# Patient Record
Sex: FEMALE | HISPANIC OR LATINO | Employment: FULL TIME | URBAN - METROPOLITAN AREA
[De-identification: names, ages, dates, MRNs, and addresses within clinical notes are randomized per-mention and may not be internally consistent; named-entity substitution may affect disease eponyms.]

---

## 2017-01-08 ENCOUNTER — ANESTHESIA (OUTPATIENT)
Dept: SURGERY | Facility: CLINIC | Age: 57
End: 2017-01-08
Payer: COMMERCIAL

## 2017-01-08 ENCOUNTER — APPOINTMENT (OUTPATIENT)
Dept: GENERAL RADIOLOGY | Facility: CLINIC | Age: 57
End: 2017-01-08
Attending: EMERGENCY MEDICINE
Payer: COMMERCIAL

## 2017-01-08 ENCOUNTER — HOSPITAL ENCOUNTER (OUTPATIENT)
Facility: CLINIC | Age: 57
Setting detail: OBSERVATION
Discharge: HOME | End: 2017-01-09
Attending: EMERGENCY MEDICINE | Admitting: SURGERY
Payer: COMMERCIAL

## 2017-01-08 ENCOUNTER — APPOINTMENT (OUTPATIENT)
Dept: GENERAL RADIOLOGY | Facility: CLINIC | Age: 57
End: 2017-01-08
Attending: SURGERY
Payer: COMMERCIAL

## 2017-01-08 ENCOUNTER — APPOINTMENT (OUTPATIENT)
Dept: ULTRASOUND IMAGING | Facility: CLINIC | Age: 57
End: 2017-01-08
Attending: EMERGENCY MEDICINE
Payer: COMMERCIAL

## 2017-01-08 ENCOUNTER — ANESTHESIA EVENT (OUTPATIENT)
Dept: SURGERY | Facility: CLINIC | Age: 57
End: 2017-01-08
Payer: COMMERCIAL

## 2017-01-08 DIAGNOSIS — K80.20: Primary | ICD-10-CM

## 2017-01-08 DIAGNOSIS — G89.18 ACUTE POST-OPERATIVE PAIN: ICD-10-CM

## 2017-01-08 DIAGNOSIS — K83.09: Primary | ICD-10-CM

## 2017-01-08 DIAGNOSIS — K81.0 ACUTE CHOLECYSTITIS: ICD-10-CM

## 2017-01-08 PROBLEM — K80.00 CHOLELITHIASIS AND ACUTE CHOLECYSTITIS WITHOUT OBSTRUCTION: Status: ACTIVE | Noted: 2017-01-08

## 2017-01-08 LAB
ALBUMIN SERPL-MCNC: 4.2 G/DL (ref 3.4–5)
ALP SERPL-CCNC: 84 U/L (ref 40–150)
ALT SERPL W P-5'-P-CCNC: 40 U/L (ref 0–50)
ANION GAP SERPL CALCULATED.3IONS-SCNC: 8 MMOL/L (ref 3–14)
AST SERPL W P-5'-P-CCNC: 19 U/L (ref 0–45)
BASOPHILS # BLD AUTO: 0.1 10E9/L (ref 0–0.2)
BASOPHILS NFR BLD AUTO: 0.5 %
BILIRUB SERPL-MCNC: 0.2 MG/DL (ref 0.2–1.3)
BUN SERPL-MCNC: 14 MG/DL (ref 7–30)
CALCIUM SERPL-MCNC: 8.5 MG/DL (ref 8.5–10.1)
CHLORIDE SERPL-SCNC: 105 MMOL/L (ref 94–109)
CO2 SERPL-SCNC: 25 MMOL/L (ref 20–32)
CREAT SERPL-MCNC: 0.59 MG/DL (ref 0.52–1.04)
D DIMER PPP FEU-MCNC: NORMAL UG/ML FEU (ref 0–0.5)
DIFFERENTIAL METHOD BLD: NORMAL
EOSINOPHIL # BLD AUTO: 0.3 10E9/L (ref 0–0.7)
EOSINOPHIL NFR BLD AUTO: 3.5 %
ERYTHROCYTE [DISTWIDTH] IN BLOOD BY AUTOMATED COUNT: 12.4 % (ref 10–15)
GFR SERPL CREATININE-BSD FRML MDRD: ABNORMAL ML/MIN/1.7M2
GLUCOSE SERPL-MCNC: 117 MG/DL (ref 70–99)
HCT VFR BLD AUTO: 41 % (ref 35–47)
HGB BLD-MCNC: 14.2 G/DL (ref 11.7–15.7)
IMM GRANULOCYTES # BLD: 0 10E9/L (ref 0–0.4)
IMM GRANULOCYTES NFR BLD: 0.3 %
LIPASE SERPL-CCNC: 179 U/L (ref 73–393)
LYMPHOCYTES # BLD AUTO: 3.8 10E9/L (ref 0.8–5.3)
LYMPHOCYTES NFR BLD AUTO: 40.2 %
MCH RBC QN AUTO: 30.7 PG (ref 26.5–33)
MCHC RBC AUTO-ENTMCNC: 34.6 G/DL (ref 31.5–36.5)
MCV RBC AUTO: 89 FL (ref 78–100)
MONOCYTES # BLD AUTO: 0.9 10E9/L (ref 0–1.3)
MONOCYTES NFR BLD AUTO: 9 %
NEUTROPHILS # BLD AUTO: 4.4 10E9/L (ref 1.6–8.3)
NEUTROPHILS NFR BLD AUTO: 46.5 %
NRBC # BLD AUTO: 0 10*3/UL
NRBC BLD AUTO-RTO: 0 /100
PLATELET # BLD AUTO: 300 10E9/L (ref 150–450)
POTASSIUM SERPL-SCNC: 3.8 MMOL/L (ref 3.4–5.3)
PROT SERPL-MCNC: 7.7 G/DL (ref 6.8–8.8)
RBC # BLD AUTO: 4.63 10E12/L (ref 3.8–5.2)
SODIUM SERPL-SCNC: 138 MMOL/L (ref 133–144)
TROPONIN I SERPL-MCNC: NORMAL UG/L (ref 0–0.04)
WBC # BLD AUTO: 9.5 10E9/L (ref 4–11)

## 2017-01-08 PROCEDURE — 40000169 ZZH STATISTIC PRE-PROCEDURE ASSESSMENT I: Performed by: SURGERY

## 2017-01-08 PROCEDURE — 71000012 ZZH RECOVERY PHASE 1 LEVEL 1 FIRST HR: Performed by: SURGERY

## 2017-01-08 PROCEDURE — 25800025 ZZH RX 258: Performed by: NURSE ANESTHETIST, CERTIFIED REGISTERED

## 2017-01-08 PROCEDURE — 25000128 H RX IP 250 OP 636: Performed by: NURSE ANESTHETIST, CERTIFIED REGISTERED

## 2017-01-08 PROCEDURE — 93005 ELECTROCARDIOGRAM TRACING: CPT

## 2017-01-08 PROCEDURE — 25000132 ZZH RX MED GY IP 250 OP 250 PS 637: Performed by: PHYSICIAN ASSISTANT

## 2017-01-08 PROCEDURE — 96365 THER/PROPH/DIAG IV INF INIT: CPT

## 2017-01-08 PROCEDURE — 25000125 ZZHC RX 250: Performed by: PHYSICIAN ASSISTANT

## 2017-01-08 PROCEDURE — 84484 ASSAY OF TROPONIN QUANT: CPT | Performed by: EMERGENCY MEDICINE

## 2017-01-08 PROCEDURE — 40000278 XR SURGERY CARM FLUORO LESS THAN 5 MIN: Mod: TC

## 2017-01-08 PROCEDURE — 36000063 ZZH SURGERY LEVEL 4 EA 15 ADDTL MIN: Performed by: SURGERY

## 2017-01-08 PROCEDURE — 25000566 ZZH SEVOFLURANE, EA 15 MIN: Performed by: SURGERY

## 2017-01-08 PROCEDURE — 37000008 ZZH ANESTHESIA TECHNICAL FEE, 1ST 30 MIN: Performed by: SURGERY

## 2017-01-08 PROCEDURE — 40000934 ZZH STATISTIC OUTPATIENT (NON-OBS) DAY

## 2017-01-08 PROCEDURE — 96366 THER/PROPH/DIAG IV INF ADDON: CPT

## 2017-01-08 PROCEDURE — 25800025 ZZH RX 258: Performed by: PHYSICIAN ASSISTANT

## 2017-01-08 PROCEDURE — 25000125 ZZHC RX 250: Performed by: NURSE ANESTHETIST, CERTIFIED REGISTERED

## 2017-01-08 PROCEDURE — 80053 COMPREHEN METABOLIC PANEL: CPT | Performed by: EMERGENCY MEDICINE

## 2017-01-08 PROCEDURE — G0378 HOSPITAL OBSERVATION PER HR: HCPCS

## 2017-01-08 PROCEDURE — 85379 FIBRIN DEGRADATION QUANT: CPT | Performed by: EMERGENCY MEDICINE

## 2017-01-08 PROCEDURE — 25500064 ZZH RX 255 OP 636: Performed by: SURGERY

## 2017-01-08 PROCEDURE — 96374 THER/PROPH/DIAG INJ IV PUSH: CPT

## 2017-01-08 PROCEDURE — 83690 ASSAY OF LIPASE: CPT | Performed by: EMERGENCY MEDICINE

## 2017-01-08 PROCEDURE — 88304 TISSUE EXAM BY PATHOLOGIST: CPT | Performed by: SURGERY

## 2017-01-08 PROCEDURE — 99285 EMERGENCY DEPT VISIT HI MDM: CPT | Mod: 25

## 2017-01-08 PROCEDURE — 25000128 H RX IP 250 OP 636: Performed by: EMERGENCY MEDICINE

## 2017-01-08 PROCEDURE — 25800025 ZZH RX 258: Performed by: SURGERY

## 2017-01-08 PROCEDURE — 85025 COMPLETE CBC W/AUTO DIFF WBC: CPT | Performed by: EMERGENCY MEDICINE

## 2017-01-08 PROCEDURE — 37000009 ZZH ANESTHESIA TECHNICAL FEE, EACH ADDTL 15 MIN: Performed by: SURGERY

## 2017-01-08 PROCEDURE — 88304 TISSUE EXAM BY PATHOLOGIST: CPT | Mod: 26 | Performed by: SURGERY

## 2017-01-08 PROCEDURE — 96375 TX/PRO/DX INJ NEW DRUG ADDON: CPT

## 2017-01-08 PROCEDURE — 76705 ECHO EXAM OF ABDOMEN: CPT

## 2017-01-08 PROCEDURE — 96361 HYDRATE IV INFUSION ADD-ON: CPT

## 2017-01-08 PROCEDURE — 71020 XR CHEST 2 VW: CPT

## 2017-01-08 PROCEDURE — 27210794 ZZH OR GENERAL SUPPLY STERILE: Performed by: SURGERY

## 2017-01-08 PROCEDURE — 47563 LAPARO CHOLECYSTECTOMY/GRAPH: CPT | Performed by: SURGERY

## 2017-01-08 PROCEDURE — 40000935 ZZH STATISTIC OUTPATIENT (NON-OBS) EVE

## 2017-01-08 PROCEDURE — 99244 OFF/OP CNSLTJ NEW/EST MOD 40: CPT | Mod: 57 | Performed by: SURGERY

## 2017-01-08 PROCEDURE — 36000093 ZZH SURGERY LEVEL 4 1ST 30 MIN: Performed by: SURGERY

## 2017-01-08 PROCEDURE — 25800025 ZZH RX 258: Performed by: ANESTHESIOLOGY

## 2017-01-08 PROCEDURE — 25000125 ZZHC RX 250: Performed by: EMERGENCY MEDICINE

## 2017-01-08 PROCEDURE — 25000125 ZZHC RX 250: Performed by: SURGERY

## 2017-01-08 PROCEDURE — 27210995 ZZH RX 272: Performed by: SURGERY

## 2017-01-08 RX ORDER — ONDANSETRON 2 MG/ML
4 INJECTION INTRAMUSCULAR; INTRAVENOUS EVERY 30 MIN PRN
Status: DISCONTINUED | OUTPATIENT
Start: 2017-01-08 | End: 2017-01-08 | Stop reason: HOSPADM

## 2017-01-08 RX ORDER — DEXAMETHASONE SODIUM PHOSPHATE 4 MG/ML
INJECTION, SOLUTION INTRA-ARTICULAR; INTRALESIONAL; INTRAMUSCULAR; INTRAVENOUS; SOFT TISSUE PRN
Status: DISCONTINUED | OUTPATIENT
Start: 2017-01-08 | End: 2017-01-08

## 2017-01-08 RX ORDER — SODIUM CHLORIDE, SODIUM LACTATE, POTASSIUM CHLORIDE, CALCIUM CHLORIDE 600; 310; 30; 20 MG/100ML; MG/100ML; MG/100ML; MG/100ML
INJECTION, SOLUTION INTRAVENOUS CONTINUOUS
Status: DISCONTINUED | OUTPATIENT
Start: 2017-01-08 | End: 2017-01-08 | Stop reason: HOSPADM

## 2017-01-08 RX ORDER — LIDOCAINE HYDROCHLORIDE 20 MG/ML
INJECTION, SOLUTION INFILTRATION; PERINEURAL PRN
Status: DISCONTINUED | OUTPATIENT
Start: 2017-01-08 | End: 2017-01-08

## 2017-01-08 RX ORDER — NALOXONE HYDROCHLORIDE 0.4 MG/ML
.1-.4 INJECTION, SOLUTION INTRAMUSCULAR; INTRAVENOUS; SUBCUTANEOUS
Status: DISCONTINUED | OUTPATIENT
Start: 2017-01-08 | End: 2017-01-09 | Stop reason: HOSPADM

## 2017-01-08 RX ORDER — HYDROCODONE BITARTRATE AND ACETAMINOPHEN 5; 325 MG/1; MG/1
1-2 TABLET ORAL EVERY 4 HOURS PRN
Qty: 30 TABLET | Refills: 0 | Status: SHIPPED | OUTPATIENT
Start: 2017-01-08 | End: 2017-01-19

## 2017-01-08 RX ORDER — HYDROCODONE BITARTRATE AND ACETAMINOPHEN 5; 325 MG/1; MG/1
1-2 TABLET ORAL EVERY 4 HOURS PRN
Status: DISCONTINUED | OUTPATIENT
Start: 2017-01-08 | End: 2017-01-09 | Stop reason: HOSPADM

## 2017-01-08 RX ORDER — GLYCOPYRROLATE 0.2 MG/ML
INJECTION, SOLUTION INTRAMUSCULAR; INTRAVENOUS PRN
Status: DISCONTINUED | OUTPATIENT
Start: 2017-01-08 | End: 2017-01-08

## 2017-01-08 RX ORDER — SODIUM CHLORIDE, SODIUM LACTATE, POTASSIUM CHLORIDE, CALCIUM CHLORIDE 600; 310; 30; 20 MG/100ML; MG/100ML; MG/100ML; MG/100ML
INJECTION, SOLUTION INTRAVENOUS CONTINUOUS
Status: DISCONTINUED | OUTPATIENT
Start: 2017-01-08 | End: 2017-01-09 | Stop reason: HOSPADM

## 2017-01-08 RX ORDER — FENTANYL CITRATE 0.05 MG/ML
25-50 INJECTION, SOLUTION INTRAMUSCULAR; INTRAVENOUS
Status: DISCONTINUED | OUTPATIENT
Start: 2017-01-08 | End: 2017-01-08 | Stop reason: HOSPADM

## 2017-01-08 RX ORDER — ONDANSETRON 2 MG/ML
4 INJECTION INTRAMUSCULAR; INTRAVENOUS EVERY 6 HOURS PRN
Status: DISCONTINUED | OUTPATIENT
Start: 2017-01-08 | End: 2017-01-09 | Stop reason: HOSPADM

## 2017-01-08 RX ORDER — MAGNESIUM HYDROXIDE 1200 MG/15ML
LIQUID ORAL PRN
Status: DISCONTINUED | OUTPATIENT
Start: 2017-01-08 | End: 2017-01-08 | Stop reason: HOSPADM

## 2017-01-08 RX ORDER — ONDANSETRON 4 MG/1
4 TABLET, ORALLY DISINTEGRATING ORAL EVERY 30 MIN PRN
Status: DISCONTINUED | OUTPATIENT
Start: 2017-01-08 | End: 2017-01-08 | Stop reason: HOSPADM

## 2017-01-08 RX ORDER — ESMOLOL HYDROCHLORIDE 10 MG/ML
INJECTION INTRAVENOUS PRN
Status: DISCONTINUED | OUTPATIENT
Start: 2017-01-08 | End: 2017-01-08

## 2017-01-08 RX ORDER — FENTANYL CITRATE 50 UG/ML
INJECTION, SOLUTION INTRAMUSCULAR; INTRAVENOUS PRN
Status: DISCONTINUED | OUTPATIENT
Start: 2017-01-08 | End: 2017-01-08

## 2017-01-08 RX ORDER — MORPHINE SULFATE 4 MG/ML
4 INJECTION, SOLUTION INTRAMUSCULAR; INTRAVENOUS ONCE
Status: COMPLETED | OUTPATIENT
Start: 2017-01-08 | End: 2017-01-08

## 2017-01-08 RX ORDER — ERTAPENEM 1 G/1
1 INJECTION, POWDER, LYOPHILIZED, FOR SOLUTION INTRAMUSCULAR; INTRAVENOUS ONCE
Status: COMPLETED | OUTPATIENT
Start: 2017-01-08 | End: 2017-01-09

## 2017-01-08 RX ORDER — PROPOFOL 10 MG/ML
INJECTION, EMULSION INTRAVENOUS CONTINUOUS PRN
Status: DISCONTINUED | OUTPATIENT
Start: 2017-01-08 | End: 2017-01-08

## 2017-01-08 RX ORDER — IOPAMIDOL 612 MG/ML
INJECTION, SOLUTION INTRAVASCULAR PRN
Status: DISCONTINUED | OUTPATIENT
Start: 2017-01-08 | End: 2017-01-08 | Stop reason: HOSPADM

## 2017-01-08 RX ORDER — NEOSTIGMINE METHYLSULFATE 1 MG/ML
VIAL (ML) INJECTION PRN
Status: DISCONTINUED | OUTPATIENT
Start: 2017-01-08 | End: 2017-01-08

## 2017-01-08 RX ORDER — ONDANSETRON 4 MG/1
4 TABLET, ORALLY DISINTEGRATING ORAL EVERY 6 HOURS PRN
Status: DISCONTINUED | OUTPATIENT
Start: 2017-01-08 | End: 2017-01-09 | Stop reason: HOSPADM

## 2017-01-08 RX ORDER — LABETALOL HYDROCHLORIDE 5 MG/ML
10 INJECTION, SOLUTION INTRAVENOUS
Status: DISCONTINUED | OUTPATIENT
Start: 2017-01-08 | End: 2017-01-08 | Stop reason: HOSPADM

## 2017-01-08 RX ORDER — PROCHLORPERAZINE MALEATE 5 MG
5-10 TABLET ORAL EVERY 6 HOURS PRN
Status: DISCONTINUED | OUTPATIENT
Start: 2017-01-08 | End: 2017-01-09 | Stop reason: HOSPADM

## 2017-01-08 RX ORDER — SODIUM CHLORIDE 9 MG/ML
1000 INJECTION, SOLUTION INTRAVENOUS CONTINUOUS
Status: DISCONTINUED | OUTPATIENT
Start: 2017-01-08 | End: 2017-01-08

## 2017-01-08 RX ORDER — HYDROMORPHONE HYDROCHLORIDE 1 MG/ML
.3-.5 INJECTION, SOLUTION INTRAMUSCULAR; INTRAVENOUS; SUBCUTANEOUS
Status: DISCONTINUED | OUTPATIENT
Start: 2017-01-08 | End: 2017-01-09 | Stop reason: HOSPADM

## 2017-01-08 RX ORDER — SODIUM CHLORIDE, SODIUM LACTATE, POTASSIUM CHLORIDE, CALCIUM CHLORIDE 600; 310; 30; 20 MG/100ML; MG/100ML; MG/100ML; MG/100ML
INJECTION, SOLUTION INTRAVENOUS CONTINUOUS PRN
Status: DISCONTINUED | OUTPATIENT
Start: 2017-01-08 | End: 2017-01-08

## 2017-01-08 RX ORDER — KETOROLAC TROMETHAMINE 30 MG/ML
INJECTION, SOLUTION INTRAMUSCULAR; INTRAVENOUS PRN
Status: DISCONTINUED | OUTPATIENT
Start: 2017-01-08 | End: 2017-01-08

## 2017-01-08 RX ORDER — PROPOFOL 10 MG/ML
INJECTION, EMULSION INTRAVENOUS PRN
Status: DISCONTINUED | OUTPATIENT
Start: 2017-01-08 | End: 2017-01-08

## 2017-01-08 RX ORDER — ONDANSETRON 2 MG/ML
INJECTION INTRAMUSCULAR; INTRAVENOUS PRN
Status: DISCONTINUED | OUTPATIENT
Start: 2017-01-08 | End: 2017-01-08

## 2017-01-08 RX ADMIN — SODIUM CHLORIDE 1000 ML: 9 INJECTION, SOLUTION INTRAVENOUS at 07:27

## 2017-01-08 RX ADMIN — SODIUM CHLORIDE, POTASSIUM CHLORIDE, SODIUM LACTATE AND CALCIUM CHLORIDE: 600; 310; 30; 20 INJECTION, SOLUTION INTRAVENOUS at 09:42

## 2017-01-08 RX ADMIN — PHENYLEPHRINE HYDROCHLORIDE 50 MCG: 10 INJECTION, SOLUTION INTRAMUSCULAR; INTRAVENOUS; SUBCUTANEOUS at 10:46

## 2017-01-08 RX ADMIN — FENTANYL CITRATE 100 MCG: 50 INJECTION, SOLUTION INTRAMUSCULAR; INTRAVENOUS at 10:03

## 2017-01-08 RX ADMIN — GLYCOPYRROLATE 0.7 MG: 0.2 INJECTION, SOLUTION INTRAMUSCULAR; INTRAVENOUS at 11:35

## 2017-01-08 RX ADMIN — SODIUM CHLORIDE, POTASSIUM CHLORIDE, SODIUM LACTATE AND CALCIUM CHLORIDE: 600; 310; 30; 20 INJECTION, SOLUTION INTRAVENOUS at 09:33

## 2017-01-08 RX ADMIN — PHENYLEPHRINE HYDROCHLORIDE 50 MCG: 10 INJECTION, SOLUTION INTRAMUSCULAR; INTRAVENOUS; SUBCUTANEOUS at 11:01

## 2017-01-08 RX ADMIN — PHENYLEPHRINE HYDROCHLORIDE 100 MCG: 10 INJECTION, SOLUTION INTRAMUSCULAR; INTRAVENOUS; SUBCUTANEOUS at 11:13

## 2017-01-08 RX ADMIN — ONDANSETRON 4 MG: 2 INJECTION INTRAMUSCULAR; INTRAVENOUS at 11:30

## 2017-01-08 RX ADMIN — MORPHINE SULFATE 4 MG: 4 INJECTION, SOLUTION INTRAMUSCULAR; INTRAVENOUS at 07:26

## 2017-01-08 RX ADMIN — FENTANYL CITRATE 25 MCG: 50 INJECTION, SOLUTION INTRAMUSCULAR; INTRAVENOUS at 11:39

## 2017-01-08 RX ADMIN — PHENYLEPHRINE HYDROCHLORIDE 100 MCG: 10 INJECTION, SOLUTION INTRAMUSCULAR; INTRAVENOUS; SUBCUTANEOUS at 11:19

## 2017-01-08 RX ADMIN — PHENYLEPHRINE HYDROCHLORIDE 50 MCG: 10 INJECTION, SOLUTION INTRAMUSCULAR; INTRAVENOUS; SUBCUTANEOUS at 10:27

## 2017-01-08 RX ADMIN — PHENYLEPHRINE HYDROCHLORIDE 50 MCG: 10 INJECTION, SOLUTION INTRAMUSCULAR; INTRAVENOUS; SUBCUTANEOUS at 10:22

## 2017-01-08 RX ADMIN — SODIUM CHLORIDE, POTASSIUM CHLORIDE, SODIUM LACTATE AND CALCIUM CHLORIDE: 600; 310; 30; 20 INJECTION, SOLUTION INTRAVENOUS at 13:18

## 2017-01-08 RX ADMIN — PHENYLEPHRINE HYDROCHLORIDE 100 MCG: 10 INJECTION, SOLUTION INTRAMUSCULAR; INTRAVENOUS; SUBCUTANEOUS at 10:53

## 2017-01-08 RX ADMIN — PHENYLEPHRINE HYDROCHLORIDE 50 MCG: 10 INJECTION, SOLUTION INTRAMUSCULAR; INTRAVENOUS; SUBCUTANEOUS at 10:24

## 2017-01-08 RX ADMIN — NEOSTIGMINE METHYLSULFATE 4 MG: 1 INJECTION INTRAMUSCULAR; INTRAVENOUS; SUBCUTANEOUS at 11:35

## 2017-01-08 RX ADMIN — PHENYLEPHRINE HYDROCHLORIDE 100 MCG: 10 INJECTION, SOLUTION INTRAMUSCULAR; INTRAVENOUS; SUBCUTANEOUS at 10:55

## 2017-01-08 RX ADMIN — PHENYLEPHRINE HYDROCHLORIDE 100 MCG: 10 INJECTION, SOLUTION INTRAMUSCULAR; INTRAVENOUS; SUBCUTANEOUS at 11:10

## 2017-01-08 RX ADMIN — PHENYLEPHRINE HYDROCHLORIDE 100 MCG: 10 INJECTION, SOLUTION INTRAMUSCULAR; INTRAVENOUS; SUBCUTANEOUS at 10:57

## 2017-01-08 RX ADMIN — PROPOFOL 50 MCG/KG/MIN: 10 INJECTION, EMULSION INTRAVENOUS at 10:07

## 2017-01-08 RX ADMIN — ROCURONIUM BROMIDE 20 MG: 10 INJECTION INTRAVENOUS at 10:15

## 2017-01-08 RX ADMIN — PROPOFOL 150 MG: 10 INJECTION, EMULSION INTRAVENOUS at 10:03

## 2017-01-08 RX ADMIN — ESMOLOL HYDROCHLORIDE 10 MG: 10 INJECTION, SOLUTION INTRAVENOUS at 10:44

## 2017-01-08 RX ADMIN — SODIUM CHLORIDE, POTASSIUM CHLORIDE, SODIUM LACTATE AND CALCIUM CHLORIDE: 600; 310; 30; 20 INJECTION, SOLUTION INTRAVENOUS at 10:52

## 2017-01-08 RX ADMIN — PHENYLEPHRINE HYDROCHLORIDE 100 MCG: 10 INJECTION, SOLUTION INTRAMUSCULAR; INTRAVENOUS; SUBCUTANEOUS at 11:24

## 2017-01-08 RX ADMIN — DEXAMETHASONE SODIUM PHOSPHATE 4 MG: 4 INJECTION, SOLUTION INTRAMUSCULAR; INTRAVENOUS at 10:22

## 2017-01-08 RX ADMIN — ESMOLOL HYDROCHLORIDE 10 MG: 10 INJECTION, SOLUTION INTRAVENOUS at 10:46

## 2017-01-08 RX ADMIN — PHENYLEPHRINE HYDROCHLORIDE 50 MCG: 10 INJECTION, SOLUTION INTRAMUSCULAR; INTRAVENOUS; SUBCUTANEOUS at 11:30

## 2017-01-08 RX ADMIN — PHENYLEPHRINE HYDROCHLORIDE 100 MCG: 10 INJECTION, SOLUTION INTRAMUSCULAR; INTRAVENOUS; SUBCUTANEOUS at 11:05

## 2017-01-08 RX ADMIN — KETOROLAC TROMETHAMINE 15 MG: 30 INJECTION, SOLUTION INTRAMUSCULAR at 11:39

## 2017-01-08 RX ADMIN — PROPOFOL 20 MG: 10 INJECTION, EMULSION INTRAVENOUS at 10:06

## 2017-01-08 RX ADMIN — SODIUM CHLORIDE 1000 ML: 9 INJECTION, SOLUTION INTRAVENOUS at 08:58

## 2017-01-08 RX ADMIN — FENTANYL CITRATE 25 MCG: 50 INJECTION, SOLUTION INTRAMUSCULAR; INTRAVENOUS at 10:20

## 2017-01-08 RX ADMIN — DEXMEDETOMIDINE 8 MCG: 100 INJECTION, SOLUTION, CONCENTRATE INTRAVENOUS at 10:27

## 2017-01-08 RX ADMIN — HYDROCODONE BITARTRATE AND ACETAMINOPHEN 1 TABLET: 5; 325 TABLET ORAL at 18:00

## 2017-01-08 RX ADMIN — LIDOCAINE HYDROCHLORIDE 40 MG: 20 INJECTION, SOLUTION INFILTRATION; PERINEURAL at 10:03

## 2017-01-08 RX ADMIN — PHENYLEPHRINE HYDROCHLORIDE 50 MCG: 10 INJECTION, SOLUTION INTRAMUSCULAR; INTRAVENOUS; SUBCUTANEOUS at 10:14

## 2017-01-08 RX ADMIN — MIDAZOLAM HYDROCHLORIDE 2 MG: 1 INJECTION, SOLUTION INTRAMUSCULAR; INTRAVENOUS at 09:54

## 2017-01-08 RX ADMIN — SUCCINYLCHOLINE CHLORIDE 70 MG: 20 INJECTION, SOLUTION INTRAMUSCULAR; INTRAVENOUS at 10:03

## 2017-01-08 RX ADMIN — ROCURONIUM BROMIDE 10 MG: 10 INJECTION INTRAVENOUS at 10:50

## 2017-01-08 RX ADMIN — FENTANYL CITRATE 25 MCG: 50 INJECTION, SOLUTION INTRAMUSCULAR; INTRAVENOUS at 11:38

## 2017-01-08 RX ADMIN — SODIUM CHLORIDE, POTASSIUM CHLORIDE, SODIUM LACTATE AND CALCIUM CHLORIDE: 600; 310; 30; 20 INJECTION, SOLUTION INTRAVENOUS at 12:49

## 2017-01-08 RX ADMIN — ERTAPENEM SODIUM 1 G: 1 INJECTION, POWDER, LYOPHILIZED, FOR SOLUTION INTRAMUSCULAR; INTRAVENOUS at 08:58

## 2017-01-08 RX ADMIN — HYDROMORPHONE HYDROCHLORIDE 0.5 MG: 1 INJECTION, SOLUTION INTRAMUSCULAR; INTRAVENOUS; SUBCUTANEOUS at 13:22

## 2017-01-08 RX ADMIN — ROCURONIUM BROMIDE 10 MG: 10 INJECTION INTRAVENOUS at 11:12

## 2017-01-08 RX ADMIN — PHENYLEPHRINE HYDROCHLORIDE 100 MCG: 10 INJECTION, SOLUTION INTRAMUSCULAR; INTRAVENOUS; SUBCUTANEOUS at 11:08

## 2017-01-08 RX ADMIN — DEXMEDETOMIDINE 4 MCG: 100 INJECTION, SOLUTION, CONCENTRATE INTRAVENOUS at 10:30

## 2017-01-08 ASSESSMENT — ENCOUNTER SYMPTOMS
DYSURIA: 0
COUGH: 0
ABDOMINAL PAIN: 1
BLOOD IN STOOL: 0
SEIZURES: 0
DIARRHEA: 0
VOMITING: 0

## 2017-01-08 ASSESSMENT — LIFESTYLE VARIABLES: TOBACCO_USE: 0

## 2017-01-08 NOTE — LETTER
[facility]  [facility]  [address] [address]  [phone]          January 9, 2017    RE:  Mariaelena Euceda                                                                                                                                                                To whom it may concern:    Mariaelena Euceda is under my professional care for her recent surgery and hospitalization. Please excuse her from all work-related activities until Monday, January 23rd. At that time she should be able to return to work without restriction. She may return to work sooner if she is able with the following restrictions in place:  - no heavy lifting >20 pounds until 1/23/17  - no strenuous physical activity until 1/23/17        Sincerely,              Lea Allen PA-C  [facility]  [phone]

## 2017-01-08 NOTE — ED AVS SNAPSHOT
MRN:[id number]                      After Visit Summary   1/8/2017    Mariaelena Euceda    MRN: [id number]           Thank you!     Thank you for choosing [facility] for your care. Our goal is always to provide you with excellent care. Hearing back from our patients is one way we can continue to improve our services. Please take a few minutes to complete the written survey that you may receive in the mail after you visit with us. Thank you!        Patient Information     Date Of Birth          1960        About your hospital stay     You were admitted on:  January 8, 2017 You last received care in the:  [facility]    You were discharged on:  January 9, 2017        Reason for your hospital stay       Laparoscopic cholecystectomy by Dr. Umnaa                  Who to Call     For medical emergencies, please call 911.  For non-urgent questions about your medical care, please call your primary care provider or clinic, [phone]  For questions related to your surgery, please call your surgery clinic        Attending Provider     Provider    Benedicto Webster, James Coyne MD       Primary Care Provider Office Phone # Fax #    Janell KIRAN Zaidi [phone]       [facility]    [address]  [place] [address]        After Care Instructions     Activity       Avoid strenuous activity or heavy lifting >20 pounds for 2-3 weeks.            Diet       Gradually resume your regular diet as tolerated.            Discharge Instructions       CONSTIPATION PREVENTION  If needed, go to a pharmacy and purchase ONE of the following stool softeners/laxatives and start taking today to prevent constipation. You can stop this bowel regimen once you are no longer taking your narcotic pain medication and are having regular bowel movements:    - Senokot oral once daily  - Milk of magnesium once daily  - Docusate Sodium 1 pill twice  daily (stool softener)  - Miralax 1 scoop/packet 1-2 times daily (laxative)    In addition to the above options, if constipation continues, you can add:   - 4 oz Prune juice or Plum juice daily for constipation            Wound care and dressings       Keep dressings clean and dry. Remove the bandaids on post-op day #2 (Tuesday). Do not remove the small white steri strips over your incision. These will typically fall off on their own in 7-10 days. Do not attempt to replace these if they should fall off sooner. On post-op day #2, after you've removed the bandaids, you can shower normally. You can allow warm water and soap to run over the incision. Do not scrub the incision. After showering pat your skin and steri strips dry. Do not submerge or soak your incisions under water for 2 weeks.                  Follow-up Appointments     Follow-up and recommended labs and tests        Please follow-up in 2 weeks in Dr. Umana's office for your first postoperative appointment. Call [phone] to schedule. Our [facility] name [facility]. The address is [address] [place] [address]  [place] [address], [address]                  Your next 10 appointments already scheduled     Jan 09, 2017 10:00 AM   [facility] Inpatient with MULTILINGUAL WORD    Services Department ([facility])    [address] [place] [address]                 Pending Results     Date and Time Order Name Status Description    1/8/2017 1133 Surgical pathology exam In process             Statement of Approval     Ordered          01/09/17 0841  I have reviewed and agree with all the recommendations and orders detailed in this document.   EFFECTIVE NOW     Approved and electronically signed by:  Lea Allen PA-C             Admission Information        Provider Department Dept Phone    1/8/2017 James Umana MD  Observation [phone]      Your Vitals  "Were     Blood Pressure Pulse Temperature Respirations Height Pulse Oximetry    118/71 mmHg 78 98.1  F (36.7  C) (Oral) 18 1.575 m (5' 2\") 100%    Last Period                   2010           [facility] Information     [facility] lets you send messages to your doctor, view your test results, renew your prescriptions, schedule appointments and more. To sign up, go to [email or web address] . Click on \"Log in\" on the left side of the screen, which will take you to the Welcome page. Then click on \"Sign up Now\" on the right side of the page.     You will be asked to enter the access code listed below, as well as some personal information. Please follow the directions to create your username and password.     Your access code is: [email or web address]  Expires: 2017  2:07 AM     Your access code will  in 90 days. If you need help or a new code, please call your [facility] clinic or [phone].        Care EveryWhere ID     This is your Care EveryWhere ID. This could be used by other organizations to access your [facility] medical records  [id number]           Review of your medicines      START taking        Dose / Directions    HYDROcodone-acetaminophen 5-325 MG per tablet   Commonly known as:  NORCO   Used for:  Cholelithiasis with acute cholangitis without biliary obstruction, Acute post-operative pain        Dose:  1-2 tablet   Take 1-2 tablets by mouth every 4 hours as needed for other (Moderate to Severe Pain)   Quantity:  30 tablet   Refills:  0         CONTINUE these medicines which have NOT CHANGED        Dose / Directions    IBUPROFEN PO        Dose:  400 mg   Take 400 mg by mouth every 6 hours as needed   Refills:  0            Where to get your medicines      Some of these will need a paper prescription and others can be bought over the counter. Ask your nurse if you have questions.     Bring a paper prescription for each of these medications    - HYDROcodone-acetaminophen 5-325 MG per tablet             " Protect others around you: Learn how to safely use, store and throw away your medicines at www.disposemymeds.org.             Medication List: This is a list of all your medications and when to take them. Check marks below indicate your daily home schedule. Keep this list as a reference.      Medications           Morning Afternoon Evening Bedtime As Needed    HYDROcodone-acetaminophen 5-325 MG per tablet   Commonly known as:  NORCO   Take 1-2 tablets by mouth every 4 hours as needed for other (Moderate to Severe Pain)   Last time this was given:  1 tablet on 1/9/2017  9:02 AM                                IBUPROFEN PO   Take 400 mg by mouth every 6 hours as needed

## 2017-01-08 NOTE — ANESTHESIA POSTPROCEDURE EVALUATION
Patient: Mariaelena Euceda    LAPAROSCOPIC CHOLECYSTECTOMY WITH CHOLANGIOGRAMS (N/A Abdomen)  Additional InformationProcedure(s):  LAPAROSCOPIC CHOLECYSTECTOMY, INTRAOPERATIVE CHOLANGIOGRAMS - Wound Class: II-Clean Contaminated    Diagnosis:unknown   Diagnosis Additional Information: No value filed.    Anesthesia Type:  General, RSI, ETT    Note:  Anesthesia Post Evaluation    Patient location during evaluation: PACU  Patient participation: Able to fully participate in evaluation  Level of consciousness: awake and alert  Pain management: satisfactory to patient  Airway patency: patent  Cardiovascular status: hemodynamically stable  Respiratory status: acceptable and unassisted  Hydration status: balanced  PONV: none     Anesthetic complications: None          Last vitals:  Filed Vitals:    01/08/17 1220 01/08/17 1230 01/08/17 1240   BP: 93/63 94/62    Pulse:      Temp:      Resp: 16 15 14   SpO2: 99% 98% 98%       Electronically Signed By: Serge Tse MD  January 8, 2017  1:00 PM

## 2017-01-08 NOTE — BRIEF OP NOTE
[facility] Brief Operative Note    Pre-operative diagnosis: Cholecystitis   Post-operative diagnosis Acute Cholecystitis with Cholelithiasis     Procedure: Procedure(s):  LAPAROSCOPIC CHOLECYSTECTOMY, INTRAOPERATIVE CHOLANGIOGRAMS - Wound Class: II-Clean Contaminated   Surgeon(s): Surgeon(s) and Role:     * James Umana MD - Primary     * Severino Garza PA-C - Assisting   Estimated blood loss: 100 mL    Specimens:   ID Type Source Tests Collected by Time Destination   A : Gallbladder and contents Organ Gallbladder and Contents SURGICAL PATHOLOGY EXAM James Umana MD 1/8/2017 11:31 AM       Findings: Vein in gallbladder fossa that bleed.  Controlled with clips.  SNoW used as well.

## 2017-01-08 NOTE — ED AVS SNAPSHOT
[facility]    [address] [address]    Phone:  [phone]                                       Mariaelena Euceda   MRN: [id number]    Department:  [facility]   Date of Visit:  1/8/2017           After Visit Summary Signature Page     I have received my discharge instructions, and my questions have been answered. I have discussed any challenges I see with this plan with the nurse or doctor.    ..........................................................................................................................................  Patient/Patient Representative Signature      ..........................................................................................................................................  Patient Representative Print Name and Relationship to Patient    ..................................................               ................................................  Date                                            Time    ..........................................................................................................................................  Reviewed by Signature/Title    ...................................................              ..............................................  Date                                                            Time

## 2017-01-08 NOTE — OP NOTE
General Surgery Operative Note    PREOPERATIVE DIAGNOSIS:  Cholecystitis.    POSTOPERATIVE DIAGNOSIS:  Cholecystitis.    PROCEDURE:  Laparoscopic Cholecystectomy and intraoperative cholangiogram    SURGEON:  James Umana M.D.    ASSISTANT:  Severino Garza PA-C    ANESTHESIA:  General.    BLOOD LOSS: 100 cc    FINDINGS: 1-Acute cholecystitis. 2-Superficial hepatic vein connected to the undersurface of the gallbladder. 3-Normal cholangiogram with prompt filling in the duodenum    INDICATIONS:   Mrs. Euceda presented with cholecystitis and is now presenting for laparoscopic cholecystectomy. I explained the risks, benefits, complications including but not limited to bleeding, infection, possible need to open, possible postop hematoma, seroma, bowel, bladder or bile duct injury, MI, PE, and if any of these occurred the patient would require additional procedures. The patient agreed and did sign consent.    DETAILS OF PROCEDURE: The patient was brought to the operating room per Anesthesia, placed in supine position, and intubated without difficulty. A Surgical timeout was then performed, verifying the correct surgeon, site, procedure, and patient and all in the room were in agreement. 1% lidocaine and 0.25% were injected into infraumbilical area. A skin incision was made and was carried down to the anterior fascia. The fascia was grasped with 2 Kocher's and sharply incised. An open Lonnie technique was used to get intra-abdominal cavity. The camera was inserted and revealed no trocar injuries. Local was injected to the upper abdomen and   5's were placed in the subxiphoid and right upper quadrant under direct visualization. The gallbladder was retracted superiorly and laterally. The gallbladder was moderately inflamed. I aspirated 100 cc of dark bile to deep compress the gallbladder for retraction. Cautery was used to take down the peritoneal attachments. I was able to delineate the artery and duct and got under the  undersurface of the gallbladder to help declinate the duct and artery further. This was taken up high to confirm the critical view on multiple views. Once I was comfortable that there was 1 duct and 1 artery going straight into the gallbladder, I clipped the duct proximal on the gallbladder. A small nick was made and a cholangiocatheter was inserted without difficulty. A cholangiogram was then performed which showed prompt filling in the hepatic ducts and duodenum. There were no filling defects. Once this was performed,the catheter was removed and 2 clips were placed proximally on the cystic duct. This was cut with scissors.The artery was done in a similar manner. The gallbladder was taken off the liver bed with cautery. The superior portion of the gallbladder was extremely intrahepatic. During dissection and cauterization a vein was injured and a significant amount of blood immediately filled the operative field. I was able to control the proximal portion to control the bleeding initially. Further dissection revealed a very superficial vein that was laying on the complete undersurface of the gallbladder. I was able to delineate the distal part of the vein and placed a clip without issues. I then with moderate difficulty was able to delineate the proximal area where the vein was injured and place additional clip. There approximate 3 other clips that were fired that did not adequately controlled the bleeding in this area. During this entire time there is approximately 100 cc of blood loss. Once I had adequate control of the vein I explored the area further and realize that this likely was a superficial aberrant branch and not a main hepatic vein. I continued to take the rest of the gallbladder off the liver bed with no other significant bleeding or bile spillage. I then placed a Surgicel pack into the hepatic fossa for further hemostasis. This was removed and inspected the area multiple times revealing that the clips  had adequately controlled the bleeding. There was no oozing from the liver. The Surgicel was then left in place.. The gallbladder was then placed in an Endo catch bag and removed through the umbilical trocar site. The camera was reinserted which showed no bleeding or bile spillage. Copious irrigation were used until clear. The liver appeared completely well perfused and not congested.The wound bed was inspected multiple times showing that it was completely dry and that the clips were in place. The omentum was packed into the perihepatic fossa. All gas was expelled and the trocars were taken out under direct visualization. The fascia closed with 0 Vicryl in figure-of-eight fashion. Marcaine 0.25% were injected to all the wounds. The skin was closed with a 4-0 Monocryl in a subcuticular fashion. Steri-strips and sterile dressings were applied. The patient tolerated the procedure well. There were no complications. They were awoken from anesthesia and transferred to PACU in stable condition. All sponge, instrument and needle counts were correct. PA was necessary for patient positioning, exposure/retraction, and assisting in closure.    LILLIAN CARMICHAEL MD     Please route or send letter to:  Primary Care Provider (PCP) and Referring Provider

## 2017-01-08 NOTE — PHARMACY-ADMISSION MEDICATION HISTORY
Admission medication history interview status for the 1/8/2017  admission is complete. See EPIC admission navigator for prior to admission medications     Medication history source reliability:Good    Actions taken by pharmacist (provider contacted, etc):Discussed PTA med list with patient.      Additional medication history information not noted on PTA med list : Patient took aspirin 81 mg this morning (1/8/17) but states she does not take regularly. She reports not taking any prescription or other OTC medications.     Medication reconciliation/reorder completed by provider prior to medication history? No    Time spent in this activity: 10 minutes     Prior to Admission medications    Medication Sig Last Dose Taking? Auth Provider   IBUPROFEN PO Take 400 mg by mouth every 6 hours as needed  1/7/2017 at  Yes Reported, Patient

## 2017-01-08 NOTE — ANESTHESIA CARE TRANSFER NOTE
Patient: Mariaelena Euceda    LAPAROSCOPIC CHOLECYSTECTOMY WITH CHOLANGIOGRAMS (N/A Abdomen)  Additional InformationProcedure(s):  LAPAROSCOPIC CHOLECYSTECTOMY, INTRAOPERATIVE CHOLANGIOGRAMS - Wound Class: II-Clean Contaminated    Diagnosis: unknown   Diagnosis Additional Information: No value filed.    Anesthesia Type:   General, RSI, ETT     Note:  Airway :Face Mask  Patient transferred to:PACU  Comments: VSS, patent airway, report to RN. Bp remains soft, patient roused. MDA present at extubation.       Vitals: (Last set prior to Anesthesia Care Transfer)              Electronically Signed By: IVANIA Galvan CRNA  January 8, 2017  12:04 PM

## 2017-01-08 NOTE — H&P
[facility]  General Surgery Consultation         James Umana    Mariaelena Euceda MRN# [id number]   YOB: 1960 Age: 56 year old      Date of Admission:  1/8/2017  Date of Consult: 1/8/2017         Assessment and Plan:   Patient is a 56 year old female with acute cholecystitis    PLAN:  I also discussed the pathophysiology of gallbladder disease and complications of cholecystitis and choledocholithiasis with the patient. After personally reviewing her imaging studies she appears to have acute cholecystitis. I would recommend laparoscopic cholecystectomy today. Her bile duct is slightly dilated at 7 mm but her labs are within normal limits. I will also plan to add a cholangiogram if necessary. I also discussed the risks associated with the procedure including, but not limited to infection, bleeding, conversion to open, bile leak, bile duct injury, retained gallstones, pneumonia, MI, and anesthesia complications with the patient.  I also discussed if a complication did occur it may require further surgical intervention during or after the procedure. The patient indicated understanding of the discussion, asked appropriate questions, and provided consent       Requesting Physician:      Dr. Webster        Chief Complaint:     Chief Complaint   Patient presents with     Chest Pain     chest pain since 2300 worse with deep breathing          History of Present Illness:   Patient is a 56 year old female who present for evaluation of cholecystitis.The patient describes having symptoms for the last 1 days. The pain is mainly located in the epigastric area and RUQ area. The patient rates the pain a 4 out of 10. The pain is exacerbated by oral intake. The pain is relieved by fasting. He also had some radiation up into her chest so there was a thought of a cardiac etiology. She had a thorough workup and all was negative. Her ultrasound showed signs consistent with acute cholecystitis.   "Patient denies fevers, chills, nausea, vomiting, jaundice, changes in stool or urine, headache, SOB, chest pain.          Physical Exam:   Blood pressure 107/64, pulse 78, temperature 98.5  F (36.9  C), temperature source Oral, resp. rate 16, height 5' 2\" (1.575 m), last menstrual period 12/02/2010, SpO2 93 %, not currently breastfeeding.  0 lbs 0 oz  General: Generally appears well.  Psych: Alert and Oriented.  Normal affect  Neurological: grossly intact  Eyes: Sclera clear  Respiratory:  Lungs clear to ausculation bilaterally with good air excursion  Cardiovascular:  Regular Rate and Rhythm with no murmurs gallops or rubs, normal peripheral pulses  GI: Abdomen Soft Moderate tenderness to palpation RUQ No hernias palpated..  Lymphatic/Hematologic/Immune:  No femoral or cervical lymphadenopathy.  Integumentary:  No rashes       Past Medical History:     Past Medical History   Diagnosis Date     Urinary tract infection, site not specified           Past Surgical History:     Past Surgical History   Procedure Laterality Date     Tubal ligation  1998     Salpingo oophorectomy,r/l/monse       Hysterectomy, pap no longer indicated       Biopsy of breast, incisional  2003     left breast, benign          Current Medications:           sodium chloride (PF)  3 mL Intravenous Q8H     sodium chloride (PF), sodium chloride (PF), naloxone, HYDROmorphone, HYDROcodone-acetaminophen, prochlorperazine **OR** prochlorperazine, ondansetron **OR** ondansetron       Home Medications:     Prior to Admission medications    Medication Sig Last Dose Taking? Auth Provider   IBUPROFEN PO Take 400 mg by mouth every 6 hours as needed  1/7/2017 at hs Yes Reported, Patient   HYDROcodone-acetaminophen (NORCO) 5-325 MG per tablet Take 1-2 tablets by mouth every 4 hours as needed for other (Moderate to Severe Pain)  Yes Severino Garza PA-C          Allergies:     Allergies   Allergen Reactions     No Known Allergies           Family History: "     Family History   Problem Relation Age of Onset     DIABETES Mother      Prostate Cancer Father      DIABETES Father      DIABETES Brother      DIABETES Sister      Breast Cancer No family hx of      Cancer - colorectal No family hx of          Social History:   Mariaelena Euceda  reports that she has never smoked. She has never used smokeless tobacco. She reports that she does not drink alcohol or use illicit drugs.        Review of Systems:   The 10 point Review of Systems is negative other than noted in the HPI.       Labs/Imaging   All new lab and imaging data was reviewed.    James Umana M.D.  [facility]

## 2017-01-08 NOTE — PROGRESS NOTES
Pt AOX4, awake, denies pain, follows commands, VSS, CMS intact. 4 port sites WDL, no drainage.   MARTHA Tse approves discharge to Obs care room 18 on room air into care of [facility].   Patient's  escorted to obs unit with patient and RN --  has patient's belongings.

## 2017-01-08 NOTE — ED PROVIDER NOTES
History     Chief Complaint:  Chest Pain and Epigastric Pain    HPI   Mariaelena Euceda is a 56 year old female, with a history of DM, who presents with chest and epigastric pain. The patient reports the onset of pain in her midline chest and epigastrium last night around 2300 that is exacerbated by deep breathing. She reports intermittent radiation to her right shoulder, but denies any other radiation such as to her jaw, back, or arms. She reports that she has had abdominal bloating after she eats for the past week. She feels very bloated now which is unusual for her. She denies a cough. She also denies vomiting, diarrhea, or blood in her stool. The patient denies urinary symptoms such as dysuria. The patient still has her gallbladder.    Cardiac/PE/DVT Risk Factors:  The patient has a history of DM. She denies a history of HTN, HLD, or smoking. The patient reports no family history of CAD or MI. The patient denies any personal or familial history of PE, DVT, or clotting disorder. The patient reports no recent travel, surgery, or other immobilizations. She denies hormone use.    Allergies:  NKDA     Medications:    Zithromax  Zantac    Past Medical History:    UTI  Diabetes   Lumbar pain    Past Surgical History:    Tubal ligation  Salpingo oophorectomy  Hysterectomy  Biopsy of left breast, benign    Family History:    Mother: Diabetes  Father: Prostate cancer, Diabetes  Brother: Diabetes  Sister: Diabetes    Social History:  Negative for alcohol use.  Negative for tobacco use.  Marital Status:   [2]     Review of Systems   Respiratory: Negative for cough.    Cardiovascular: Positive for chest pain.   Gastrointestinal: Positive for abdominal pain (epigastric). Negative for vomiting, diarrhea and blood in stool.   Genitourinary: Negative for dysuria.   All other systems reviewed and are negative.    Physical Exam   First Vitals:  BP: 158/82 mmHg  Heart Rate: 79  Temp: 98.1  F (36.7  C)  Resp: 18  Height:  "157.5 cm (5' 2\")  SpO2: 99 %      Physical Exam  Physical Exam   General:  Sitting on bed with  at bedside.   HENT:  No obvious trauma to head  Right Ear:  External ear normal.   Left Ear:  External ear normal.   Nose:  Nose normal.   Eyes:  Conjunctivae and EOM are normal. Pupils are equal, round, and reactive.   Neck: Normal range of motion. Neck supple. No tracheal deviation present.   CV:  Normal heart sounds. No murmur heard.  Pulm/Chest: Effort normal and breath sounds normal.   Abd: Right upper quadrant tenderness. Positive Bowie's. Soft. No distension. There is no rigidity, no rebound and no guarding. Neg McBurney's.  M/S: Normal range of motion.   Neuro: Alert. GCS 15.  Skin: Skin is warm and dry. No rash noted. Not diaphoretic.   Psych: Normal mood and affect. Behavior is normal.     Emergency Department Course     ECG @ 0656  Indication: CP  Rate 81 bpm.   KS interval 136 ms.   QRS duration 82 ms.   QT/QTc 384/446 ms.   P-R-T axes 54.  Notes: Normal sinus rhythm. No significant change compared to EKG dated 9/2/08.   Time read 0658     Imaging:  Radiographic findings were communicated with the patient who voiced understanding of the findings.    Chest XR per radiology:   Normal.    Limited Abdomen US per radiology:   Cholelithiasis with mild gallbladder wall thickening, prominent common bile duct, and sonographic Bowie's sign. In the right clinical setting, the findings could be consistent with acute cholecystitis.     Laboratory:  CBC: WBC 9.5 (WNL) HGB 14.2 (WNL)  (WNL)  CMP: Creatinine 0.59 (WNL) Glucose 117 (H) Rest WNL    Lipase: 179 (WNL)   D Dimer: <0.3 (WNL)  0715: Troponin I: <0.015 (WNL)       Interventions:  0727: Normal Saline, 1000 mL, IV injection   0727: Morphine, 4 mg, IV injection    0858: Invanz, 1 g, IV injection  0858: Normal Saline, 1000 mL, IV injection     ED Course:  Nursing notes and vitals reviewed.  I performed an exam of the patient as documented above.     0759: " I updated the patient on the results thus far and we discussed plan for imaging.  0840: I checked in with and updated the patient.  0844: I spoke to Dr. Umana of general surgery and he accepts the patient for admission.  0847: I updated the patient on the plan using an  phone.    Findings and plan explained to the Patient who consents to admission for surgery. Discussed the patient with Dr. Umana, who will admit the patient to a surgical bed for further monitoring, evaluation, and treatment.      Impression & Plan      Medical Decision Making:  Mariaelena Euceda is a very pleasant 56 year old female who presents with chief complaint of chest pain but it is more right upper quadrant pain. Patient has a positive Bowie's on exam. She is exquisitely tender in the right upper quadrant. Given the initial complaint of pain with deep breath, D dimer was obtained. This was negative, therefore I doubt pulmonary embolism. Screening EKG and troponin are both unremarkable. Liver enzymes, lipase, and white count are all normal, but given the degree of pain that the patient had, she underwent ultrasound which showed cholelithiasis and a thickened gallbladder wall consistent with acute cholecystitis. The patient's symptoms of pain began approximately 9 hours ago, but she has had abdominal bloating for a week after eating. I believe this is an early presentation of cholecystitis. I spoke to a general surgeon, Dr. Umana, who has agreed to admit the patient for continued evaluation and treatment and likely surgical intervention. I reviewed this with the patient and she is in agreement.    Diagnosis:    ICD-10-CM    1. Acute cholecystitis K81.0      Disposition:   Admission for further evaluation, monitoring, and treatment with likely surgical intervention.     Shoshana MCGILL, am serving as a scribe on 1/8/2017 at 7:09 AM to personally document services performed by Benedicto Webster DO, based on my  observations and the provider's statements to me.              Benedicto Webster, DO  01/08/17 0923

## 2017-01-08 NOTE — ANESTHESIA PREPROCEDURE EVALUATION
Procedure: Procedure(s):  LAPAROSCOPIC CHOLECYSTECTOMY WITH CHOLANGIOGRAMS  Preop diagnosis: unknown     Allergies   Allergen Reactions     No Known Allergies      Past Medical History   Diagnosis Date     Urinary tract infection, site not specified      Past Surgical History   Procedure Laterality Date     Tubal ligation  1998     Salpingo oophorectomy,r/l/monse       Hysterectomy, pap no longer indicated       Biopsy of breast, incisional  2003     left breast, benign     Prior to Admission medications    Medication Sig Start Date End Date Taking? Authorizing Provider   IBUPROFEN PO Take 400 mg by mouth every 6 hours as needed    Yes Reported, Patient     Current Facility-Administered Medications Ordered in Epic   Medication Dose Route Frequency Last Rate Last Dose     0.9% sodium chloride infusion  1,000 mL Intravenous Continuous 125 mL/hr at 01/08/17 0858 1,000 mL at 01/08/17 0858     ertapenem (INVanz) 1 g vial to attach to  mL bag  1 g Intravenous Once   1 g at 01/08/17 0858     Current Outpatient Prescriptions Ordered in Epic   Medication     IBUPROFEN PO     Wt Readings from Last 1 Encounters:   06/02/16 66.271 kg (146 lb 1.6 oz)     Temp Readings from Last 1 Encounters:   01/08/17 36.7  C (98.1  F) Temporal     BP Readings from Last 6 Encounters:   01/08/17 143/83   06/02/16 126/77   05/13/16 114/70   01/18/16 122/76   12/04/15 108/69   06/26/15 114/76     Pulse Readings from Last 4 Encounters:   01/08/17 78   06/02/16 68   05/13/16 88   12/04/15 71     Resp Readings from Last 1 Encounters:   01/08/17 20     SpO2 Readings from Last 1 Encounters:   01/08/17 98%     Recent Labs   Lab Test  01/08/17   0715  06/02/16   1106   NA  138  139   POTASSIUM  3.8  3.9   CHLORIDE  105  106   CO2  25  26   ANIONGAP  8  7   GLC  117*  96   BUN  14  14   CR  0.59  0.54   BETTY  8.5  9.2     Recent Labs   Lab Test  01/08/17   0715  06/02/16   1106   WBC  9.5  7.6   HGB  14.2  13.7   PLT  300  355     No results for  input(s): INR in the last 66523 hours.    Invalid input(s): APTT   RECENT LABS:   ECG:   ECHO:   CXR:        Anesthesia Evaluation     . Pt has had prior anesthetic.     No history of anesthetic complications     ROS/MED HX    ENT/Pulmonary:      (-) tobacco use and sleep apnea   Neurologic:      (-) seizures, CVA and migraines   Cardiovascular:  - neg cardiovascular ROS       METS/Exercise Tolerance:     Hematologic:         Musculoskeletal:         GI/Hepatic:     (+) GERD Symptomatic, cholecystitis/cholelithiasis,       Renal/Genitourinary:      (-) renal disease   Endo:      (-) Type II DM, thyroid disease and chronic steroid usage   Psychiatric:         Infectious Disease:        (-) Recent Fever   Malignancy:         Other:               Physical Exam  Normal systems: cardiovascular, pulmonary and dental    Airway   Mallampati: II  TM distance: >3 FB  Neck ROM: full    Dental     Cardiovascular       Pulmonary                     Anesthesia Plan      History & Physical Review  History and physical reviewed and following examination; no interval change.    ASA Status:  2 .    NPO Status:  > 8 hours    Plan for General, RSI and ETT with Propofol induction. Maintenance will be Balanced.    PONV prophylaxis:  Ondansetron (or other 5HT-3) and Dexamethasone or Solumedrol  Additional equipment: Videolaryngoscope      Postoperative Care  Postoperative pain management:  IV analgesics.      Consents  Anesthetic plan, risks, benefits and alternatives discussed with:  Patient..                          .

## 2017-01-08 NOTE — ED NOTES
"[facility]  ED Nurse Handoff Report    ED Chief complaint: Chest Pain      ED Diagnosis:   Final diagnoses:   None       Code Status: Full Code    Allergies:   Allergies   Allergen Reactions     No Known Allergies        Activity level:  Independent     Needed?: Understands English fairly well. Did use the  phone for final diagnosis information    Isolation: No  Infection: Not Applicable    Bariatric?: No      Vital Signs:   Filed Vitals:    01/08/17 0654 01/08/17 0730 01/08/17 0745   BP: 158/82 132/79 136/94   Temp: 98.1  F (36.7  C)     TempSrc: Temporal     Resp: 18     Height: 1.575 m (5' 2\")     SpO2: 99% 97% 97%       Cardiac Rhythm: ,   Cardiac  Cardiac Rhythm: Normal sinus rhythm    Pain level: 0-10 Pain Scale: 5 (After Morphine 4 mg was given)    Is this patient confused?: No    Patient Report: Initial Complaint: Patient presented with chest pain and abdominal bloating  Focused Assessment: Lungs clear/pain is constant/upper abd. And chest  Tests Performed: labs/US  Abnormal Results: cholecystitis  Treatments provided: morphine IV fluids    Family Comments:  present    OBS brochure/video discussed/provided to patient: No    ED Medications:   Medications   0.9% sodium chloride BOLUS (1,000 mLs Intravenous New Bag 1/8/17 0727)     Followed by   0.9% sodium chloride infusion (not administered)   morphine (PF) injection 4 mg (4 mg Intravenous Given 1/8/17 0726)       Drips infusing?:  No      ED NURSE PHONE NUMBER: [phone]           "

## 2017-01-08 NOTE — ED NOTES
Bed: OU18-01  Expected date: 1/8/17  Expected time:   Means of arrival:   Comments:  Surgical Admit

## 2017-01-08 NOTE — PLAN OF CARE
Problem: Goal Outcome Summary  Goal: Goal Outcome Summary  Outcome: Improving  Patient A&O x4, VSS on RA, BP a bit soft. IV infusing, PCD's on, ADAT clear liquids. Patient denies N/V, c/o abdominal pain 7/10, prn Dilaudid given with relief, patient currently sleeping. 4 abdominal lap sites CDI.  present at bedside and supportive. Will continue to monitor.

## 2017-01-09 VITALS
HEART RATE: 78 BPM | HEIGHT: 62 IN | TEMPERATURE: 98.1 F | DIASTOLIC BLOOD PRESSURE: 71 MMHG | RESPIRATION RATE: 18 BRPM | SYSTOLIC BLOOD PRESSURE: 118 MMHG | OXYGEN SATURATION: 100 %

## 2017-01-09 LAB
GLUCOSE BLDC GLUCOMTR-MCNC: 103 MG/DL (ref 70–99)
INTERPRETATION ECG - MUSE: NORMAL

## 2017-01-09 PROCEDURE — 25000132 ZZH RX MED GY IP 250 OP 250 PS 637: Performed by: PHYSICIAN ASSISTANT

## 2017-01-09 PROCEDURE — 00000146 ZZHCL STATISTIC GLUCOSE BY METER IP

## 2017-01-09 PROCEDURE — G0378 HOSPITAL OBSERVATION PER HR: HCPCS

## 2017-01-09 PROCEDURE — 96366 THER/PROPH/DIAG IV INF ADDON: CPT

## 2017-01-09 RX ADMIN — HYDROCODONE BITARTRATE AND ACETAMINOPHEN 1 TABLET: 5; 325 TABLET ORAL at 09:02

## 2017-01-09 NOTE — PROGRESS NOTES
Pt and  requested to d/c tonight. Pt has met d/c goals. BS soft but has been passing gas. Tolerating muffin and apple sauce w/o nausea.Paged Dr. Umana about d/c order. Would like pt to stay over night to observe d/t complexity of surgery. Pt and  in agreement with plan. Will continue to monitor.

## 2017-01-09 NOTE — PROGRESS NOTES
"General Surgery Progress Note    Admission Date: 1/8/2017  Today's Date: 1/9/2017         Assessment:      Mariaelena Euceda is a 56 year old female   S/P laparoscopic cholecystectomy with IOC  POD #1     Diagnosis: cholecystitis, normal cholangiogram         Plan:   Discharge to home this morning. Follow-up with Dr. Umana or PA in 2 weeks. Discharge instructions discussed, questions answered. Work requires some heavy lifting so will provide note for work for 2 weeks.    Pain: norco  Bowel: discussed home bowel regimen as needed  Antibiotics: none  Diet: regular  IVFs: saline locked  DVT prophylaxis: PCDs, ambulation          Interval History:   Afebrile overnight, VSS on room air. Occasionally heart rate charted up to 100, but typically 70s-80s. Tolerating regular diet without nausea or vomiting. Reports passing flatus. Pain controlled with Norco. Ambulating in hallway, urinating adequately.          Physical Exam:   /71 mmHg  Pulse 78  Temp(Src) 98.1  F (36.7  C) (Oral)  Resp 18  Ht 1.575 m (5' 2\")  SpO2 100%  LMP 12/02/2010  I/O last 3 completed shifts:  In: 2000 [I.V.:2000]  Out: 1050 [Urine:750; Other:200; Blood:100]  General: NAD, pleasant, alert and oriented  Cardiovascular: regular rate and rhythm; S1 and S2 distinct without murmur   Respiratory: lungs clear to anterior auscultation bilaterally without wheezes, rales or rhonchi   Abdomen: soft, appropriately tender, non distended, + bowel sounds  Incisions: clean, dry, and intact; no erythema or drainage, no ecchyomsis  Extremities: no edema, no calf tenderness    LABS:  Glucose: 103                Lea Allen PA-C  [facility]: [phone]  Pager: [phone]@[facility] (7am-5pm)        "

## 2017-01-09 NOTE — DISCHARGE SUMMARY
Patient has been discharged to home by wheelchair at January 9, 2017 11:00 AM.   present 10:00-10:20 am to review discharge instructions. New medications discussed with patient and patient verbalizes understanding. Discharge medications were filled and sent with pt. Pt educated on medications. Diet and activity and wound care discussed with patient and patient verbalizes understanding.  Upcoming appointments also discussed. Patient had no questions and was sent with work note.

## 2017-01-09 NOTE — PLAN OF CARE
A&O. VSS. Pain managed with norco. Tolerating regular diet. Bowel sounds present. Passing  Ambulating independently. 3 lap sites CDI. Plan to discharge later this morning.

## 2017-01-11 LAB — COPATH REPORT: NORMAL

## 2017-01-17 NOTE — DISCHARGE SUMMARY
[facility] Discharge Summary    Mariaelena Euceda MRN# [id number]   Age: 56 year old YOB: 1960     Date of Admission:  1/8/2017  Date of Discharge:  1/9/2017 11:05 AM  Admitting Provider:  James Umana MD  Discharge Provider:  Severino Garza PA-C with Dr. Umana  Discharging Service: General Surgery     Primary Provider: Janell Zaidi  Primary Care Physician Phone Number: [phone]          Admission Diagnoses:   Principle Diagnosis: Acute cholecystitis [K81.0]  Cholelithiasis and acute cholecystitis without obstruction [K80.00]  Secondary Diagnoses:          Discharge Diagnosis:   Cholelithiasis and acute cholecystitis without obstruction [K80.00]          Procedures:   Procedure(s): Laparoscopic Cholecystectomy and intraoperative cholangiogram            Discharge Medications:     Discharge Medication List as of 1/9/2017  9:36 AM      START taking these medications    Details   HYDROcodone-acetaminophen (NORCO) 5-325 MG per tablet Take 1-2 tablets by mouth every 4 hours as needed for other (Moderate to Severe Pain), Disp-30 tablet, R-0, Local Print         CONTINUE these medications which have NOT CHANGED    Details   IBUPROFEN PO Take 400 mg by mouth every 6 hours as needed , Historical                 Allergies:         Allergies   Allergen Reactions     No Known Allergies              Brief History of Illness:    Reason for your hospital stay       Laparoscopic cholecystectomy by Dr. Umana                 Mrs. Euceda presented with cholecystitis and is now presenting for laparoscopic cholecystectomy. I explained the risks, benefits, complications including but not limited to bleeding, infection, possible need to open, possible postop hematoma, seroma, bowel, bladder or bile duct injury, MI, PE, and if any of these occurred the patient would require additional procedures. The patient agreed and did sign consent.    After discussing the risks, benefits, and possible  "complications, informed consent was obtained and the patient underwent the above procedure.  There were no complications.  Please see the Operative Report for full details.           Hospital Course:   Mariaelena Euceda's hospital course was unremarkable.  She recovered as anticipated and experienced no post-operative complications.     On the date of discharge, the patient was discharged to home in stable condition and afebrile.  She verbalized understanding of all discharge instructions and felt comfortable with the discharge plan.  She was asked to call with any further questions or concerns.         Condition on Discharge:      Discharge condition: Stable   Discharge vitals: Blood pressure 118/71, pulse 78, temperature 98.1  F (36.7  C), temperature source Oral, resp. rate 18, height 1.575 m (5' 2\"), last menstrual period 12/02/2010, SpO2 100 %, not currently breastfeeding.           Discharge Disposition:   Discharged to home          Discharge Instructions and Follow-Up:      Mariaelena Euceda was asked to follow up with surgical team in 1-2 weeks.      Severino Garza PA-C  dictating on behalf of Dr. Umana  [phone]       "

## 2017-01-19 ENCOUNTER — OFFICE VISIT (OUTPATIENT)
Dept: SURGERY | Facility: CLINIC | Age: 57
End: 2017-01-19
Payer: COMMERCIAL

## 2017-01-19 DIAGNOSIS — Z09 SURGERY FOLLOW-UP EXAMINATION: Primary | ICD-10-CM

## 2017-01-19 PROCEDURE — 99024 POSTOP FOLLOW-UP VISIT: CPT | Performed by: SURGERY

## 2017-01-19 NOTE — LETTER
2017      RE: Mairaelena Euceda-:  64    Doing well. Tolerating diet. Only complains of mild reflux and bloating on occasion. Having regular Bowel movements. Pain controlled without narcotics.     Abdomen soft non-tender entire abdomen No hernias palpated. Incision-Healing well No erythema      A/P  Mariaelena Euceda is recovering well from a laparoscopic cholecystectomy. I advised her to slowly return to regular activity. I expect she will make a complete recovery without issues.     Thank you for the opportunity to help in her care.     James Umana M.D.  [facility]  [phone]

## 2017-01-19 NOTE — PROGRESS NOTES
Surgery Postoperative Note    Doing well. Tolerating diet. Only complains of mild reflux and bloating on occasion. Having regular Bowel movements. Pain controlled without narcotics.    Abdomen Soft Non-Tender entire abdomen No hernias palpated. Incision-Healing well No erythema     A/P  Mariaelena Euceda is recovering well from a Laparoscopic Cholecystectomy. I advised her to slowly return to regular activity. I expect she will make a complete recovery without issues.    Thank you for the opportunity to help in her care.    aJmes Umana M.D.  [facility]  [phone]    Please route or send letter to:  Primary Care Provider (PCP) and Referring Provider

## 2017-01-19 NOTE — MR AVS SNAPSHOT
"              After Visit Summary   2017    Mariaelena Euceda    MRN: [id number]           Patient Information     Date Of Birth          1960        Visit Information        Provider Department      2017 9:00 AM James Umana MD; [employer] [facility] General Surgery      Today's Diagnoses     Surgery follow-up examination    -  1        Follow-ups after your visit        Who to contact     If you have questions or need follow up information about today's clinic visit or your schedule please contact [facility] directly at [phone].  Normal or non-critical lab and imaging results will be communicated to you by [employer], letter or phone within 4 business days after the clinic has received the results. If you do not hear from us within 7 days, please contact the clinic through [employer] or phone. If you have a critical or abnormal lab result, we will notify you by phone as soon as possible.  Submit refill requests through [employer] or call your pharmacy and they will forward the refill request to us. Please allow 3 business days for your refill to be completed.          Additional Information About Your Visit        MyChart Information     [employer] lets you send messages to your doctor, view your test results, renew your prescriptions, schedule appointments and more. To sign up, go to [email or web address] . Click on \"Log in\" on the left side of the screen, which will take you to the Welcome page. Then click on \"Sign up Now\" on the right side of the page.     You will be asked to enter the access code listed below, as well as some personal information. Please follow the directions to create your username and password.     Your access code is: [email or web address]  Expires: 2017  2:07 AM     Your access code will  in 90 days. If you need help or a new code, please call your [place] clinic or [phone].      "   Care EveryWhere ID     This is your Care EveryWhere ID. This could be used by other organizations to access your [facility] medical records  [id number]        Your Vitals Were     Last Period                   12/02/2010            Blood Pressure from Last 3 Encounters:   01/09/17 118/71   06/02/16 126/77   05/13/16 114/70    Weight from Last 3 Encounters:   06/02/16 146 lb 1.6 oz (66.271 kg)   05/13/16 146 lb (66.225 kg)   01/18/16 142 lb (64.411 kg)              Today, you had the following     No orders found for display       Primary Care Provider Office Phone # Fax #    Janell Zaidi [phone]       [facility]    [address] [address]        Thank you!     Thank you for choosing [facility]  for your care. Our goal is always to provide you with excellent care. Hearing back from our patients is one way we can continue to improve our services. Please take a few minutes to complete the written survey that you may receive in the mail after your visit with us. Thank you!             Your Updated Medication List - Protect others around you: Learn how to safely use, store and throw away your medicines at www.disposemymeds.org.          This list is accurate as of: 1/19/17  9:33 AM.  Always use your most recent med list.                   Brand Name Dispense Instructions for use    IBUPROFEN PO      Take 400 mg by mouth every 6 hours as needed

## 2017-04-03 ENCOUNTER — HOSPITAL ENCOUNTER (OUTPATIENT)
Dept: MAMMOGRAPHY | Facility: CLINIC | Age: 57
Discharge: HOME | End: 2017-04-03
Attending: INTERNAL MEDICINE | Admitting: INTERNAL MEDICINE
Payer: COMMERCIAL

## 2017-04-03 DIAGNOSIS — Z12.31 ENCOUNTER FOR SCREENING MAMMOGRAM FOR HIGH-RISK PATIENT: ICD-10-CM

## 2017-04-03 PROCEDURE — G0202 SCR MAMMO BI INCL CAD: HCPCS

## 2017-06-12 ENCOUNTER — OFFICE VISIT (OUTPATIENT)
Dept: FAMILY MEDICINE | Facility: CLINIC | Age: 57
End: 2017-06-12
Payer: COMMERCIAL

## 2017-06-12 VITALS
OXYGEN SATURATION: 99 % | BODY MASS INDEX: 26.61 KG/M2 | HEIGHT: 62 IN | SYSTOLIC BLOOD PRESSURE: 95 MMHG | TEMPERATURE: 97.2 F | HEART RATE: 72 BPM | WEIGHT: 144.6 LBS | DIASTOLIC BLOOD PRESSURE: 75 MMHG

## 2017-06-12 DIAGNOSIS — K31.9 DYSPEPSIA AND DISORDER OF FUNCTION OF STOMACH: Primary | ICD-10-CM

## 2017-06-12 DIAGNOSIS — Z90.49 S/P LAPAROSCOPIC CHOLECYSTECTOMY: ICD-10-CM

## 2017-06-12 DIAGNOSIS — R10.13 DYSPEPSIA AND DISORDER OF FUNCTION OF STOMACH: Primary | ICD-10-CM

## 2017-06-12 DIAGNOSIS — R73.09 ELEVATED GLUCOSE: ICD-10-CM

## 2017-06-12 PROBLEM — K80.00 CHOLELITHIASIS AND ACUTE CHOLECYSTITIS WITHOUT OBSTRUCTION: Status: RESOLVED | Noted: 2017-01-08 | Resolved: 2017-06-12

## 2017-06-12 LAB
ALBUMIN UR-MCNC: NEGATIVE MG/DL
APPEARANCE UR: CLEAR
BACTERIA #/AREA URNS HPF: ABNORMAL /HPF
BILIRUB UR QL STRIP: NEGATIVE
COLOR UR AUTO: YELLOW
GLUCOSE UR STRIP-MCNC: NEGATIVE MG/DL
HBA1C MFR BLD: 5.4 % (ref 4.3–6)
HGB UR QL STRIP: ABNORMAL
KETONES UR STRIP-MCNC: NEGATIVE MG/DL
LEUKOCYTE ESTERASE UR QL STRIP: ABNORMAL
NITRATE UR QL: NEGATIVE
NON-SQ EPI CELLS #/AREA URNS LPF: ABNORMAL /LPF
PH UR STRIP: 5.5 PH (ref 5–7)
RBC #/AREA URNS AUTO: ABNORMAL /HPF (ref 0–2)
SP GR UR STRIP: 1.01 (ref 1–1.03)
URN SPEC COLLECT METH UR: ABNORMAL
UROBILINOGEN UR STRIP-ACNC: 0.2 EU/DL (ref 0.2–1)
WBC #/AREA URNS AUTO: ABNORMAL /HPF (ref 0–2)

## 2017-06-12 PROCEDURE — 81001 URINALYSIS AUTO W/SCOPE: CPT | Performed by: INTERNAL MEDICINE

## 2017-06-12 PROCEDURE — 99214 OFFICE O/P EST MOD 30 MIN: CPT | Performed by: INTERNAL MEDICINE

## 2017-06-12 PROCEDURE — 80053 COMPREHEN METABOLIC PANEL: CPT | Performed by: INTERNAL MEDICINE

## 2017-06-12 PROCEDURE — 36415 COLL VENOUS BLD VENIPUNCTURE: CPT | Performed by: INTERNAL MEDICINE

## 2017-06-12 PROCEDURE — 87086 URINE CULTURE/COLONY COUNT: CPT | Performed by: INTERNAL MEDICINE

## 2017-06-12 PROCEDURE — 83036 HEMOGLOBIN GLYCOSYLATED A1C: CPT | Performed by: INTERNAL MEDICINE

## 2017-06-12 RX ORDER — SIMETHICONE 80 MG
80 TABLET,CHEWABLE ORAL EVERY 6 HOURS PRN
Qty: 30 TABLET | Refills: 1 | Status: SHIPPED | OUTPATIENT
Start: 2017-06-12 | End: 2017-07-03

## 2017-06-12 ASSESSMENT — PAIN SCALES - GENERAL: PAINLEVEL: NO PAIN (0)

## 2017-06-12 NOTE — MR AVS SNAPSHOT
"              After Visit Summary   6/12/2017    Mariaelena Euceda    MRN: [id number]           Patient Information     Date Of Birth          1960        Visit Information        Provider Department      6/12/2017 9:30 AM Janell Zaidi MD; [place] [employer] [facility]        Today's Diagnoses     Dyspepsia and disorder of function of stomach    -  1    S/P laparoscopic cholecystectomy        Elevated glucose        Gas          Care Instructions    Labs today  Take zantac twice a day for stomach  Take simethicone for gas and bloating as needed,  Follow up as needed  Seek sooner medical attention if there is any worsening of symptoms or problems.            Follow-ups after your visit        Who to contact     If you have questions or need follow up information about today's clinic visit or your schedule please contact [facility] directly at [phone].  Normal or non-critical lab and imaging results will be communicated to you by [employer], letter or phone within 4 business days after the clinic has received the results. If you do not hear from us within 7 days, please contact the clinic through MyChart or phone. If you have a critical or abnormal lab result, we will notify you by phone as soon as possible.  Submit refill requests through [employer] or call your pharmacy and they will forward the refill request to us. Please allow 3 business days for your refill to be completed.          Additional Information About Your Visit        MyChart Information     [employer] lets you send messages to your doctor, view your test results, renew your prescriptions, schedule appointments and more. To sign up, go to [email or web address] . Click on \"Log in\" on the left side of the screen, which will take you to the Welcome page. Then click on \"Sign up Now\" on the right side of the page.     You will be asked to enter the access code listed below, as well as some personal information. " "Please follow the directions to create your username and password.     Your access code is: [email or web address]  Expires: 9/10/2017  9:50 AM     Your access code will  in 90 days. If you need help or a new code, please call your [facility] clinic or [phone].        Care EveryWhere ID     This is your Care EveryWhere ID. This could be used by other organizations to access your [facility] medical records  [id number]        Your Vitals Were     Pulse Temperature Height Last Period Pulse Oximetry Breastfeeding?    72 97.2  F (36.2  C) (Oral) 5' 2\" (1.575 m) 2010 99% No    BMI (Body Mass Index)                   26.45 kg/m2            Blood Pressure from Last 3 Encounters:   17 95/75   17 118/71   16 126/77    Weight from Last 3 Encounters:   17 144 lb 9.6 oz (65.6 kg)   16 146 lb 1.6 oz (66.3 kg)   16 146 lb (66.2 kg)              We Performed the Following     Comprehensive metabolic panel (BMP + Alb, Alk Phos, ALT, AST, Total. Bili, TP)     Hemoglobin A1c     UA with Microscopic reflex to Culture          Today's Medication Changes          These changes are accurate as of: 17  9:50 AM.  If you have any questions, ask your nurse or doctor.               Start taking these medicines.        Dose/Directions    ranitidine 150 MG tablet   Commonly known as:  ZANTAC   Used for:  Dyspepsia and disorder of function of stomach   Started by:  Janell Zaidi MD        Dose:  150 mg   Take 1 tablet (150 mg) by mouth 2 times daily   Quantity:  60 tablet   Refills:  1       simethicone 80 MG chewable tablet   Commonly known as:  MYLICON   Used for:  Gas   Started by:  Janell Zaidi MD        Dose:  80 mg   Take 1 tablet (80 mg) by mouth every 6 hours as needed for flatulence or cramping   Quantity:  30 tablet   Refills:  1            Where to get your medicines      These medications were sent to [facility], [place] - [address], [address]  " [address], [address], [facility] [address]     Phone:  [phone]     ranitidine 150 MG tablet    simethicone 80 MG chewable tablet                Primary Care Provider Office Phone # Fax #    Janell Zaidi [phone]       [facility]    [address]  [facility] [address]        Thank you!     Thank you for choosing [facility]  for your care. Our goal is always to provide you with excellent care. Hearing back from our patients is one way we can continue to improve our services. Please take a few minutes to complete the written survey that you may receive in the mail after your visit with us. Thank you!             Your Updated Medication List - Protect others around you: Learn how to safely use, store and throw away your medicines at www.disposemymeds.org.          This list is accurate as of: 6/12/17  9:50 AM.  Always use your most recent med list.                   Brand Name Dispense Instructions for use    ranitidine 150 MG tablet    ZANTAC    60 tablet    Take 1 tablet (150 mg) by mouth 2 times daily       simethicone 80 MG chewable tablet    MYLICON    30 tablet    Take 1 tablet (80 mg) by mouth every 6 hours as needed for flatulence or cramping

## 2017-06-12 NOTE — PATIENT INSTRUCTIONS
Labs today  Take zantac twice a day for stomach  Take simethicone for gas and bloating as needed,  Follow up as needed  Seek sooner medical attention if there is any worsening of symptoms or problems.

## 2017-06-12 NOTE — LETTER
[facility]  [address] [place] [address] [facility]  [address]  [place]  [address]  Tel: [phone]    June 14, 2017    Mariaelena Ok  [address] [facility] [address]        Dear MsYuliya Euceda,    The testing of your blood sugar, kidney function, liver function and electrolytes was normal.     If you have any further questions or problems, please contact our office.      Sincerely,    Janell Zaidi MD/lilly          Results for orders placed or performed in visit on 06/12/17   Comprehensive metabolic panel (BMP + Alb, Alk Phos, ALT, AST, Total. Bili, TP)   Result Value Ref Range    Sodium 141 133 - 144 mmol/L    Potassium 3.9 3.4 - 5.3 mmol/L    Chloride 108 94 - 109 mmol/L    Carbon Dioxide 23 20 - 32 mmol/L    Anion Gap 10 3 - 14 mmol/L    Glucose 97 70 - 99 mg/dL    Urea Nitrogen 11 7 - 30 mg/dL    Creatinine 0.58 0.52 - 1.04 mg/dL    GFR Estimate >90  Non  GFR Calc   >60 mL/min/1.7m2    GFR Estimate If Black >90   GFR Calc   >60 mL/min/1.7m2    Calcium 8.6 8.5 - 10.1 mg/dL    Bilirubin Total 0.3 0.2 - 1.3 mg/dL    Albumin 4.1 3.4 - 5.0 g/dL    Protein Total 7.1 6.8 - 8.8 g/dL    Alkaline Phosphatase 82 40 - 150 U/L    ALT 25 0 - 50 U/L    AST 14 0 - 45 U/L   Hemoglobin A1c   Result Value Ref Range    Hemoglobin A1C 5.4 4.3 - 6.0 %   UA with Microscopic reflex to Culture   Result Value Ref Range    Color Urine Yellow     Appearance Urine Clear     Glucose Urine Negative NEG mg/dL    Bilirubin Urine Negative NEG    Ketones Urine Negative NEG mg/dL    Specific Gravity Urine 1.015 1.003 - 1.035    pH Urine 5.5 5.0 - 7.0 pH    Protein Albumin Urine Negative NEG mg/dL    Urobilinogen Urine 0.2 0.2 - 1.0 EU/dL    Nitrite Urine Negative NEG    Blood Urine Small (A) NEG    Leukocyte Esterase Urine Large (A) NEG    Source Midstream Urine     WBC Urine 10-25 (A) 0 - 2 /HPF    RBC Urine 2-5 (A) 0 - 2 /HPF    Squamous Epithelial /LPF Urine Few FEW /LPF    Bacteria Urine Few (A) NEG  /HPF   Urine Culture Aerobic Bacterial   Result Value Ref Range    Specimen Description Midstream Urine     Culture Micro       <10,000 colonies/mL mixed urogenital nick  Susceptibility testing not routinely done      Micro Report Status FINAL 06/13/2017

## 2017-06-12 NOTE — PROGRESS NOTES
SUBJECTIVE:                                                    Mariaelena Euceda is a 56 year old female who presents to clinic today for the following health issues:  History obtained with help of Serbian intrpretor    Concern - Stomach problem      Onset: Jan 2017    Description: Pt states she had abdominal surgery in Jan -     - Currently, still feel bloated and constantly, burping     Intensity: 0/10    Progression of Symptoms:  improving    Accompanying Signs & Symptoms:  Noticed when after eating that stomach gets bigger and feel discomfort.        Previous history of similar problem:   A little bit before the surgery     Precipitating factors:   Worsened by: Mild discomfort after eating     Alleviating factors:  Improved by: None        Therapies Tried and outcome: Nothing   She had a laparoscopic cholecystectomy with cholangiograms on 1/8/17 and reports that it went well  Her symptoms related to her gallbladder have improved since the procedure   However, since surgery she has been experiencing new symptoms including abdominal bloating after eating accompanied with frequent burping  Symptoms occur frequently, typically develop in the afternoon and continue to worsen through the evening  Has not noticed any specific foods that she has identified causing these symptoms  Denies any pain associated with these symptoms  Her appetite and bowel movements have been normal, has no additional complaints      Problem list and histories reviewed & adjusted, as indicated.  Additional history: as documented    Patient Active Problem List   Diagnosis     Lumbar pain     CARDIOVASCULAR SCREENING; LDL GOAL LESS THAN 160     Family history of diabetes mellitus     Cholelithiasis and acute cholecystitis without obstruction     Past Surgical History:   Procedure Laterality Date     BIOPSY OF BREAST, INCISIONAL  2003    left breast, benign     HYSTERECTOMY, PAP NO LONGER INDICATED       LAPAROSCOPIC CHOLECYSTECTOMY WITH  "CHOLANGIOGRAMS N/A 1/8/2017    Procedure: LAPAROSCOPIC CHOLECYSTECTOMY WITH CHOLANGIOGRAMS;  Surgeon: James Umana MD;  Location: [facility]     SALPINGO OOPHORECTOMY,R/L/RANDELL       TUBAL LIGATION  1998       Social History   Substance Use Topics     Smoking status: Never Smoker     Smokeless tobacco: Never Used     Alcohol use No     Family History   Problem Relation Age of Onset     DIABETES Mother      Prostate Cancer Father      DIABETES Father      DIABETES Brother      DIABETES Sister      Breast Cancer No family hx of      Cancer - colorectal No family hx of          Current Outpatient Prescriptions   Medication Sig Dispense Refill     IBUPROFEN PO Take 400 mg by mouth every 6 hours as needed        Allergies   Allergen Reactions     No Known Allergies        Reviewed and updated as needed this visit by clinical staff  Tobacco  Allergies  Meds  Med Hx  Surg Hx  Fam Hx  Soc Hx      Reviewed and updated as needed this visit by Provider         ROS:  Constitutional, HEENT, cardiovascular, pulmonary, gi and gu systems are negative, except as otherwise noted.    POS for abdominal bloating, belching      This document serves as a record of the services and decisions personally performed and made by Janell Zaidi MD. It was created on her behalf by George Jimenez, a trained medical scribe. The creation of this document is based on the provider's statements to the medical scribe.    Scribkumar Jimenez 9:47 AM, June 12, 2017    OBJECTIVE:                                                    BP 95/75 (BP Location: Left arm, Patient Position: Chair, Cuff Size: Adult Large)  Pulse 72  Temp 97.2  F (36.2  C) (Oral)  Ht 1.575 m (5' 2\")  Wt 65.6 kg (144 lb 9.6 oz)  LMP 12/02/2010  SpO2 99%  Breastfeeding? No  BMI 26.45 kg/m2  Body mass index is 26.45 kg/(m^2).  GENERAL APPEARANCE: healthy, alert and no distress  ABDOMEN: soft, nontender, no guarding or rigidity, without hepatosplenomegaly or masses and " bowel sounds normal  PSYCH: mentation appears normal and affect normal/bright       ASSESSMENT/PLAN:                                                      Mariaelena was seen today for gi problem.    Diagnoses and all orders for this visit:    Dyspepsia and disorder of function of stomach  Patient reports symptoms developing post laparoscopic cholecystectomy on 1/8/17  Advised her that these can be normal symptoms after this operation  Will treat with zantac to be taken twice a day as prescribed   Will check labs to ensure there is not a concerning cause of these symptoms  -     Comprehensive metabolic panel (BMP + Alb, Alk Phos, ALT, AST, Total. Bili, TP)  -     UA with Microscopic reflex to Culture  -     ranitidine (ZANTAC) 150 MG tablet; Take 1 tablet (150 mg) by mouth 2 times daily    S/P laparoscopic cholecystectomy  Patient reports that this procedure from 1/8/17 went well  Symptoms related to gallbladder have improved    Elevated glucose  Checking glucose today to ensure patient's blood sugars are under diabetic levels  Lab Results   Component Value Date    A1C 5.8 12/17/2014    A1C 5.4 03/01/2013   -     Hemoglobin A1c    Gas  See the note above  She will take simethicone for her bloating and gas as needed  -     simethicone (MYLICON) 80 MG chewable tablet; Take 1 tablet (80 mg) by mouth every 6 hours as needed for flatulence or cramping      Follow up as needed   Patient was advised to seek sooner medical attention if there is any new or worsening symptoms    The information in this document, created by the medical scribe for me, accurately reflects the services I personally performed and the decisions made by me. I have reviewed and approved this document for accuracy prior to leaving the patient care area.  Janell Zaidi MD  9:48 AM, 06/12/17    Janell Zaidi MD  [facility]

## 2017-06-12 NOTE — NURSING NOTE
"Chief Complaint   Patient presents with     GI Problem       Initial BP 95/75 (BP Location: Left arm, Patient Position: Chair, Cuff Size: Adult Large)  Pulse 72  Temp 97.2  F (36.2  C) (Oral)  Ht 5' 2\" (1.575 m)  Wt 144 lb 9.6 oz (65.6 kg)  LMP 12/02/2010  SpO2 99%  Breastfeeding? No  BMI 26.45 kg/m2 Estimated body mass index is 26.45 kg/(m^2) as calculated from the following:    Height as of this encounter: 5' 2\" (1.575 m).    Weight as of this encounter: 144 lb 9.6 oz (65.6 kg).  Medication Reconciliation: complete   Chinyere Cardenas MA     "

## 2017-06-13 DIAGNOSIS — R31.9 HEMATURIA: ICD-10-CM

## 2017-06-13 DIAGNOSIS — N39.0 UTI (URINARY TRACT INFECTION), UNCOMPLICATED: Primary | ICD-10-CM

## 2017-06-13 LAB
ALBUMIN SERPL-MCNC: 4.1 G/DL (ref 3.4–5)
ALP SERPL-CCNC: 82 U/L (ref 40–150)
ALT SERPL W P-5'-P-CCNC: 25 U/L (ref 0–50)
ANION GAP SERPL CALCULATED.3IONS-SCNC: 10 MMOL/L (ref 3–14)
AST SERPL W P-5'-P-CCNC: 14 U/L (ref 0–45)
BACTERIA SPEC CULT: NORMAL
BILIRUB SERPL-MCNC: 0.3 MG/DL (ref 0.2–1.3)
BUN SERPL-MCNC: 11 MG/DL (ref 7–30)
CALCIUM SERPL-MCNC: 8.6 MG/DL (ref 8.5–10.1)
CHLORIDE SERPL-SCNC: 108 MMOL/L (ref 94–109)
CO2 SERPL-SCNC: 23 MMOL/L (ref 20–32)
CREAT SERPL-MCNC: 0.58 MG/DL (ref 0.52–1.04)
GFR SERPL CREATININE-BSD FRML MDRD: NORMAL ML/MIN/1.7M2
GLUCOSE SERPL-MCNC: 97 MG/DL (ref 70–99)
MICRO REPORT STATUS: NORMAL
POTASSIUM SERPL-SCNC: 3.9 MMOL/L (ref 3.4–5.3)
PROT SERPL-MCNC: 7.1 G/DL (ref 6.8–8.8)
SODIUM SERPL-SCNC: 141 MMOL/L (ref 133–144)
SPECIMEN SOURCE: NORMAL

## 2017-06-13 RX ORDER — CIPROFLOXACIN 250 MG/1
250 TABLET, FILM COATED ORAL 2 TIMES DAILY
Qty: 6 TABLET | Refills: 0 | Status: SHIPPED | OUTPATIENT
Start: 2017-06-13 | End: 2017-07-03

## 2017-06-13 NOTE — PROGRESS NOTES
Please Notify the patient( patient speaks Greek)  Urine test is positive for UTI and blood  Take Cipro for 3 days  Script is sent to pharmacy( [facility])  She will need repeat UA in one week as it has red blood cells  Follow up in 1-2 weeks after finishing antibiotics

## 2017-06-14 NOTE — PROGRESS NOTES
Please Notify the patient  The testing of your blood sugar, kidney function, liver function and electrolytes was normal.  Please send the copy of lab results also to this patient.    .

## 2017-06-23 ENCOUNTER — OFFICE VISIT (OUTPATIENT)
Dept: PODIATRY | Facility: CLINIC | Age: 57
End: 2017-06-23
Payer: COMMERCIAL

## 2017-06-23 VITALS
WEIGHT: 144 LBS | BODY MASS INDEX: 26.5 KG/M2 | SYSTOLIC BLOOD PRESSURE: 108 MMHG | DIASTOLIC BLOOD PRESSURE: 68 MMHG | HEART RATE: 78 BPM | HEIGHT: 62 IN

## 2017-06-23 DIAGNOSIS — M77.8 CAPSULITIS OF FOOT, RIGHT: ICD-10-CM

## 2017-06-23 DIAGNOSIS — M20.41 HAMMER TOE OF RIGHT FOOT: Primary | ICD-10-CM

## 2017-06-23 PROCEDURE — 99213 OFFICE O/P EST LOW 20 MIN: CPT | Performed by: PODIATRIST

## 2017-06-23 NOTE — LETTER
2017       RE: Mariaelena Euceda  [address]  [facility] [address]           Dear Colleague,    Thank you for referring your patient, Mariaelena Euceda, to the [facility]. Please see a copy of my visit note below.    ASSESSMENT/PLAN:    Encounter Diagnoses   Name Primary?     Hammer toe of right foot Yes     Capsulitis, metatarsalgia, of foot, right      I discussed the nature of both the hammer toe and the pain related to a longer 2nd metatarsal.  The length is contributing to the capsular pain.     Stiffer soled shoes were recommended to offload the forefoot during the propulsive phase of gait.  Current shoes are very flexible.    Metatarsal pads were discussed and provided.    She is to return if pain persists or worsens.     Body mass index is 26.34 kg/(m^2).    Weight management plan: Patient was referred to their PCP to discuss a diet and exercise plan.      Adeel Cueto DPM, FACFAS, MS    [place] [facility] of Podiatry/Foot & Ankle Surgery      ____________________________________________________________________    HPI:         Chief Complaint: right foot pain, bottom; she specifies the right 2nd toe and region aroudn the 2nd metatarsophalangeal joint    Onset of problem: 2 weeks  Pain/ discomfort is described as:  burning  Ratin/10   Frequency:  Daily, constant    The pain is made worse with prolonged walking, weight bearing  Previous treatment: ibuprofen    History of bilateral 4th and 5th toe hammer toe surgery  *  Past Medical History:   Diagnosis Date     Urinary tract infection, site not specified    *  *  Past Surgical History:   Procedure Laterality Date     BIOPSY OF BREAST, INCISIONAL  2003    left breast, benign     HYSTERECTOMY, PAP NO LONGER INDICATED       LAPAROSCOPIC CHOLECYSTECTOMY WITH CHOLANGIOGRAMS N/A 2017    Procedure: LAPAROSCOPIC CHOLECYSTECTOMY WITH CHOLANGIOGRAMS;  Surgeon: James Umana MD;  [facility]  "OOPHORECTOMY,R/L/RANDELL       TUBAL LIGATION  1998   *  *  Current Outpatient Prescriptions   Medication Sig Dispense Refill     ciprofloxacin (CIPRO) 250 MG tablet Take 1 tablet (250 mg) by mouth 2 times daily 6 tablet 0     ranitidine (ZANTAC) 150 MG tablet Take 1 tablet (150 mg) by mouth 2 times daily 60 tablet 1     simethicone (MYLICON) 80 MG chewable tablet Take 1 tablet (80 mg) by mouth every 6 hours as needed for flatulence or cramping 30 tablet 1       ROS:    A 10-point review of systems was performed.  It is positive for that noted in the HPI and as seen below.  All other systems found to be negative.     Numbness in feet?  no   Calf pain with walking? no  Recent foot/ankle injury? no  Weight change  over past 12 months? no  Self perception as overweight? no  Recent flu-like symptoms? no  Joint pain other than feet ? no    EXAM:    Vitals: /68  Pulse 78  Ht 1.575 m (5' 2\")  Wt 65.3 kg (144 lb)  LMP 12/02/2010  BMI 26.34 kg/m2  BMI: Body mass index is 26.34 kg/(m^2).  Height: 5' 2\"    Constitutional/ general:  Pt is in no apparent distress, appears well-nourished.  Cooperative with history and physical exam.     Vascular:  Pedal pulses are palpable bilaterally for both the DP and PT arteries.  CFT < 3 sec.  No edema.  Pedal hair growth noted.     Neuro:  Alert and oriented x 3. Coordinated gait.  Light touch sensation is intact to the L4, L5, S1 distributions. No obvious deficits.  No evidence of neurological-based weakness, spasticity, or contracture in the lower extremities.     Derm: Normal texture and turgor.  No erythema, ecchymosis, or cyanosis.  No open lesions.     Musculoskeletal:    Lower extremity muscle strength is normal.  Patient is ambulatory without an assistive device or brace .  No gross deformities.  There is a contracture of the right 2nd toe proximal interphalangeal joint.  Pain on palpation: sub right 2nd metatarsophalangeal joint.  On palpation, the 2nd metatarsal is noted to " be significantly longer than the first.    Adeel Cueto DPM, FACFAS, MS    [facility] of Podiatry/Foot & Ankle Surgery              Again, thank you for allowing me to participate in the care of your patient.        Sincerely,              Adeel Cueto DPM

## 2017-06-23 NOTE — MR AVS SNAPSHOT
After Visit Summary   6/23/2017    Mariaelena Euceda    MRN: [id number]           Patient Information     Date Of Birth          1960        Visit Information        Provider Department      6/23/2017 9:15 AM Adeel Cueto DPM; [place] [employer] [facility]        Care Instructions    DR. CUETO'S CLINIC LOCATIONS     MONDAY / FRIDAY - [facility] WEDNESDAY - [place]   [address] [place] [address] [place] [address]   [phone] / [phone] / [phone]       THURSDAY - [place] SCHEDULE SURGERY: [phone]   [address] APPOINTMENTS: [phone]   [place] [address] AFTER HOURS: [phone] / [phone] BILLING QUESTIONS: [phone]      Follow as needed    Body Mass Index (BMI)  Many things can cause foot and ankle problems. Foot structure, activity level, foot mechanics and injuries are common causes of pain.  One very important issue that often goes unmentioned, is body weight.  Extra weight can cause increased stress on muscles, ligaments, bones and tendons.  Sometimes just a few extra pounds is all it takes to put one over her/his threshold. Without reducing that stress, it can be difficult to alleviate pain. Some people are uncomfortable addressing this issue, but we feel it is important for you to think about it. As Foot &  Ankle specialists, our job is addressing the lower extremity problem and possible causes. Regarding extra body weight, we encourage patients to discuss diet and weight management plans with their primary care doctors. It is this team approach that gives you the best opportunity for pain relief and getting you back on your feet.      CAPSULITIS / METATARSALGIA  All joints in the body are surrounded by a capsule, or a covering of soft tissue and ligaments. The capsule holds bones together and secretes joint fluid to help lubricate the  joint.  If a joint capsule is exposed to excessive force, it can develop microscopic tears and become inflamed. This commonly occurs in the foot due to mild variation in anatomy. Hammertoes, bunions, irregular bone length, joint immobility, etc. can all lead to excessive force on the joint. Capsule injury can also occur due to repetitive stress from exercise, insufficient support from shoes, excessive bare foot walking and excessive weight.      Conservative treatments include ice, rest from the aggravating activity, weight loss, orthotic inserts, improving shoes and shoe modifications. Appropriate shoes will protect the inflamed tissue improving the chances of healing. Avoidance of standing or walking barefoot, including around the house, is necessary to allow healing. Casts are sometimes used for more aggressive protection.  NSAIDs such as Advil are also used to help with pain and decreasing inflammation. If pain continues over a period of weeks with continuous rest and icing, Corticosteroid injections can be a treatment option to try and help decrease inflammation.    Surgery is often necessary to correct the underlying structural problem. Surgery might include shortening an excessively long bone, repairing bunion or hammertoe, lengthening a tight Achilles  tendon, etc. These are same day surgeries that might be pursued if more conservative measures fail to provide relief.      The inflamed joint capsule has the potential to completely tear. This will allow the toe to drift off the ground, curving toward the other toes. The involved toe may under or overlap the adjacent toes as drift continues. The pain may improve after the joint tears or this new position will be permanent. Surgery can address the toe alignment. Your goal of treating capsulitis is to avoid this scenario.                      Follow-ups after your visit        Your next 10 appointments already scheduled     Jul 03, 2017 10:30 AM CDT   Office  "Visit with Janell Zaidi MD   [facility] ([facility])    [address] [facility] [address] [phone]           Bring a current list of meds and any records pertaining to this visit.  For Physicals, please bring immunization records and any forms needing to be filled out.  Please arrive 10 minutes early to complete paperwork.              Who to contact     If you have questions or need follow up information about today's clinic visit or your schedule please contact [facility] directly at [phone].  Normal or non-critical lab and imaging results will be communicated to you by [employer], letter or phone within 4 business days after the clinic has received the results. If you do not hear from us within 7 days, please contact the clinic through [employer] or phone. If you have a critical or abnormal lab result, we will notify you by phone as soon as possible.  Submit refill requests through [employer] or call your pharmacy and they will forward the refill request to us. Please allow 3 business days for your refill to be completed.          Additional Information About Your Visit        MyChart Information     openPeople lets you send messages to your doctor, view your test results, renew your prescriptions, schedule appointments and more. To sign up, go to [email or web address] . Click on \"Log in\" on the left side of the screen, which will take you to the Welcome page. Then click on \"Sign up Now\" on the right side of the page.     You will be asked to enter the access code listed below, as well as some personal information. Please follow the directions to create your username and password.     Your access code is: [email or web address]  Expires: 9/10/2017  9:50 AM     Your access code will  in 90 days. If you need help or a new code, please call your [facility] or [phone].        Care EveryWhere ID     This is your Care EveryWhere ID. This could be used " "by other organizations to access your [place] medical records  [id number]        Your Vitals Were     Pulse Height Last Period BMI (Body Mass Index)          78 5' 2\" (1.575 m) 12/02/2010 26.34 kg/m2         Blood Pressure from Last 3 Encounters:   06/23/17 108/68   06/12/17 95/75   01/09/17 118/71    Weight from Last 3 Encounters:   06/23/17 144 lb (65.3 kg)   06/12/17 144 lb 9.6 oz (65.6 kg)   06/02/16 146 lb 1.6 oz (66.3 kg)              Today, you had the following     No orders found for display       Primary Care Provider Office Phone # Fax #    Janell Zaidi [phone]       [facility]    [address]  [place]                [place] [address]        Equal Access to Services     [facility]: Hadii aad ku hadasho Evangelist, waaxda luqadaha, qaybta kaalmada adereillyyada, waxay deborahin hayaan chanel perera . So [facility] [phone].    ATENCIÓN: Si habla español, tiene a jean disposición servicios gratuitos de asistencia lingüística. Llame al [phone].    We comply with applicable federal civil rights laws and [place] laws. We do not discriminate on the basis of race, color, national origin, age, disability sex, sexual orientation or gender identity.            Thank you!     Thank you for choosing [facility]  for your care. Our goal is always to provide you with excellent care. Hearing back from our patients is one way we can continue to improve our services. Please take a few minutes to complete the written survey that you may receive in the mail after your visit with us. Thank you!             Your Updated Medication List - Protect others around you: Learn how to safely use, store and throw away your medicines at www.disposemymeds.org.          This list is accurate as of: 6/23/17  9:39 AM.  Always use your most recent med list.                   Brand Name Dispense Instructions for use Diagnosis    ciprofloxacin 250 MG tablet    CIPRO    6 " tablet    Take 1 tablet (250 mg) by mouth 2 times daily    UTI (urinary tract infection), uncomplicated       ranitidine 150 MG tablet    ZANTAC    60 tablet    Take 1 tablet (150 mg) by mouth 2 times daily    Dyspepsia and disorder of function of stomach       simethicone 80 MG chewable tablet    MYLICON    30 tablet    Take 1 tablet (80 mg) by mouth every 6 hours as needed for flatulence or cramping    Gas

## 2017-06-23 NOTE — PROGRESS NOTES
ASSESSMENT/PLAN:    Encounter Diagnoses   Name Primary?     Hammer toe of right foot Yes     Capsulitis, metatarsalgia, of foot, right      I discussed the nature of both the hammer toe and the pain related to a longer 2nd metatarsal.  The length is contributing to the capsular pain.     Stiffer soled shoes were recommended to offload the forefoot during the propulsive phase of gait.  Current shoes are very flexible.    Metatarsal pads were discussed and provided.    She is to return if pain persists or worsens.     Body mass index is 26.34 kg/(m^2).    Weight management plan: Patient was referred to their PCP to discuss a diet and exercise plan.      Adeel Cueto DPM, FACFAS, MS    [place] Department of Podiatry/Foot & Ankle Surgery      ____________________________________________________________________    HPI:         Chief Complaint: right foot pain, bottom; she specifies the right 2nd toe and region aroudn the 2nd metatarsophalangeal joint    Onset of problem: 2 weeks  Pain/ discomfort is described as:  burning  Ratin/10   Frequency:  Daily, constant    The pain is made worse with prolonged walking, weight bearing  Previous treatment: ibuprofen    History of bilateral 4th and 5th toe hammer toe surgery  *  Past Medical History:   Diagnosis Date     Urinary tract infection, site not specified    *  *  Past Surgical History:   Procedure Laterality Date     BIOPSY OF BREAST, INCISIONAL  2003    left breast, benign     HYSTERECTOMY, PAP NO LONGER INDICATED       LAPAROSCOPIC CHOLECYSTECTOMY WITH CHOLANGIOGRAMS N/A 2017    Procedure: LAPAROSCOPIC CHOLECYSTECTOMY WITH CHOLANGIOGRAMS;  Surgeon: James Umana MD;  Location:  OR     SALPINGO OOPHORECTOMY,R/L/RANDELL       TUBAL LIGATION     *  *  Current Outpatient Prescriptions   Medication Sig Dispense Refill     ciprofloxacin (CIPRO) 250 MG tablet Take 1 tablet (250 mg) by mouth 2 times daily 6 tablet 0     ranitidine (ZANTAC) 150 MG tablet Take  "1 tablet (150 mg) by mouth 2 times daily 60 tablet 1     simethicone (MYLICON) 80 MG chewable tablet Take 1 tablet (80 mg) by mouth every 6 hours as needed for flatulence or cramping 30 tablet 1       ROS:    A 10-point review of systems was performed.  It is positive for that noted in the HPI and as seen below.  All other systems found to be negative.     Numbness in feet?  no   Calf pain with walking? no  Recent foot/ankle injury? no  Weight change  over past 12 months? no  Self perception as overweight? no  Recent flu-like symptoms? no  Joint pain other than feet ? no    EXAM:    Vitals: /68  Pulse 78  Ht 1.575 m (5' 2\")  Wt 65.3 kg (144 lb)  LMP 12/02/2010  BMI 26.34 kg/m2  BMI: Body mass index is 26.34 kg/(m^2).  Height: 5' 2\"    Constitutional/ general:  Pt is in no apparent distress, appears well-nourished.  Cooperative with history and physical exam.     Vascular:  Pedal pulses are palpable bilaterally for both the DP and PT arteries.  CFT < 3 sec.  No edema.  Pedal hair growth noted.     Neuro:  Alert and oriented x 3. Coordinated gait.  Light touch sensation is intact to the L4, L5, S1 distributions. No obvious deficits.  No evidence of neurological-based weakness, spasticity, or contracture in the lower extremities.     Derm: Normal texture and turgor.  No erythema, ecchymosis, or cyanosis.  No open lesions.     Musculoskeletal:    Lower extremity muscle strength is normal.  Patient is ambulatory without an assistive device or brace .  No gross deformities.  There is a contracture of the right 2nd toe proximal interphalangeal joint.  Pain on palpation: sub right 2nd metatarsophalangeal joint.  On palpation, the 2nd metatarsal is noted to be significantly longer than the first.    Adeel Cueto DPM, FACMEGHAN, MS [place] Department of Podiatry/Foot & Ankle Surgery            "

## 2017-06-30 NOTE — PROGRESS NOTES
SUBJECTIVE:                                                    Maraielena Euceda is a 56 year old female who presents to clinic today for the following health issues:    History obtained with help of interpretor.      Follow up for Abdominal Pain      Duration: almost a month    Description (location/character/radiation): stomach inflamation       Associated flank pain: None    Intensity:  0/10    Accompanying signs and symptoms:        Fever/Chills: no        Gas/Bloating: no - has improved       Nausea/vomitting: no        Diarrhea: no        Dysuria or Hematuria: no     Patient reports her abdominal pain has improved since starting antibiotic on 6/13/17 for UTI  Gas and bloating has improved with mylicon and zantac.   BMs regular     Right ear pain  Patient reports right ear pain.   She believes something flew into her ear  She does clean her ear with Q-Tips  She denies any swimming     Problem list and histories reviewed & adjusted, as indicated.  Additional history: as documented    Patient Active Problem List   Diagnosis     Lumbar pain     CARDIOVASCULAR SCREENING; LDL GOAL LESS THAN 160     Family history of diabetes mellitus     S/P laparoscopic cholecystectomy     Hematuria     Past Surgical History:   Procedure Laterality Date     BIOPSY OF BREAST, INCISIONAL  2003    left breast, benign     HYSTERECTOMY, PAP NO LONGER INDICATED       LAPAROSCOPIC CHOLECYSTECTOMY WITH CHOLANGIOGRAMS N/A 1/8/2017    Procedure: LAPAROSCOPIC CHOLECYSTECTOMY WITH CHOLANGIOGRAMS;  Surgeon: James Umana MD;  Location: [facility] OR     SALPINGO OOPHORECTOMY,R/L/RANDELL       TUBAL LIGATION  1998       Social History   Substance Use Topics     Smoking status: Never Smoker     Smokeless tobacco: Never Used     Alcohol use No     Family History   Problem Relation Age of Onset     DIABETES Mother      Prostate Cancer Father      DIABETES Father      DIABETES Brother      DIABETES Sister      Breast Cancer No family hx of      Cancer -  "colorectal No family hx of          Current Outpatient Prescriptions   Medication Sig Dispense Refill     ranitidine (ZANTAC) 150 MG tablet Take 1 tablet (150 mg) by mouth 2 times daily 60 tablet 1     simethicone (MYLICON) 80 MG chewable tablet Take 1 tablet (80 mg) by mouth every 6 hours as needed for flatulence or cramping 30 tablet 1     Allergies   Allergen Reactions     No Known Allergies        Reviewed and updated as needed this visit by clinical staff       Reviewed and updated as needed this visit by Provider         ROS:  Constitutional, neuro, ENT, endocrine, pulmonary, cardiac, gastrointestinal, genitourinary, musculoskeletal, integument and psychiatric systems are negative, except as otherwise noted.    This document serves as a record of the services and decisions personally performed and made by Janell Zaidi MD. It was created on her behalf by Marlen Weller, a trained medical scribe. The creation of this document is based on the provider's statements to the medical scribe.  Marlen Weller 10:44 AM July 3, 2017    OBJECTIVE:                                                    /72 (BP Location: Left arm, Patient Position: Chair, Cuff Size: Adult Regular)  Pulse 68  Temp 97.6  F (36.4  C) (Tympanic)  Ht 1.575 m (5' 2\")  Wt 65.8 kg (145 lb)  LMP 12/02/2010  SpO2 95%  BMI 26.52 kg/m2  Body mass index is 26.52 kg/(m^2).  GENERAL APPEARANCE: healthy, alert and no distress  HENT: ear canals and TM's normal, old clotted blood in right ear, and nose and mouth without ulcers or lesions  NECK: no adenopathy, no asymmetry, masses, or scars and thyroid normal to palpation  RESP: lungs clear to auscultation - no rales, rhonchi or wheezes  CV: regular rates and rhythm, normal S1 S2, no S3 or S4 and no murmur, click or rub    Diagnostic Test Results:  No results found for this or any previous visit (from the past 24 hour(s)).     ASSESSMENT/PLAN:                                                  "   Mariaelena was seen today for follow up for.    Diagnoses and all orders for this visit:    UTI (urinary tract infection), uncomplicated  She was treated with antibiotic with UTI. Symptoms have improved.  -     UA reflex to Microscopic as other UA had RBC which could be due to infection but will make sure that it has resolved.    Hematuria  Repeating UA today as there were red blood cells. If RBC are present again, will do work up.   -     UA reflex to Microscopic    S/P laparoscopic cholecystectomy;  Some of the GI symptoms due to that but has improved.    Ear pain, right  Injured ear with cleaning with Q-Tips, advised patient to discontinue cleaning ear canal with Q Tip.   Never use Q-tip for this purpose  Will refer to ENT if no improvement with course of antibiotics.   -     azithromycin (ZITHROMAX) 250 MG tablet; Two tablets first day, then one tablet daily for four days.    Dyspepsia and disorder of function of stomach  Patient notes improvement since starting ranitidine and mylicon.   Refills provided.  -     ranitidine (ZANTAC) 150 MG tablet; Take 1 tablet (150 mg) by mouth 2 times daily  -     simethicone (MYLICON) 80 MG chewable tablet; Take 1 tablet (80 mg) by mouth every 6 hours as needed for flatulence or cramping    Follow up with Provider - as needed    The information in this document, created by the medical scribe for me, accurately reflects the services I personally performed and the decisions made by me. I have reviewed and approved this document for accuracy prior to leaving the patient care area.  July 3, 2017 10:51 AM    Janell Zaidi MD  [facility]

## 2017-07-03 ENCOUNTER — OFFICE VISIT (OUTPATIENT)
Dept: FAMILY MEDICINE | Facility: CLINIC | Age: 57
End: 2017-07-03
Payer: COMMERCIAL

## 2017-07-03 VITALS
TEMPERATURE: 97.6 F | BODY MASS INDEX: 26.68 KG/M2 | HEIGHT: 62 IN | DIASTOLIC BLOOD PRESSURE: 72 MMHG | WEIGHT: 145 LBS | OXYGEN SATURATION: 95 % | HEART RATE: 68 BPM | SYSTOLIC BLOOD PRESSURE: 112 MMHG

## 2017-07-03 DIAGNOSIS — R10.13 DYSPEPSIA AND DISORDER OF FUNCTION OF STOMACH: ICD-10-CM

## 2017-07-03 DIAGNOSIS — R31.9 HEMATURIA: ICD-10-CM

## 2017-07-03 DIAGNOSIS — H92.01 EAR PAIN, RIGHT: ICD-10-CM

## 2017-07-03 DIAGNOSIS — N39.0 UTI (URINARY TRACT INFECTION), UNCOMPLICATED: Primary | ICD-10-CM

## 2017-07-03 DIAGNOSIS — K31.9 DYSPEPSIA AND DISORDER OF FUNCTION OF STOMACH: ICD-10-CM

## 2017-07-03 DIAGNOSIS — Z90.49 S/P LAPAROSCOPIC CHOLECYSTECTOMY: ICD-10-CM

## 2017-07-03 LAB
ALBUMIN UR-MCNC: NEGATIVE MG/DL
APPEARANCE UR: CLEAR
BACTERIA #/AREA URNS HPF: ABNORMAL /HPF
BILIRUB UR QL STRIP: NEGATIVE
COLOR UR AUTO: YELLOW
GLUCOSE UR STRIP-MCNC: NEGATIVE MG/DL
HGB UR QL STRIP: ABNORMAL
KETONES UR STRIP-MCNC: NEGATIVE MG/DL
LEUKOCYTE ESTERASE UR QL STRIP: ABNORMAL
MUCOUS THREADS #/AREA URNS LPF: PRESENT /LPF
NITRATE UR QL: NEGATIVE
NON-SQ EPI CELLS #/AREA URNS LPF: ABNORMAL /LPF
PH UR STRIP: 5.5 PH (ref 5–7)
RBC #/AREA URNS AUTO: ABNORMAL /HPF (ref 0–2)
SP GR UR STRIP: 1.02 (ref 1–1.03)
URN SPEC COLLECT METH UR: ABNORMAL
UROBILINOGEN UR STRIP-ACNC: 0.2 EU/DL (ref 0.2–1)
WBC #/AREA URNS AUTO: ABNORMAL /HPF (ref 0–2)

## 2017-07-03 PROCEDURE — 81001 URINALYSIS AUTO W/SCOPE: CPT | Performed by: INTERNAL MEDICINE

## 2017-07-03 PROCEDURE — 99214 OFFICE O/P EST MOD 30 MIN: CPT | Performed by: INTERNAL MEDICINE

## 2017-07-03 PROCEDURE — 87086 URINE CULTURE/COLONY COUNT: CPT | Performed by: INTERNAL MEDICINE

## 2017-07-03 RX ORDER — SIMETHICONE 80 MG
80 TABLET,CHEWABLE ORAL EVERY 6 HOURS PRN
Qty: 30 TABLET | Refills: 1 | Status: SHIPPED | OUTPATIENT
Start: 2017-07-03 | End: 2018-02-16

## 2017-07-03 RX ORDER — AZITHROMYCIN 250 MG/1
TABLET, FILM COATED ORAL
Qty: 6 TABLET | Refills: 0 | Status: SHIPPED | OUTPATIENT
Start: 2017-07-03 | End: 2018-02-16

## 2017-07-03 NOTE — PATIENT INSTRUCTIONS
Take antibiotics as prescribed  Follow up in 6 months  Seek sooner medical attention if there is any worsening of symptoms or problems.

## 2017-07-03 NOTE — NURSING NOTE
"Chief Complaint   Patient presents with     Follow Up For     2 weeks on stomach        Initial /72 (BP Location: Left arm, Patient Position: Chair, Cuff Size: Adult Regular)  Pulse 68  Temp 97.6  F (36.4  C) (Tympanic)  Ht 5' 2\" (1.575 m)  Wt 145 lb (65.8 kg)  LMP 12/02/2010  SpO2 95%  BMI 26.52 kg/m2 Estimated body mass index is 26.52 kg/(m^2) as calculated from the following:    Height as of this encounter: 5' 2\" (1.575 m).    Weight as of this encounter: 145 lb (65.8 kg)..    BP completed using cuff size: regular  MEDICATIONS REVIEWED  SOCIAL AND FAMILY HX REVIEWED  Beverly Farias CMA  "

## 2017-07-03 NOTE — MR AVS SNAPSHOT
"              After Visit Summary   7/3/2017    Mariaelena Euceda    MRN: [id number]           Patient Information     Date Of Birth          1960        Visit Information        Provider Department      7/3/2017 10:30 AM Janell Zaidi MD; [facility]        Today's Diagnoses     UTI (urinary tract infection), uncomplicated    -  1    Hematuria        S/P laparoscopic cholecystectomy        Ear pain, right        Dyspepsia and disorder of function of stomach          Care Instructions    Take antibiotics as prescribed  Follow up in 6 months  Seek sooner medical attention if there is any worsening of symptoms or problems.            Follow-ups after your visit        Who to contact     If you have questions or need follow up information about today's clinic visit or your schedule please contact [facility] directly at [phone].  Normal or non-critical lab and imaging results will be communicated to you by MyChart, letter or phone within 4 business days after the clinic has received the results. If you do not hear from us within 7 days, please contact the clinic through MyChart or phone. If you have a critical or abnormal lab result, we will notify you by phone as soon as possible.  Submit refill requests through [employer] or call your pharmacy and they will forward the refill request to us. Please allow 3 business days for your refill to be completed.          Additional Information About Your Visit        MyChart Information     [employer] lets you send messages to your doctor, view your test results, renew your prescriptions, schedule appointments and more. To sign up, go to [email or web address] . Click on \"Log in\" on the left side of the screen, which will take you to the Welcome page. Then click on \"Sign up Now\" on the right side of the page.     You will be asked to enter the access code listed below, as well as some personal information. Please " "follow the directions to create your username and password.     Your access code is: [email or web address]  Expires: 9/10/2017  9:50 AM     Your access code will  in 90 days. If you need help or a new code, please call your [facility] clinic or [phone].        Care EveryWhere ID     This is your Care EveryWhere ID. This could be used by other organizations to access your [facility] medical records  [id number]        Your Vitals Were     Pulse Temperature Height Last Period Pulse Oximetry BMI (Body Mass Index)    68 97.6  F (36.4  C) (Tympanic) 5' 2\" (1.575 m) 2010 95% 26.52 kg/m2       Blood Pressure from Last 3 Encounters:   17 112/72   17 108/68   17 95/75    Weight from Last 3 Encounters:   17 145 lb (65.8 kg)   17 144 lb (65.3 kg)   17 144 lb 9.6 oz (65.6 kg)              We Performed the Following     UA reflex to Microscopic          Today's Medication Changes          These changes are accurate as of: 7/3/17 10:46 AM.  If you have any questions, ask your nurse or doctor.               Start taking these medicines.        Dose/Directions    azithromycin 250 MG tablet   Commonly known as:  ZITHROMAX   Used for:  Ear pain, right   Started by:  Janell Zaidi MD        Two tablets first day, then one tablet daily for four days.   Quantity:  6 tablet   Refills:  0            Where to get your medicines      These medications were sent to [facility], [place] - [address]  [address], [address], [facility] [address]     Phone:  [phone]     azithromycin 250 MG tablet    ranitidine 150 MG tablet    simethicone 80 MG chewable tablet                Primary Care Provider Office Phone # Fax #    Janell Zaidi [phone]       [facility]    [address]  [facility] [address]        Equal Access to Services     ANDREI BARRAGAN AH: sai Rain, " jamaal wandacarlos albertobrie trippmigdalia forbes deborahin hayaan adeeg kharash la'aan ah. So [facility] [phone].    ATENCIÓN: Si ramya gonzalez, tiene a jean disposición servicios gratuitos de asistencia lingüística. Chang al [phone].    We comply with applicable federal civil rights laws and [place] laws. We do not discriminate on the basis of race, color, national origin, age, disability sex, sexual orientation or gender identity.            Thank you!     Thank you for choosing [facility]  for your care. Our goal is always to provide you with excellent care. Hearing back from our patients is one way we can continue to improve our services. Please take a few minutes to complete the written survey that you may receive in the mail after your visit with us. Thank you!             Your Updated Medication List - Protect others around you: Learn how to safely use, store and throw away your medicines at www.disposemymeds.org.          This list is accurate as of: 7/3/17 10:46 AM.  Always use your most recent med list.                   Brand Name Dispense Instructions for use Diagnosis    azithromycin 250 MG tablet    ZITHROMAX    6 tablet    Two tablets first day, then one tablet daily for four days.    Ear pain, right       ranitidine 150 MG tablet    ZANTAC    60 tablet    Take 1 tablet (150 mg) by mouth 2 times daily    Dyspepsia and disorder of function of stomach       simethicone 80 MG chewable tablet    MYLICON    30 tablet    Take 1 tablet (80 mg) by mouth every 6 hours as needed for flatulence or cramping    Dyspepsia and disorder of function of stomach

## 2017-07-04 ENCOUNTER — TELEPHONE (OUTPATIENT)
Dept: FAMILY MEDICINE | Facility: CLINIC | Age: 57
End: 2017-07-04

## 2017-07-04 DIAGNOSIS — R31.29 HEMATURIA, MICROSCOPIC: Primary | ICD-10-CM

## 2017-07-04 LAB
BACTERIA SPEC CULT: NORMAL
MICRO REPORT STATUS: NORMAL
SPECIMEN SOURCE: NORMAL

## 2017-07-05 ENCOUNTER — TELEPHONE (OUTPATIENT)
Dept: FAMILY MEDICINE | Facility: CLINIC | Age: 57
End: 2017-07-05

## 2017-07-05 NOTE — TELEPHONE ENCOUNTER
Notes Recorded by Janell Zaidi MD on 7/4/2017 at 11:32 PM  Please Notify the patient with help of Yakut interpretor  Urine culture did not grow any significant organism.  Urine is still positive for red blood cells.  You will need CT scan using hematuria protocol.  If CT is negative then she will be referred to urology for further work up  Order is placed   Please call [facility] to schedule your test  [phone]

## 2017-07-05 NOTE — TELEPHONE ENCOUNTER
Called pt with use of  number [phone]  Advised per below, pt repeated back and will call to schedule test.  Tonie Mcmillan RN

## 2017-07-05 NOTE — PROGRESS NOTES
Please Notify the patient with help of Setswana interpretor  Urine culture did not grow any significant organism.  Urine is still positive for red blood cells.  You will need CT scan using hematuria protocol.  If CT is negative then she will be referred to urology for further work up  Order is placed   Please call [facility] to schedule your test  [phone]

## 2017-07-07 ENCOUNTER — HOSPITAL ENCOUNTER (OUTPATIENT)
Dept: CT IMAGING | Facility: CLINIC | Age: 57
Discharge: HOME | End: 2017-07-07
Attending: INTERNAL MEDICINE | Admitting: INTERNAL MEDICINE
Payer: COMMERCIAL

## 2017-07-07 DIAGNOSIS — R31.29 HEMATURIA, MICROSCOPIC: ICD-10-CM

## 2017-07-07 PROCEDURE — 74176 CT ABD & PELVIS W/O CONTRAST: CPT

## 2018-02-15 ENCOUNTER — TELEPHONE (OUTPATIENT)
Dept: FAMILY MEDICINE | Facility: CLINIC | Age: 58
End: 2018-02-15

## 2018-02-15 NOTE — TELEPHONE ENCOUNTER
Reason for call:  Patient reporting a symptom    Symptom or request: Cough, back pain, sore throat,     Duration (how long have symptoms been present): 4 days     Have you been treated for this before? No    Additional comments:     Phone Number patient can be reached at:  Pt's daughter ph Spoke with Daughter Teresa Smith. [phone]    Best Time:  any    Can we leave a detailed message on this number:  YES    Call taken on 2/15/2018 at 1:05 PM by Malu Lazo

## 2018-02-15 NOTE — TELEPHONE ENCOUNTER
"I called and spoke with daughter of patient.    Patient is Vietnamese speaking so daughter was asking mother triage questions and responding back.   Daughter states that patient \"feels like she has bronchitis\"    Productive cough with phlegm and sore throat  Onset: 3 days ago  Does report of getting out of breath easier when walking stairs  No fever  No body aches/chills  No headache    Advised same-day appointment today but patient declined due to working today and would like to come in tomorrow.   Scheduled patient with Nicolasa Dunne tomorrow 8:30am.    Eryn Medellin RN    "

## 2018-02-16 ENCOUNTER — OFFICE VISIT (OUTPATIENT)
Dept: FAMILY MEDICINE | Facility: CLINIC | Age: 58
End: 2018-02-16
Payer: COMMERCIAL

## 2018-02-16 VITALS
WEIGHT: 144 LBS | BODY MASS INDEX: 26.5 KG/M2 | HEART RATE: 74 BPM | RESPIRATION RATE: 16 BRPM | SYSTOLIC BLOOD PRESSURE: 115 MMHG | OXYGEN SATURATION: 98 % | TEMPERATURE: 97.5 F | HEIGHT: 62 IN | DIASTOLIC BLOOD PRESSURE: 74 MMHG

## 2018-02-16 DIAGNOSIS — R05.9 COUGH: Primary | ICD-10-CM

## 2018-02-16 DIAGNOSIS — J98.01 ACUTE BRONCHOSPASM: ICD-10-CM

## 2018-02-16 PROCEDURE — 99213 OFFICE O/P EST LOW 20 MIN: CPT | Performed by: NURSE PRACTITIONER

## 2018-02-16 RX ORDER — ALBUTEROL SULFATE 90 UG/1
2 AEROSOL, METERED RESPIRATORY (INHALATION) EVERY 6 HOURS PRN
Qty: 1 INHALER | Refills: 0 | Status: SHIPPED | OUTPATIENT
Start: 2018-02-16 | End: 2020-02-27

## 2018-02-16 NOTE — PATIENT INSTRUCTIONS
"Drink a lot of fluids  Use the inhaler 2 puffs every 6 hours until the cough is better  Using an Inhaler  Your healthcare provider may prescribe medicine that you breathe in using a metered-dose inhaler (MDI). An inhaler sends a measured amount of medicine in a fine mist.  Step 1:    Shake the inhaler and remove the cap.    Take a deep breath and let it out.  Step 2:    Close your lips around the end of the inhaler mouthpiece. Or if you were told to use the \"open-mouth\" method, hold the inhaler 1 to 2 inches from your mouth.  Step 3:    Breathe in slowly and deeply as you press down on the inhaler to release the medicine.    Inhale fully.  Step 4:    Hold your breath for a count of 10, or as long as you can comfortably.    Then breathe out slowly through your mouth.    Repeat these steps for each puff of medicine prescribed.             Important    If the inhaler is being used for the first time or has not been used for a while, prime it as directed by the product maker. You can find important information about the medicine in the package insert. This is the paper that comes with the medicine.    If you use more than one inhaler, make sure you know which one to use first.    Your healthcare provider or pharmacist can show you how to use your inhaler the right way. Even if you think you are using it the right way, it is still a good idea to check.   Date Last Reviewed: 10/1/2016    8728-6650 [employer]. [address] [place] [address], [place] [address]. All rights reserved. This information is not intended as a substitute for professional medical care. Always follow your healthcare professional's instructions.        "

## 2018-02-16 NOTE — MR AVS SNAPSHOT
"              After Visit Summary   2/16/2018    Mariaelena Euceda    MRN: [id number]           Patient Information     Date Of Birth          1960        Visit Information        Provider Department      2/16/2018 8:15 AM Nicolasa Dunne APRN CNP; [facility]        Today's Diagnoses     Cough    -  1    Acute bronchospasm          Care Instructions    Drink a lot of fluids  Use the inhaler 2 puffs every 6 hours until the cough is better  Using an Inhaler  Your healthcare provider may prescribe medicine that you breathe in using a metered-dose inhaler (MDI). An inhaler sends a measured amount of medicine in a fine mist.  Step 1:    Shake the inhaler and remove the cap.    Take a deep breath and let it out.  Step 2:    Close your lips around the end of the inhaler mouthpiece. Or if you were told to use the \"open-mouth\" method, hold the inhaler 1 to 2 inches from your mouth.  Step 3:    Breathe in slowly and deeply as you press down on the inhaler to release the medicine.    Inhale fully.  Step 4:    Hold your breath for a count of 10, or as long as you can comfortably.    Then breathe out slowly through your mouth.    Repeat these steps for each puff of medicine prescribed.             Important    If the inhaler is being used for the first time or has not been used for a while, prime it as directed by the product maker. You can find important information about the medicine in the package insert. This is the paper that comes with the medicine.    If you use more than one inhaler, make sure you know which one to use first.    Your healthcare provider or pharmacist can show you how to use your inhaler the right way. Even if you think you are using it the right way, it is still a good idea to check.   Date Last Reviewed: 10/1/2016    7548-1749 [employer]. [address] [place] [address], [place] [address]. All rights reserved. This information is not intended as a " "substitute for professional medical care. Always follow your healthcare professional's instructions.                Follow-ups after your visit        Who to contact     If you have questions or need follow up information about today's clinic visit or your schedule please contact [facility] directly at [phone].  Normal or non-critical lab and imaging results will be communicated to you by MyChart, letter or phone within 4 business days after the clinic has received the results. If you do not hear from us within 7 days, please contact the clinic through MyChart or phone. If you have a critical or abnormal lab result, we will notify you by phone as soon as possible.  Submit refill requests through [employer] or call your pharmacy and they will forward the refill request to us. Please allow 3 business days for your refill to be completed.          Additional Information About Your Visit        MyChart Information     [employer] lets you send messages to your doctor, view your test results, renew your prescriptions, schedule appointments and more. To sign up, go to [email or web address] . Click on \"Log in\" on the left side of the screen, which will take you to the Welcome page. Then click on \"Sign up Now\" on the right side of the page.     You will be asked to enter the access code listed below, as well as some personal information. Please follow the directions to create your username and password.     Your access code is: [email or web address]  Expires: 2018  8:56 AM     Your access code will  in 90 days. If you need help or a new code, please call your [facility] or [phone].        Care EveryWhere ID     This is your Care EveryWhere ID. This could be used by other organizations to access your [facility] medical records  [id number]        Your Vitals Were     Pulse Temperature Respirations Height Last Period Pulse Oximetry    74 97.5  F (36.4  C) (Oral) 16 5' 2\" (1.575 m) 2010 98%    BMI " (Body Mass Index)                   26.34 kg/m2            Blood Pressure from Last 3 Encounters:   02/16/18 115/74   07/03/17 112/72   06/23/17 108/68    Weight from Last 3 Encounters:   02/16/18 144 lb (65.3 kg)   07/03/17 145 lb (65.8 kg)   06/23/17 144 lb (65.3 kg)              Today, you had the following     No orders found for display         Today's Medication Changes          These changes are accurate as of 2/16/18  8:58 AM.  If you have any questions, ask your nurse or doctor.               Start taking these medicines.        Dose/Directions    albuterol 108 (90 BASE) MCG/ACT Inhaler   Commonly known as:  PROAIR HFA/PROVENTIL HFA/VENTOLIN HFA   Used for:  Acute bronchospasm   Started by:  Nicolasa Dunne APRN CNP        Dose:  2 puff   Inhale 2 puffs into the lungs every 6 hours as needed for shortness of breath / dyspnea or wheezing   Quantity:  1 Inhaler   Refills:  0         Stop taking these medicines if you haven't already. Please contact your care team if you have questions.     azithromycin 250 MG tablet   Commonly known as:  ZITHROMAX   Stopped by:  Nicolasa Dunne APRN CNP           ranitidine 150 MG tablet   Commonly known as:  ZANTAC   Stopped by:  Nicolasa Dunne APRN CNP           simethicone 80 MG chewable tablet   Commonly known as:  MYLICON   Stopped by:  Nicolasa Dunne APRN CNP                Where to get your medicines      These medications were sent to [facility], [place] - [address]  [address], [facility] [address], [facility] [address]     Phone:  [phone]     albuterol 108 (90 BASE) MCG/ACT Inhaler                Primary Care Provider Office Phone # Fax #    Janell Zaidi [phone] [address] [facility] [address]  [facility] [address]        Equal Access to Services     ANDREI BARRAGAN AH: Hadii adenike Blanca, waaxda luqadaha, qaybta kaalmada rafael, migdalia perera  ah. So [facility] [phone].    ATENCIÓN: Si habla carlos, tiene a jean disposición servicios gratuitos de asistencia lingüística. Chang al [phone].    We comply with applicable federal civil rights laws and [place] laws. We do not discriminate on the basis of race, color, national origin, age, disability, sex, sexual orientation, or gender identity.            Thank you!     Thank you for choosing [facility]  for your care. Our goal is always to provide you with excellent care. Hearing back from our patients is one way we can continue to improve our services. Please take a few minutes to complete the written survey that you may receive in the mail after your visit with us. Thank you!             Your Updated Medication List - Protect others around you: Learn how to safely use, store and throw away your medicines at www.disposemymeds.org.          This list is accurate as of 2/16/18  8:58 AM.  Always use your most recent med list.                   Brand Name Dispense Instructions for use Diagnosis    albuterol 108 (90 BASE) MCG/ACT Inhaler    PROAIR HFA/PROVENTIL HFA/VENTOLIN HFA    1 Inhaler    Inhale 2 puffs into the lungs every 6 hours as needed for shortness of breath / dyspnea or wheezing    Acute bronchospasm       AMOXICILLIN PO

## 2018-02-16 NOTE — PROGRESS NOTES
SUBJECTIVE:   Mariaelena Euceda is a 57 year old female who presents to clinic today for the following health issues:      RESPIRATORY SYMPTOMS      Duration: 2 weeks     Description  cough and SOB-improving    Severity: moderate    Accompanying signs and symptoms: No fever; No sore throat; No myalgia; Appetite ok; No nausea or vomiting    History (predisposing factors):  Recent trip to [place] where she was given antibiotic for bronchitis; no flu test was performed    Precipitating or alleviating factors: None    Therapies tried and outcome:  Amoxicillin-3 more days and inhaler;  Feels she is doing much better now, no fever, chills, SOB or wheeze but does still have a cough ; she did not purchase the inhaler    Problem list and histories reviewed & adjusted, as indicated.  Additional history: as documented    Patient Active Problem List   Diagnosis     Lumbar pain     CARDIOVASCULAR SCREENING; LDL GOAL LESS THAN 160     Family history of diabetes mellitus     S/P laparoscopic cholecystectomy     Hematuria     Past Surgical History:   Procedure Laterality Date     BIOPSY OF BREAST, INCISIONAL  2003    left breast, benign     HYSTERECTOMY, PAP NO LONGER INDICATED       LAPAROSCOPIC CHOLECYSTECTOMY WITH CHOLANGIOGRAMS N/A 1/8/2017    Procedure: LAPAROSCOPIC CHOLECYSTECTOMY WITH CHOLANGIOGRAMS;  Surgeon: James Umana MD;  Location: [facility] OR     SALPINGO OOPHORECTOMY,R/L/RANDELL       TUBAL LIGATION  1998       Social History   Substance Use Topics     Smoking status: Never Smoker     Smokeless tobacco: Never Used     Alcohol use No     Family History   Problem Relation Age of Onset     DIABETES Mother      Prostate Cancer Father      DIABETES Father      DIABETES Brother      DIABETES Sister      Breast Cancer No family hx of      Cancer - colorectal No family hx of          Current Outpatient Prescriptions   Medication Sig Dispense Refill     AMOXICILLIN PO        albuterol (PROAIR HFA/PROVENTIL HFA/VENTOLIN HFA) 108  "(90 BASE) MCG/ACT Inhaler Inhale 2 puffs into the lungs every 6 hours as needed for shortness of breath / dyspnea or wheezing 1 Inhaler 0     No Known Allergies    Reviewed and updated as needed this visit by clinical staff       Reviewed and updated as needed this visit by Provider         ROS:  Constitutional, HEENT, cardiovascular, pulmonary, gi and gu systems are negative, except as otherwise noted.    OBJECTIVE:     /74 (BP Location: Left arm, Patient Position: Chair, Cuff Size: Adult Regular)  Pulse 74  Temp 97.5  F (36.4  C) (Oral)  Resp 16  Ht 5' 2\" (1.575 m)  Wt 144 lb (65.3 kg)  LMP 12/02/2010  SpO2 98%  BMI 26.34 kg/m2  Body mass index is 26.34 kg/(m^2).  GENERAL: healthy, alert and has dry repetitive cough  EYES: Eyes grossly normal to inspection, PERRL and conjunctivae and sclerae normal  HENT: ear canals and TM's normal, nose and mouth without ulcers or lesions  NECK: no adenopathy, no asymmetry, masses, or scars and thyroid normal to palpation  RESP: lungs clear to auscultation - no rales, rhonchi or wheezes  CV: regular rate and rhythm, normal S1 S2, no S3 or S4, no murmur, click or rub, no peripheral edema and peripheral pulses strong  MS: no gross musculoskeletal defects noted, no edema    Diagnostic Test Results:  none     ASSESSMENT/PLAN:         ICD-10-CM    1. Cough R05    2. Acute bronchospasm J98.01 albuterol (PROAIR HFA/PROVENTIL HFA/VENTOLIN HFA) 108 (90 BASE) MCG/ACT Inhaler       Patient Instructions     Drink a lot of fluids  Use the inhaler 2 puffs every 6 hours until the cough is better  Using an Inhaler  Your healthcare provider may prescribe medicine that you breathe in using a metered-dose inhaler (MDI). An inhaler sends a measured amount of medicine in a fine mist.  Step 1:    Shake the inhaler and remove the cap.    Take a deep breath and let it out.  Step 2:    Close your lips around the end of the inhaler mouthpiece. Or if you were told to use the \"open-mouth\" " method, hold the inhaler 1 to 2 inches from your mouth.  Step 3:    Breathe in slowly and deeply as you press down on the inhaler to release the medicine.    Inhale fully.  Step 4:    Hold your breath for a count of 10, or as long as you can comfortably.    Then breathe out slowly through your mouth.    Repeat these steps for each puff of medicine prescribed.             Important    If the inhaler is being used for the first time or has not been used for a while, prime it as directed by the product maker. You can find important information about the medicine in the package insert. This is the paper that comes with the medicine.    If you use more than one inhaler, make sure you know which one to use first.    Your healthcare provider or pharmacist can show you how to use your inhaler the right way. Even if you think you are using it the right way, it is still a good idea to check.   Date Last Reviewed: 10/1/2016    5783-4715 [employer]. [address] [place] [address], [place] [address]. All rights reserved. This information is not intended as a substitute for professional medical care. Always follow your healthcare professional's instructions.            IVANIA Sauceda [facility]

## 2018-02-16 NOTE — NURSING NOTE
"Chief Complaint   Patient presents with     URI       Initial /74 (BP Location: Left arm, Patient Position: Chair, Cuff Size: Adult Regular)  Pulse 74  Temp 97.5  F (36.4  C) (Oral)  Resp 16  Ht 5' 2\" (1.575 m)  Wt 144 lb (65.3 kg)  LMP 12/02/2010  SpO2 98%  BMI 26.34 kg/m2 Estimated body mass index is 26.34 kg/(m^2) as calculated from the following:    Height as of this encounter: 5' 2\" (1.575 m).    Weight as of this encounter: 144 lb (65.3 kg).  Medication Reconciliation: complete   Mikki Nazario CMA (AAMA)      "

## 2018-04-06 ENCOUNTER — HOSPITAL ENCOUNTER (OUTPATIENT)
Dept: MAMMOGRAPHY | Facility: CLINIC | Age: 58
Discharge: HOME | End: 2018-04-06
Attending: INTERNAL MEDICINE | Admitting: INTERNAL MEDICINE
Payer: COMMERCIAL

## 2018-04-06 DIAGNOSIS — Z12.31 VISIT FOR SCREENING MAMMOGRAM: ICD-10-CM

## 2018-04-06 PROCEDURE — 77067 SCR MAMMO BI INCL CAD: CPT

## 2018-12-20 NOTE — PROGRESS NOTES
SUBJECTIVE:   CC: Mariaelena Euceda is an 57 year old woman who presents for preventive health visit.     Patient is accompanied by German speaking     Healthy Habits:    Do you get at least three servings of calcium containing foods daily (dairy, green leafy vegetables, etc.)? yes    Amount of exercise or daily activities, outside of work: 5 day(s) per week    Problems taking medications regularly No    Medication side effects: No    Have you had an eye exam in the past two years? no    Do you see a dentist twice per year? yes    Do you have sleep apnea, excessive snoring or daytime drowsiness?no    Today's PHQ-2 Score:   PHQ-2 ( 1999 [employer]) 7/3/2017 6/12/2017   Q1: Little interest or pleasure in doing things 0 0   Q2: Feeling down, depressed or hopeless 0 0   PHQ-2 Score 0 0     Abuse: Current or Past(Physical, Sexual or Emotional)- No  Do you feel safe in your environment? Yes    Social History     Tobacco Use     Smoking status: Never Smoker     Smokeless tobacco: Never Used   Substance Use Topics     Alcohol use: No     Alcohol/week: 0.0 oz     If you drink alcohol do you typically have >3 drinks per day or >7 drinks per week? No                     Reviewed orders with patient.  Reviewed health maintenance and updated orders accordingly - Yes  Labs reviewed in EPIC  Patient Active Problem List   Diagnosis     Lumbar pain     CARDIOVASCULAR SCREENING; LDL GOAL LESS THAN 160     Family history of diabetes mellitus     S/P laparoscopic cholecystectomy     Hematuria     Past Surgical History:   Procedure Laterality Date     BIOPSY OF BREAST, INCISIONAL  2003    left breast, benign     HYSTERECTOMY, PAP NO LONGER INDICATED       LAPAROSCOPIC CHOLECYSTECTOMY WITH CHOLANGIOGRAMS N/A 1/8/2017    Procedure: LAPAROSCOPIC CHOLECYSTECTOMY WITH CHOLANGIOGRAMS;  Surgeon: James Umana MD;  Location: [facility]     SALPINGO OOPHORECTOMY,R/L/RANDELL       TUBAL LIGATION  1998       Social History     Tobacco Use      Smoking status: Never Smoker     Smokeless tobacco: Never Used   Substance Use Topics     Alcohol use: No     Alcohol/week: 0.0 oz     Family History   Problem Relation Age of Onset     Diabetes Mother      Prostate Cancer Father      Diabetes Father      Diabetes Brother      Diabetes Sister      Breast Cancer No family hx of      Cancer - colorectal No family hx of          Current Outpatient Medications   Medication Sig Dispense Refill     albuterol (PROAIR HFA/PROVENTIL HFA/VENTOLIN HFA) 108 (90 BASE) MCG/ACT Inhaler Inhale 2 puffs into the lungs every 6 hours as needed for shortness of breath / dyspnea or wheezing 1 Inhaler 0     AMOXICILLIN PO        No Known Allergies    Mammogram Screening: Patient over age 50, mutual decision to screen reflected in health maintenance.    Pertinent mammograms are reviewed under the imaging tab.  History of abnormal Pap smear: NO - age 30-65 PAP every 5 years with negative HPV co-testing recommended  PAP / HPV 4/6/2012 5/13/2008 1/5/2007   PAP NIL NIL NIL     Reviewed and updated as needed this visit by clinical staff       Reviewed and updated as needed this visit by Provider        ROS:  CONSTITUTIONAL: NEGATIVE for fever, chills, change in weight  INTEGUMENTARY/SKIN: POSITIVE for skin tag on right lower eyelid, NEGATIVE for worrisome rashes, moles  EYES: NEGATIVE for vision changes or irritation  ENT: NEGATIVE for ear, mouth and throat problems  RESP: NEGATIVE for significant cough or SOB  BREAST: NEGATIVE for masses, tenderness or discharge  CV: NEGATIVE for chest pain, palpitations or peripheral edema  GI: NEGATIVE for nausea, abdominal pain, heartburn, or change in bowel habits  : NEGATIVE for unusual urinary or vaginal symptoms. No vaginal bleeding.  MUSCULOSKELETAL: POSITIVE for right hip pain, NEGATIVE for significant myalgia  NEURO: NEGATIVE for weakness, dizziness or paresthesias  PSYCHIATRIC: NEGATIVE for changes in mood or affect     This document serves as  "a record of the services and decisions personally performed and made by Janell Zaidi MD. It was created on her behalf by Aishwarya Haynes, a trained medical scribe. The creation of this document is based on the provider's statements to the medical scribe.  Aishwarya Haynes 11:18 AM December 21, 2018  OBJECTIVE:   /79 (BP Location: Right arm, Patient Position: Chair, Cuff Size: Adult Large)   Pulse 77   Temp 97  F (36.1  C) (Oral)   Ht 1.575 m (5' 2\")   Wt 63.5 kg (140 lb)   LMP 12/02/2010   SpO2 98%   Breastfeeding? No   BMI 25.61 kg/m       EXAM:  GENERAL APPEARANCE: overweight, alert and no distress  EYES: Eyes grossly normal to inspection, PERRL and conjunctivae and sclerae normal  HENT: ear canals and TM's normal, nose and mouth without ulcers or lesions, oropharynx clear and oral mucous membranes moist  NECK: no adenopathy, no asymmetry, masses, or scars and thyroid normal to palpation  RESP: lungs clear to auscultation - no rales, rhonchi or wheezes  BREAST: normal without masses, tenderness or nipple discharge and no palpable axillary masses or adenopathy  CV: regular rate and rhythm, normal S1 S2, no S3 or S4, no murmur, click or rub, slight peripheral edema and peripheral pulses strong  ABDOMEN: soft, nontender, no hepatosplenomegaly, no masses and bowel sounds normal  MS: pain consistent with trochanteric bursitis, no musculoskeletal defects are noted and gait is age appropriate without ataxia  SKIN: numerous moles and skin tags, no suspicious rashes  Numerous skin tags , freckles and moles  NEURO: Normal strength and tone, sensory exam grossly normal, mentation intact and speech normal  PSYCH: mentation appears normal and affect normal/bright    Diagnostic Test Results:  No results found for this or any previous visit (from the past 24 hour(s)).  ASSESSMENT/PLAN:   Mariaelena was seen today for physical.    Diagnoses and all orders for this visit:    Routine history and physical examination of adult  Patient is " here for wellness visit  Up to date on mammogram  Up to date on colonoscopy  No family history of colon cancer  Due for colonoscopy in   PAP smear no longer needed due to hysterectomy status  Patient has declined flu shot today  Educated patient about new shingles vaccine  Reports she plans to get after coming back from vacation   I will send patient letter of lab results within 1 week  Will call patient if anything needs immediate attention    Screening for lipid disorders  -     Lipid panel reflex to direct LDL Fasting    Screening for thyroid disorder  -     TSH with free T4 reflex    Screening for HIV (human immunodeficiency virus)  Educated patient that CDC is recommending a one-time HIV screen    Screening for deficiency anemia  -     CBC with platelets    Medication management  -     Basic metabolic panel    Trochanteric bursitis of right hip  Patient complains of right hip pain on lateral side  Pain is consistent with trochanteric bursitis   Start 500 mg naproxen twice daily as needed  Advised to take with food and to not take for more than 2 weeks  Let me know if pain not improving, will discuss follow up with orthopedist for cortisone injection  -     naproxen (NAPROSYN DR) 500 MG EC tablet; Take 500 mg by mouth 2 times daily as needed    Numerous moles  Patient reports she is interested in having skin tag on right lower eyelid removed because it bothers her  Multiple moles and skin tags were also noted upon examination   Advised to schedule for skin specialist  She will schedule  -     SKIN CARE REFERRAL    Skin tag  See above  -     SKIN CARE REFERRAL    COUNSELING:   Reviewed preventive health counseling, as reflected in patient instructions       Regular exercise       Healthy diet/nutrition       Immunizations    Declined: Influenza due to Other not interested             HIV screeninx in teen years, 1x in adult years, and at intervals if high risk    BP Readings from Last 1 Encounters:  "  02/16/18 115/74     Estimated body mass index is 26.34 kg/m  as calculated from the following:    Height as of 2/16/18: 1.575 m (5' 2\").    Weight as of 2/16/18: 65.3 kg (144 lb).    BP Screening:   Last 3 BP Readings:    BP Readings from Last 3 Encounters:   12/21/18 131/79   02/16/18 115/74   07/03/17 112/72     The following was recommended to the patient:  Re-screen BP within a year and recommended lifestyle modifications  Weight management plan: Discussed healthy diet and exercise guidelines     reports that  has never smoked. she has never used smokeless tobacco.    Patient Instructions   Labs today  appendectomy  Skin specialist  Take medication with food and do not take longer than 2 weeks  There is a new Shingles vaccine called SHINGRIX  It's is a series of two shots, 2-6 months apart  It is considered more than 90% effective  Please go to our pharmacy located in [address] to get the vaccine  Follow up in one year  Seek sooner medical attention if there is any worsening of symptoms or problems    Counseling Resources:  ATP IV Guidelines  Pooled Cohorts Equation Calculator  Breast Cancer Risk Calculator  FRAX Risk Assessment  ICSI Preventive Guidelines  Dietary Guidelines for Americans, 2010  USDA's MyPlate  ASA Prophylaxis  Lung CA Screening    The information in this document, created by the medical scribe for me, accurately reflects the services I personally performed and the decisions made by me. I have reviewed and approved this document for accuracy prior to leaving the patient care area.  December 21, 2018 11:35 AM    Janell Zaidi MD  [facility]  "

## 2018-12-20 NOTE — PATIENT INSTRUCTIONS
Preventive Health Recommendations  Female Ages 50 - 64    Yearly exam: See your health care provider every year in order to  o Review health changes.   o Discuss preventive care.    o Review your medicines if your doctor has prescribed any.      Get a Pap test every three years (unless you have an abnormal result and your provider advises testing more often).    If you get Pap tests with HPV test, you only need to test every 5 years, unless you have an abnormal result.     You do not need a Pap test if your uterus was removed (hysterectomy) and you have not had cancer.    You should be tested each year for STDs (sexually transmitted diseases) if you're at risk.     Have a mammogram every 1 to 2 years.    Have a colonoscopy at age 50, or have a yearly FIT test (stool test). These exams screen for colon cancer.      Have a cholesterol test every 5 years, or more often if advised.    Have a diabetes test (fasting glucose) every three years. If you are at risk for diabetes, you should have this test more often.     If you are at risk for osteoporosis (brittle bone disease), think about having a bone density scan (DEXA).    Shots: Get a flu shot each year. Get a tetanus shot every 10 years.    Nutrition:     Eat at least 5 servings of fruits and vegetables each day.    Eat whole-grain bread, whole-wheat pasta and brown rice instead of white grains and rice.    Get adequate Calcium and Vitamin D.     Lifestyle    Exercise at least 150 minutes a week (30 minutes a day, 5 days a week). This will help you control your weight and prevent disease.    Limit alcohol to one drink per day.    No smoking.     Wear sunscreen to prevent skin cancer.     See your dentist every six months for an exam and cleaning.    See your eye doctor every 1 to 2 years.    Labs today  appendectomy  Skin specialist  Take medication with food and do not take longer than 2 weeks  There is a new Shingles vaccine called SHINGRIX  It's is a series of  two shots, 2-6 months apart  It is considered more than 90% effective  Please go to our pharmacy located in [address] to get the vaccine    Follow up in one year  Seek sooner medical attention if there is any worsening of symptoms or problems

## 2018-12-21 ENCOUNTER — OFFICE VISIT (OUTPATIENT)
Dept: FAMILY MEDICINE | Facility: CLINIC | Age: 58
End: 2018-12-21
Payer: COMMERCIAL

## 2018-12-21 VITALS
DIASTOLIC BLOOD PRESSURE: 79 MMHG | BODY MASS INDEX: 25.76 KG/M2 | HEART RATE: 77 BPM | OXYGEN SATURATION: 98 % | TEMPERATURE: 97 F | SYSTOLIC BLOOD PRESSURE: 131 MMHG | WEIGHT: 140 LBS | HEIGHT: 62 IN

## 2018-12-21 DIAGNOSIS — Z13.29 SCREENING FOR THYROID DISORDER: ICD-10-CM

## 2018-12-21 DIAGNOSIS — Z13.220 SCREENING FOR LIPID DISORDERS: ICD-10-CM

## 2018-12-21 DIAGNOSIS — Z11.4 SCREENING FOR HIV (HUMAN IMMUNODEFICIENCY VIRUS): ICD-10-CM

## 2018-12-21 DIAGNOSIS — Z00.00 ROUTINE HISTORY AND PHYSICAL EXAMINATION OF ADULT: Primary | ICD-10-CM

## 2018-12-21 DIAGNOSIS — M70.61 TROCHANTERIC BURSITIS OF RIGHT HIP: ICD-10-CM

## 2018-12-21 DIAGNOSIS — Z79.899 MEDICATION MANAGEMENT: ICD-10-CM

## 2018-12-21 DIAGNOSIS — L91.8 SKIN TAG: ICD-10-CM

## 2018-12-21 DIAGNOSIS — D22.9 NUMEROUS MOLES: ICD-10-CM

## 2018-12-21 DIAGNOSIS — Z13.0 SCREENING FOR DEFICIENCY ANEMIA: ICD-10-CM

## 2018-12-21 LAB
ERYTHROCYTE [DISTWIDTH] IN BLOOD BY AUTOMATED COUNT: 12.7 % (ref 10–15)
HCT VFR BLD AUTO: 40.3 % (ref 35–47)
HGB BLD-MCNC: 13.3 G/DL (ref 11.7–15.7)
MCH RBC QN AUTO: 29.8 PG (ref 26.5–33)
MCHC RBC AUTO-ENTMCNC: 33 G/DL (ref 31.5–36.5)
MCV RBC AUTO: 90 FL (ref 78–100)
PLATELET # BLD AUTO: 330 10E9/L (ref 150–450)
RBC # BLD AUTO: 4.46 10E12/L (ref 3.8–5.2)
WBC # BLD AUTO: 6.8 10E9/L (ref 4–11)

## 2018-12-21 PROCEDURE — 84443 ASSAY THYROID STIM HORMONE: CPT | Performed by: INTERNAL MEDICINE

## 2018-12-21 PROCEDURE — 36415 COLL VENOUS BLD VENIPUNCTURE: CPT | Performed by: INTERNAL MEDICINE

## 2018-12-21 PROCEDURE — 99396 PREV VISIT EST AGE 40-64: CPT | Performed by: INTERNAL MEDICINE

## 2018-12-21 PROCEDURE — 80048 BASIC METABOLIC PNL TOTAL CA: CPT | Performed by: INTERNAL MEDICINE

## 2018-12-21 PROCEDURE — 80061 LIPID PANEL: CPT | Performed by: INTERNAL MEDICINE

## 2018-12-21 PROCEDURE — 99212 OFFICE O/P EST SF 10 MIN: CPT | Mod: 25 | Performed by: INTERNAL MEDICINE

## 2018-12-21 PROCEDURE — 85027 COMPLETE CBC AUTOMATED: CPT | Performed by: INTERNAL MEDICINE

## 2018-12-21 RX ORDER — NAPROXEN 500 MG/1
500 TABLET ORAL 2 TIMES DAILY PRN
Qty: 30 TABLET | Refills: 1 | Status: SHIPPED | OUTPATIENT
Start: 2018-12-21 | End: 2020-02-27

## 2018-12-21 ASSESSMENT — MIFFLIN-ST. JEOR: SCORE: 1183.29

## 2018-12-21 NOTE — LETTER
[address] [facility]  [address]  [place]  [address]  Tel: [phone]    December 24, 2018    Mariaelena Ok  [address] [facility] [address]        Dear MsYuliya Euceda,    This is to inform you regarding your test result.    TSH which is thyroid hormone is normal.  Your total cholesterol is normal.  The triglycerides are high. Lowering  the amount of sugar ,alcohol and sweets in the diet helps to control this.Exercise and weight loss helps.  HDL which is called good cholesterol is low.  Your LDL cholesterol is normal.  This is often call bad cholesterol and high levels increase the risk for heart attacks and strokes.  Eat low cholesterol low fat  diet and do regular physical activity.  CBC result which includes Hemoglobin and  Platelet Counts is satisfactory.  Basic metabolic panel which includes electrolytes and kidney fucntion is normal    Sincerely,    Janell Zaidi MD/[facility]          Enclosure: Lab Results  Results for orders placed or performed in visit on 12/21/18   TSH with free T4 reflex   Result Value Ref Range    TSH 1.44 0.40 - 4.00 mU/L   Lipid panel reflex to direct LDL Fasting   Result Value Ref Range    Cholesterol 184 <200 mg/dL    Triglycerides 233 (H) <150 mg/dL    HDL Cholesterol 44 (L) >49 mg/dL    LDL Cholesterol Calculated 93 <100 mg/dL    Non HDL Cholesterol 140 (H) <130 mg/dL   Basic metabolic panel   Result Value Ref Range    Sodium 138 133 - 144 mmol/L    Potassium 3.6 3.4 - 5.3 mmol/L    Chloride 105 94 - 109 mmol/L    Carbon Dioxide 25 20 - 32 mmol/L    Anion Gap 8 3 - 14 mmol/L    Glucose 96 70 - 99 mg/dL    Urea Nitrogen 12 7 - 30 mg/dL    Creatinine 0.51 (L) 0.52 - 1.04 mg/dL    GFR Estimate >90 >60 mL/min/[1.73_m2]    GFR Estimate If Black >90 >60 mL/min/[1.73_m2]    Calcium 8.9 8.5 - 10.1 mg/dL   CBC with platelets   Result Value Ref Range    WBC 6.8 4.0 - 11.0 10e9/L    RBC Count 4.46 3.8 - 5.2 10e12/L    Hemoglobin 13.3 11.7 - 15.7 g/dL     Hematocrit 40.3 35.0 - 47.0 %    MCV 90 78 - 100 fl    MCH 29.8 26.5 - 33.0 pg    MCHC 33.0 31.5 - 36.5 g/dL    RDW 12.7 10.0 - 15.0 %    Platelet Count 330 150 - 450 10e9/L

## 2018-12-22 LAB
ANION GAP SERPL CALCULATED.3IONS-SCNC: 8 MMOL/L (ref 3–14)
BUN SERPL-MCNC: 12 MG/DL (ref 7–30)
CALCIUM SERPL-MCNC: 8.9 MG/DL (ref 8.5–10.1)
CHLORIDE SERPL-SCNC: 105 MMOL/L (ref 94–109)
CHOLEST SERPL-MCNC: 184 MG/DL
CO2 SERPL-SCNC: 25 MMOL/L (ref 20–32)
CREAT SERPL-MCNC: 0.51 MG/DL (ref 0.52–1.04)
GFR SERPL CREATININE-BSD FRML MDRD: >90 ML/MIN/{1.73_M2}
GLUCOSE SERPL-MCNC: 96 MG/DL (ref 70–99)
HDLC SERPL-MCNC: 44 MG/DL
LDLC SERPL CALC-MCNC: 93 MG/DL
NONHDLC SERPL-MCNC: 140 MG/DL
POTASSIUM SERPL-SCNC: 3.6 MMOL/L (ref 3.4–5.3)
SODIUM SERPL-SCNC: 138 MMOL/L (ref 133–144)
TRIGL SERPL-MCNC: 233 MG/DL
TSH SERPL DL<=0.005 MIU/L-ACNC: 1.44 MU/L (ref 0.4–4)

## 2018-12-22 NOTE — RESULT ENCOUNTER NOTE
Please notify patient by sending following letter with copy of test results      Bo Ferrell,    This is to inform you regarding your test result.    TSH which is thyroid hormone is normal.  Your total cholesterol is normal.  The triglycerides are high. Lowering  the amount of sugar ,alcohol and sweets in the diet helps to control this.Exercise and weight loss helps.  HDL which is called good cholesterol is low.  Your LDL cholesterol is normal.  This is often call bad cholesterol and high levels increase the risk for heart attacks and strokes.  Eat low cholesterol low fat  diet and do regular physical activity.  CBC result which includes Hemoglobin and  Platelet Counts is satisfactory.  Basic metabolic panel which includes electrolytes and kidney fucntion is normal      Sincerely,      Dr.Nasima Dejuan MD,FACP

## 2019-04-08 ENCOUNTER — HOSPITAL ENCOUNTER (OUTPATIENT)
Dept: MAMMOGRAPHY | Facility: CLINIC | Age: 59
Discharge: HOME | End: 2019-04-08
Attending: INTERNAL MEDICINE | Admitting: INTERNAL MEDICINE
Payer: COMMERCIAL

## 2019-04-08 DIAGNOSIS — Z12.31 VISIT FOR SCREENING MAMMOGRAM: ICD-10-CM

## 2019-04-08 PROCEDURE — 77067 SCR MAMMO BI INCL CAD: CPT

## 2019-07-23 ENCOUNTER — OFFICE VISIT (OUTPATIENT)
Dept: URGENT CARE | Facility: URGENT CARE | Age: 59
End: 2019-07-23
Payer: COMMERCIAL

## 2019-07-23 VITALS
TEMPERATURE: 99.2 F | SYSTOLIC BLOOD PRESSURE: 120 MMHG | OXYGEN SATURATION: 97 % | HEART RATE: 81 BPM | BODY MASS INDEX: 26.56 KG/M2 | DIASTOLIC BLOOD PRESSURE: 60 MMHG | WEIGHT: 145.2 LBS

## 2019-07-23 DIAGNOSIS — J06.9 VIRAL URI WITH COUGH: Primary | ICD-10-CM

## 2019-07-23 DIAGNOSIS — R07.0 THROAT PAIN: ICD-10-CM

## 2019-07-23 LAB
DEPRECATED S PYO AG THROAT QL EIA: NORMAL
SPECIMEN SOURCE: NORMAL

## 2019-07-23 PROCEDURE — 87880 STREP A ASSAY W/OPTIC: CPT | Performed by: FAMILY MEDICINE

## 2019-07-23 PROCEDURE — 99213 OFFICE O/P EST LOW 20 MIN: CPT | Performed by: FAMILY MEDICINE

## 2019-07-23 PROCEDURE — 87081 CULTURE SCREEN ONLY: CPT | Performed by: FAMILY MEDICINE

## 2019-07-23 RX ORDER — PREDNISONE 20 MG/1
20 TABLET ORAL DAILY
Qty: 2 TABLET | Refills: 0 | Status: SHIPPED | OUTPATIENT
Start: 2019-07-23 | End: 2019-10-18

## 2019-07-24 LAB
BACTERIA SPEC CULT: NORMAL
SPECIMEN SOURCE: NORMAL

## 2019-07-24 NOTE — PATIENT INSTRUCTIONS
Prednisone once a day for two days    Ibuprofen 400-600mg every 4 hours as needed for discomfort    If symptoms get worse return to see us

## 2019-07-24 NOTE — PROGRESS NOTES
Subjective:   Mariaelena Euceda is a 58 year old female who presents for   Chief Complaint   Patient presents with     Urgent Care     Ear pain and throat pain. Eyes are very watery. 3xdays      Low grade fever it feels like and took tylenol about 4 hours ago.   Sore throat accompanied by cough. Having some eye watering but no discharge that is colored.   No other sick individuals at home       Patient Active Problem List    Diagnosis Date Noted     Hematuria 06/13/2017     Priority: Medium     S/P laparoscopic cholecystectomy 06/12/2017     Priority: Medium     Family history of diabetes mellitus 03/01/2013     Priority: Medium     CARDIOVASCULAR SCREENING; LDL GOAL LESS THAN 160 10/31/2010     Priority: Medium     Lumbar pain 05/12/2010     Priority: Medium       Current Outpatient Medications   Medication     predniSONE (DELTASONE) 20 MG tablet     albuterol (PROAIR HFA/PROVENTIL HFA/VENTOLIN HFA) 108 (90 BASE) MCG/ACT Inhaler     AMOXICILLIN PO     naproxen (NAPROSYN DR) 500 MG EC tablet     No current facility-administered medications for this visit.        ROS:  As above per HPI    Objective:   /60   Pulse 81   Temp 99.2  F (37.3  C) (Tympanic)   Wt 65.9 kg (145 lb 3.2 oz)   LMP 12/02/2010   SpO2 97%   BMI 26.56 kg/m  , Body mass index is 26.56 kg/m .  Gen:  NAD, well-nourished, sitting in chair comfortably  HEENT: EOMI, sclera anicteric, Head normocephalic, ; nares patent; moist mucous membranes. 3+ left tonsil, 2+ right tonsil without exudates, no trismus, normal Tm's bilaterally with slight clear fluid congestion R> L  Neck: trachea midline, no thyromegaly  CV:  Hemodynamically stable, RRR  Pulm:  no increased work of breathing , CTAB, no wheezes/rales/rhonchi   Extrem: no cyanosis, edema or clubbing  Skin: no obvious rashes or abnormalities  Psych: Euthymic, linear thoughts, normal rate of speech    Results for orders placed or performed in visit on 07/23/19   Rapid strep screen   Result Value  Ref Range    Specimen Description Throat     Rapid Strep A Screen       NEGATIVE: No Group A streptococcal antigen detected by immunoassay, await culture report.       Assessment & Plan:   Mariaelena Euceda, 58 year old female who presents with:  Viral URI with cough  Sore throat is most bothersome symptoms, I see no signs of peritonsillar abscess thus I'm recommending ibuprofen and a 2 day course of low dose prednisone. Expect improvement over the next 5 days. Cough she feels like she can manage - her lung exam was clear, has stable vitals thus xray deferred  - predniSONE (DELTASONE) 20 MG tablet  Dispense: 2 tablet; Refill: 0    Throat pain  - Rapid strep screen  - Beta strep group A culture      Daquan Duncan MD   [facility]    The use of Dragon/Fannect dictation services may have been used to construct the content in this note; any grammatical or spelling errors are non-intentional. Please contact the author of this note directly if you are in need of any clarification.

## 2019-10-18 ENCOUNTER — OFFICE VISIT (OUTPATIENT)
Dept: FAMILY MEDICINE | Facility: CLINIC | Age: 59
End: 2019-10-18
Payer: COMMERCIAL

## 2019-10-18 VITALS
DIASTOLIC BLOOD PRESSURE: 78 MMHG | OXYGEN SATURATION: 99 % | TEMPERATURE: 97.1 F | BODY MASS INDEX: 26.89 KG/M2 | WEIGHT: 147 LBS | SYSTOLIC BLOOD PRESSURE: 132 MMHG | HEART RATE: 70 BPM

## 2019-10-18 DIAGNOSIS — R10.31 ABDOMINAL PAIN, RIGHT LOWER QUADRANT: Primary | ICD-10-CM

## 2019-10-18 DIAGNOSIS — R82.90 NONSPECIFIC FINDING ON EXAMINATION OF URINE: ICD-10-CM

## 2019-10-18 LAB
ALBUMIN UR-MCNC: NEGATIVE MG/DL
APPEARANCE UR: CLEAR
BASOPHILS # BLD AUTO: 0.1 10E9/L (ref 0–0.2)
BASOPHILS NFR BLD AUTO: 0.6 %
BILIRUB UR QL STRIP: NEGATIVE
COLOR UR AUTO: YELLOW
DIFFERENTIAL METHOD BLD: NORMAL
EOSINOPHIL # BLD AUTO: 0.3 10E9/L (ref 0–0.7)
EOSINOPHIL NFR BLD AUTO: 3.2 %
ERYTHROCYTE [DISTWIDTH] IN BLOOD BY AUTOMATED COUNT: 12.7 % (ref 10–15)
GLUCOSE UR STRIP-MCNC: NEGATIVE MG/DL
HCT VFR BLD AUTO: 39.6 % (ref 35–47)
HGB BLD-MCNC: 13.5 G/DL (ref 11.7–15.7)
HGB UR QL STRIP: ABNORMAL
KETONES UR STRIP-MCNC: NEGATIVE MG/DL
LEUKOCYTE ESTERASE UR QL STRIP: ABNORMAL
LYMPHOCYTES # BLD AUTO: 3.6 10E9/L (ref 0.8–5.3)
LYMPHOCYTES NFR BLD AUTO: 41 %
MCH RBC QN AUTO: 30.8 PG (ref 26.5–33)
MCHC RBC AUTO-ENTMCNC: 34.1 G/DL (ref 31.5–36.5)
MCV RBC AUTO: 90 FL (ref 78–100)
MONOCYTES # BLD AUTO: 1 10E9/L (ref 0–1.3)
MONOCYTES NFR BLD AUTO: 11.4 %
NEUTROPHILS # BLD AUTO: 3.9 10E9/L (ref 1.6–8.3)
NEUTROPHILS NFR BLD AUTO: 43.8 %
NITRATE UR QL: NEGATIVE
NON-SQ EPI CELLS #/AREA URNS LPF: ABNORMAL /LPF
PH UR STRIP: 5.5 PH (ref 5–7)
PLATELET # BLD AUTO: 297 10E9/L (ref 150–450)
RBC # BLD AUTO: 4.38 10E12/L (ref 3.8–5.2)
RBC #/AREA URNS AUTO: ABNORMAL /HPF
SOURCE: ABNORMAL
SP GR UR STRIP: 1.02 (ref 1–1.03)
UROBILINOGEN UR STRIP-ACNC: 0.2 EU/DL (ref 0.2–1)
WBC # BLD AUTO: 8.9 10E9/L (ref 4–11)
WBC #/AREA URNS AUTO: ABNORMAL /HPF

## 2019-10-18 PROCEDURE — 85025 COMPLETE CBC W/AUTO DIFF WBC: CPT | Performed by: NURSE PRACTITIONER

## 2019-10-18 PROCEDURE — 87088 URINE BACTERIA CULTURE: CPT | Performed by: NURSE PRACTITIONER

## 2019-10-18 PROCEDURE — 87086 URINE CULTURE/COLONY COUNT: CPT | Performed by: NURSE PRACTITIONER

## 2019-10-18 PROCEDURE — 36415 COLL VENOUS BLD VENIPUNCTURE: CPT | Performed by: NURSE PRACTITIONER

## 2019-10-18 PROCEDURE — 99214 OFFICE O/P EST MOD 30 MIN: CPT | Performed by: NURSE PRACTITIONER

## 2019-10-18 PROCEDURE — 81001 URINALYSIS AUTO W/SCOPE: CPT | Performed by: NURSE PRACTITIONER

## 2019-10-18 NOTE — LETTER
[address] [facility]  [address]  [place]  [address]  Tel: [phone]    October 21, 2019    Mariaelena Ok  [address] [facility] [address]        Dear MsYuliya Euceda,    These are the test results that show us you do not have an elevated WBC which would mean an infection in your organs,   your hemoglobin tells us that you are not anemic for any reason.  And you urine has some positive leukocyte esterase and  5-10 WBCs but we will wait for the culture to show us if you have an infection in the urine    If you have any further questions or problems, please contact our office.      Sincerely,    Nicolasa Dunne NP/ Pari Javier, CMA  Results for orders placed or performed in visit on 10/18/19   UA reflex to Microscopic and Culture   Result Value Ref Range    Color Urine Yellow     Appearance Urine Clear     Glucose Urine Negative NEG^Negative mg/dL    Bilirubin Urine Negative NEG^Negative    Ketones Urine Negative NEG^Negative mg/dL    Specific Gravity Urine 1.025 1.003 - 1.035    Blood Urine Small (A) NEG^Negative    pH Urine 5.5 5.0 - 7.0 pH    Protein Albumin Urine Negative NEG^Negative mg/dL    Urobilinogen Urine 0.2 0.2 - 1.0 EU/dL    Nitrite Urine Negative NEG^Negative    Leukocyte Esterase Urine Moderate (A) NEG^Negative    Source Midstream Urine    CBC with platelets differential   Result Value Ref Range    WBC 8.9 4.0 - 11.0 10e9/L    RBC Count 4.38 3.8 - 5.2 10e12/L    Hemoglobin 13.5 11.7 - 15.7 g/dL    Hematocrit 39.6 35.0 - 47.0 %    MCV 90 78 - 100 fl    MCH 30.8 26.5 - 33.0 pg    MCHC 34.1 31.5 - 36.5 g/dL    RDW 12.7 10.0 - 15.0 %    Platelet Count 297 150 - 450 10e9/L    % Neutrophils 43.8 %    % Lymphocytes 41.0 %    % Monocytes 11.4 %    % Eosinophils 3.2 %    % Basophils 0.6 %    Absolute Neutrophil 3.9 1.6 - 8.3 10e9/L    Absolute Lymphocytes 3.6 0.8 - 5.3 10e9/L    Absolute Monocytes 1.0 0.0 - 1.3 10e9/L    Absolute Eosinophils 0.3 0.0 - 0.7 10e9/L     Absolute Basophils 0.1 0.0 - 0.2 10e9/L    Diff Method Automated Method    Urine Microscopic   Result Value Ref Range    WBC Urine 5-10 (A) OTO5^0 - 5 /HPF    RBC Urine O - 2 OTO2^O - 2 /HPF    Squamous Epithelial /LPF Urine Many (A) FEW^Few /LPF   Urine Culture Aerobic Bacterial   Result Value Ref Range    Specimen Description Midstream Urine     Culture Micro (A)      >100,000 colonies/mL  Streptococcus agalactiae sero group B  Susceptibility testing not routinely done on this organism from the genitourinary tract.   Our antibiogram indicates that Group B streptococci are susceptible to ampicillin,   penicillin, vancomycin and the cephalosporins. Susceptibility testing must be requested   within 5 days.      Culture Micro (A)      50,000 to 100,000 colonies/mL  beta hemolytic   Streptococcus anginosus  Susceptibility testing not routinely done                 Enclosure: Lab Results

## 2019-10-18 NOTE — RESULT ENCOUNTER NOTE
These are the test results that show us you do not have an elevated WBC which would mean an infection in your organs,   your hemoglobin tells us that you are not anemic for any reason.  And you urine has some positive leukocyte esterase and  5-10 WBCs but we will wait for the culture to show us if you have an infection in the urine

## 2019-10-18 NOTE — PROGRESS NOTES
Subjective     Mariaelena Euceda is a 58 year old female who presents to clinic today for the following health issues:    TYSON Ferrell is Armenian speaking and we do not have an  today:   Lower right quadrant pain ongoing for about 6 days. Pain varies in severity from very mild to sharp and severe. Urinary frequency present, pain radiates towards the back and down the right leg.  H/o robert, still has appendix, uterus and ovaries.  No nausea or vomiting, no fever , feels chilly when having pain  Pain is better after BM, has daily BMs  2 years ago CT to evaluate hematuria- no kidney stones    Patient Active Problem List   Diagnosis     Lumbar pain     CARDIOVASCULAR SCREENING; LDL GOAL LESS THAN 160     Family history of diabetes mellitus     S/P laparoscopic cholecystectomy     Hematuria     Past Surgical History:   Procedure Laterality Date     BIOPSY OF BREAST, INCISIONAL  2003    left breast, benign     HYSTERECTOMY, PAP NO LONGER INDICATED       LAPAROSCOPIC CHOLECYSTECTOMY WITH CHOLANGIOGRAMS N/A 1/8/2017    Procedure: LAPAROSCOPIC CHOLECYSTECTOMY WITH CHOLANGIOGRAMS;  Surgeon: James Umana MD;  Location: [facility]     SALPINGO OOPHORECTOMY,R/L/RANDELL       TUBAL LIGATION  1998       Social History     Tobacco Use     Smoking status: Never Smoker     Smokeless tobacco: Never Used   Substance Use Topics     Alcohol use: No     Alcohol/week: 0.0 standard drinks     Family History   Problem Relation Age of Onset     Diabetes Mother      Prostate Cancer Father      Diabetes Father      Diabetes Brother      Diabetes Sister      Breast Cancer No family hx of      Cancer - colorectal No family hx of          Current Outpatient Medications   Medication Sig Dispense Refill     albuterol (PROAIR HFA/PROVENTIL HFA/VENTOLIN HFA) 108 (90 BASE) MCG/ACT Inhaler Inhale 2 puffs into the lungs every 6 hours as needed for shortness of breath / dyspnea or wheezing 1 Inhaler 0     naproxen (NAPROSYN DR) 500 MG EC tablet  Take 500 mg by mouth 2 times daily as needed 30 tablet 1     No Known Allergies      Reviewed and updated as needed this visit by Provider       Review of Systems   ROS COMP: Constitutional, HEENT, cardiovascular, pulmonary, GI, , musculoskeletal, neuro, skin, endocrine and psych systems are negative, except as otherwise noted.      Objective      LMP 12/02/2010   /78 (BP Location: Right arm, Patient Position: Sitting, Cuff Size: Adult Regular)   Pulse 70   Temp 97.1  F (36.2  C) (Oral)   Wt 66.7 kg (147 lb)   LMP 12/02/2010   SpO2 99%   BMI 26.89 kg/m      Physical Exam   GENERAL: healthy, alert and moderate distress  NECK: no adenopathy, no asymmetry, masses, or scars and thyroid normal to palpation  RESP: lungs clear to auscultation - no rales, rhonchi or wheezes  CV: regular rate and rhythm, normal S1 S2, no S3 or S4, no murmur, click or rub, no peripheral edema and peripheral pulses strong  ABDOMEN: soft, mod to severe pain of right low quadrant with palpation, no rebound pain or referred pain, no hepatosplenomegaly, no masses and bowel sounds normal  MS: no gross musculoskeletal defects noted, no edema    Diagnostic Test Results:  Results for orders placed or performed in visit on 10/18/19 (from the past 24 hour(s))   UA reflex to Microscopic and Culture   Result Value Ref Range    Color Urine Yellow     Appearance Urine Clear     Glucose Urine Negative NEG^Negative mg/dL    Bilirubin Urine Negative NEG^Negative    Ketones Urine Negative NEG^Negative mg/dL    Specific Gravity Urine 1.025 1.003 - 1.035    Blood Urine Small (A) NEG^Negative    pH Urine 5.5 5.0 - 7.0 pH    Protein Albumin Urine Negative NEG^Negative mg/dL    Urobilinogen Urine 0.2 0.2 - 1.0 EU/dL    Nitrite Urine Negative NEG^Negative    Leukocyte Esterase Urine Moderate (A) NEG^Negative    Source Midstream Urine    CBC with platelets differential   Result Value Ref Range    WBC 8.9 4.0 - 11.0 10e9/L    RBC Count 4.38 3.8 - 5.2  10e12/L    Hemoglobin 13.5 11.7 - 15.7 g/dL    Hematocrit 39.6 35.0 - 47.0 %    MCV 90 78 - 100 fl    MCH 30.8 26.5 - 33.0 pg    MCHC 34.1 31.5 - 36.5 g/dL    RDW 12.7 10.0 - 15.0 %    Platelet Count 297 150 - 450 10e9/L    % Neutrophils 43.8 %    % Lymphocytes 41.0 %    % Monocytes 11.4 %    % Eosinophils 3.2 %    % Basophils 0.6 %    Absolute Neutrophil 3.9 1.6 - 8.3 10e9/L    Absolute Lymphocytes 3.6 0.8 - 5.3 10e9/L    Absolute Monocytes 1.0 0.0 - 1.3 10e9/L    Absolute Eosinophils 0.3 0.0 - 0.7 10e9/L    Absolute Basophils 0.1 0.0 - 0.2 10e9/L    Diff Method Automated Method    Urine Microscopic   Result Value Ref Range    WBC Urine 5-10 (A) OTO5^0 - 5 /HPF    RBC Urine O - 2 OTO2^O - 2 /HPF    Squamous Epithelial /LPF Urine Many (A) FEW^Few /LPF           Assessment & Plan       ICD-10-CM    1. Abdominal pain, right lower quadrant R10.31 UA reflex to Microscopic and Culture     CBC with platelets differential   urine culture is pending  Pain seems more extreme and more localized than I would expect from a UTI.  I have ordered a CT of abdomen to R/O appendicitis, but she has deferred exam until 10/22  She has been instructed to go to ED over the weekend if she worsens        IVANIA Sauceda [facility]

## 2019-10-20 LAB
BACTERIA SPEC CULT: ABNORMAL
BACTERIA SPEC CULT: ABNORMAL
SPECIMEN SOURCE: ABNORMAL

## 2019-10-21 ENCOUNTER — TELEPHONE (OUTPATIENT)
Dept: FAMILY MEDICINE | Facility: CLINIC | Age: 59
End: 2019-10-21

## 2019-10-21 DIAGNOSIS — N30.00 ACUTE CYSTITIS WITHOUT HEMATURIA: Primary | ICD-10-CM

## 2019-10-21 RX ORDER — CEPHALEXIN 500 MG/1
500 CAPSULE ORAL 2 TIMES DAILY
Qty: 14 CAPSULE | Refills: 0 | Status: SHIPPED | OUTPATIENT
Start: 2019-10-21 | End: 2020-02-27

## 2019-10-22 ENCOUNTER — TELEPHONE (OUTPATIENT)
Dept: FAMILY MEDICINE | Facility: CLINIC | Age: 59
End: 2019-10-22

## 2019-10-22 NOTE — TELEPHONE ENCOUNTER
"Nicolasa Dunne APRN CNP  P [facility] Triage             Would you please try to contact her today about the antibiotic waiting for her at her pharmacy ?      Called patient: No answer, left VM with details that \"there is a medication at the pharmacy based on results from recent visit\" Asked to callback to speak with a nurse.     Christine HERNANDEZ RN    "

## 2019-12-01 ENCOUNTER — TRANSFERRED RECORDS (OUTPATIENT)
Dept: HEALTH INFORMATION MANAGEMENT | Facility: CLINIC | Age: 59
End: 2019-12-01

## 2019-12-10 ENCOUNTER — TRANSFERRED RECORDS (OUTPATIENT)
Dept: HEALTH INFORMATION MANAGEMENT | Facility: CLINIC | Age: 59
End: 2019-12-10

## 2020-01-02 ENCOUNTER — TRANSFERRED RECORDS (OUTPATIENT)
Dept: HEALTH INFORMATION MANAGEMENT | Facility: CLINIC | Age: 60
End: 2020-01-02

## 2020-02-27 ENCOUNTER — OFFICE VISIT (OUTPATIENT)
Dept: FAMILY MEDICINE | Facility: CLINIC | Age: 60
End: 2020-02-27
Payer: COMMERCIAL

## 2020-02-27 VITALS
HEIGHT: 59 IN | DIASTOLIC BLOOD PRESSURE: 75 MMHG | BODY MASS INDEX: 29.23 KG/M2 | TEMPERATURE: 97.9 F | OXYGEN SATURATION: 97 % | HEART RATE: 73 BPM | WEIGHT: 145 LBS | SYSTOLIC BLOOD PRESSURE: 115 MMHG

## 2020-02-27 DIAGNOSIS — Z90.710 H/O ABDOMINAL HYSTERECTOMY: ICD-10-CM

## 2020-02-27 DIAGNOSIS — Z79.899 MEDICATION MANAGEMENT: ICD-10-CM

## 2020-02-27 DIAGNOSIS — N32.81 OVERACTIVE BLADDER: ICD-10-CM

## 2020-02-27 DIAGNOSIS — Z71.89 ADVANCED DIRECTIVES, COUNSELING/DISCUSSION: ICD-10-CM

## 2020-02-27 DIAGNOSIS — Z00.00 ROUTINE GENERAL MEDICAL EXAMINATION AT A HEALTH CARE FACILITY: Primary | ICD-10-CM

## 2020-02-27 DIAGNOSIS — Z13.29 SCREENING FOR THYROID DISORDER: ICD-10-CM

## 2020-02-27 DIAGNOSIS — R10.13 DYSPEPSIA: ICD-10-CM

## 2020-02-27 DIAGNOSIS — M25.562 ACUTE PAIN OF LEFT KNEE: ICD-10-CM

## 2020-02-27 DIAGNOSIS — M25.862 CYST OF KNEE JOINT, LEFT: ICD-10-CM

## 2020-02-27 DIAGNOSIS — R35.0 URINARY FREQUENCY: ICD-10-CM

## 2020-02-27 DIAGNOSIS — Z90.49 S/P LAPAROSCOPIC CHOLECYSTECTOMY: ICD-10-CM

## 2020-02-27 DIAGNOSIS — E78.5 HYPERLIPIDEMIA, UNSPECIFIED HYPERLIPIDEMIA TYPE: ICD-10-CM

## 2020-02-27 DIAGNOSIS — Z11.4 SCREENING FOR HIV (HUMAN IMMUNODEFICIENCY VIRUS): ICD-10-CM

## 2020-02-27 LAB
ALBUMIN SERPL-MCNC: 4 G/DL (ref 3.4–5)
ALBUMIN UR-MCNC: NEGATIVE MG/DL
ALP SERPL-CCNC: 83 U/L (ref 40–150)
ALT SERPL W P-5'-P-CCNC: 42 U/L (ref 0–50)
ANION GAP SERPL CALCULATED.3IONS-SCNC: 10 MMOL/L (ref 3–14)
APPEARANCE UR: CLEAR
AST SERPL W P-5'-P-CCNC: 16 U/L (ref 0–45)
BACTERIA #/AREA URNS HPF: ABNORMAL /HPF
BILIRUB SERPL-MCNC: 0.6 MG/DL (ref 0.2–1.3)
BILIRUB UR QL STRIP: NEGATIVE
BUN SERPL-MCNC: 14 MG/DL (ref 7–30)
CALCIUM SERPL-MCNC: 8.8 MG/DL (ref 8.5–10.1)
CHLORIDE SERPL-SCNC: 108 MMOL/L (ref 94–109)
CHOLEST SERPL-MCNC: 186 MG/DL
CO2 SERPL-SCNC: 22 MMOL/L (ref 20–32)
COLOR UR AUTO: YELLOW
CREAT SERPL-MCNC: 0.5 MG/DL (ref 0.52–1.04)
GFR SERPL CREATININE-BSD FRML MDRD: >90 ML/MIN/{1.73_M2}
GLUCOSE SERPL-MCNC: 110 MG/DL (ref 70–99)
GLUCOSE UR STRIP-MCNC: NEGATIVE MG/DL
HDLC SERPL-MCNC: 53 MG/DL
HGB UR QL STRIP: ABNORMAL
HIV 1+2 AB+HIV1 P24 AG SERPL QL IA: NONREACTIVE
KETONES UR STRIP-MCNC: NEGATIVE MG/DL
LDLC SERPL CALC-MCNC: 101 MG/DL
LEUKOCYTE ESTERASE UR QL STRIP: NEGATIVE
NITRATE UR QL: NEGATIVE
NON-SQ EPI CELLS #/AREA URNS LPF: ABNORMAL /LPF
NONHDLC SERPL-MCNC: 133 MG/DL
PH UR STRIP: 5.5 PH (ref 5–7)
POTASSIUM SERPL-SCNC: 3.6 MMOL/L (ref 3.4–5.3)
PROT SERPL-MCNC: 7.7 G/DL (ref 6.8–8.8)
RBC #/AREA URNS AUTO: ABNORMAL /HPF
SODIUM SERPL-SCNC: 140 MMOL/L (ref 133–144)
SOURCE: ABNORMAL
SP GR UR STRIP: >1.03 (ref 1–1.03)
TRIGL SERPL-MCNC: 160 MG/DL
TSH SERPL DL<=0.005 MIU/L-ACNC: 1.93 MU/L (ref 0.4–4)
UROBILINOGEN UR STRIP-ACNC: 0.2 EU/DL (ref 0.2–1)
WBC #/AREA URNS AUTO: ABNORMAL /HPF

## 2020-02-27 PROCEDURE — 99213 OFFICE O/P EST LOW 20 MIN: CPT | Mod: 25 | Performed by: INTERNAL MEDICINE

## 2020-02-27 PROCEDURE — 84443 ASSAY THYROID STIM HORMONE: CPT | Performed by: INTERNAL MEDICINE

## 2020-02-27 PROCEDURE — 80061 LIPID PANEL: CPT | Performed by: INTERNAL MEDICINE

## 2020-02-27 PROCEDURE — 99396 PREV VISIT EST AGE 40-64: CPT | Mod: 25 | Performed by: INTERNAL MEDICINE

## 2020-02-27 PROCEDURE — 90715 TDAP VACCINE 7 YRS/> IM: CPT | Performed by: INTERNAL MEDICINE

## 2020-02-27 PROCEDURE — 36415 COLL VENOUS BLD VENIPUNCTURE: CPT | Performed by: INTERNAL MEDICINE

## 2020-02-27 PROCEDURE — 81001 URINALYSIS AUTO W/SCOPE: CPT | Performed by: INTERNAL MEDICINE

## 2020-02-27 PROCEDURE — 90471 IMMUNIZATION ADMIN: CPT | Performed by: INTERNAL MEDICINE

## 2020-02-27 PROCEDURE — 87389 HIV-1 AG W/HIV-1&-2 AB AG IA: CPT | Performed by: INTERNAL MEDICINE

## 2020-02-27 PROCEDURE — 80053 COMPREHEN METABOLIC PANEL: CPT | Performed by: INTERNAL MEDICINE

## 2020-02-27 RX ORDER — MIRABEGRON 25 MG/1
25 TABLET, FILM COATED, EXTENDED RELEASE ORAL DAILY
Qty: 30 TABLET | Refills: 11 | Status: SHIPPED | OUTPATIENT
Start: 2020-02-27 | End: 2021-03-01

## 2020-02-27 RX ORDER — FAMOTIDINE 20 MG/1
20 TABLET, FILM COATED ORAL 2 TIMES DAILY PRN
Qty: 60 TABLET | Refills: 3 | Status: SHIPPED | OUTPATIENT
Start: 2020-02-27 | End: 2021-03-01

## 2020-02-27 ASSESSMENT — MIFFLIN-ST. JEOR: SCORE: 1142.96

## 2020-02-27 NOTE — LETTER
[facility]  [address] [place] [address] [address]  [address]  [place]  [address]  Tel: [phone]    March 2, 2020    Mariaelena Ok  [address] [facility] [address]          Bo Ferrell,    This is to inform you regarding your test result.    Your urine test is negative for infection but it shows presence of red blood cells which showed up in the past also  You had a CAT scan done in the past in 2017 which did not show the reason for blood in the urine  At this point I recommend that you should see a urologist for work-up of hematuria as you would need cystoscopy  Please call the following number to make the appointment to see a urologist  [facility]: [facility]  [place] [phone]   [email or web address]  Referral is placed  HIV 1&2 Antibody is negative.  TSH which is thyroid hormone is normal.  Your total cholesterol is normal.  The triglycerides are high. Lowering  the amount of sugar ,alcohol and sweets in the diet helps to control this.Exercise and weight loss helps.  Your LDL which is called bad cholesterol is elevated.  Eat low cholesterol low fat  diet and do regular physical activity. Avoid high sugar containing food.  If you would like to see a dietitian regarding this then please let us know so we can put a referral for you to see a dietitian.  Glucose which is your blood sugar is slightly elevated.  Your liver test and kidney function is fine      Sincerely,      Dr.Nasima Dejuan MD,FACP

## 2020-02-27 NOTE — PROGRESS NOTES
"   SUBJECTIVE:   CC: Mariaelena Euceda is an 59 year old woman who presents for preventive health visit.     Healthy Habits:    Do you get at least three servings of calcium containing foods daily (dairy, green leafy vegetables, etc.)? { :790451::\"yes\"}    Amount of exercise or daily activities, outside of work: { :641527}    Problems taking medications regularly { :044557::\"No\"}    Medication side effects: { :605918::\"No\"}    Have you had an eye exam in the past two years? { :233301}    Do you see a dentist twice per year? { :326494}    Do you have sleep apnea, excessive snoring or daytime drowsiness?{ :630874}  {Outside tests to abstract? :139954}    {additional problems to add (Optional):452734}    Today's PHQ-2 Score:   PHQ-2 ( 1999 Pfizer) 10/18/2019 12/21/2018   Q1: Little interest or pleasure in doing things 0 0   Q2: Feeling down, depressed or hopeless 0 0   PHQ-2 Score 0 0     {PHQ-2 LOOK IN ASSESSMENTS (Optional) :911054}  Abuse: Current or Past(Physical, Sexual or Emotional)- {YES/NO/NA:179198}  Do you feel safe in your environment? {YES/NO/NA:218877}    Have you ever done Advance Care Planning? (For example, a Health Directive, POLST, or a discussion with a medical provider or your loved ones about your wishes): { :406409}    Social History     Tobacco Use     Smoking status: Never Smoker     Smokeless tobacco: Never Used   Substance Use Topics     Alcohol use: No     Alcohol/week: 0.0 standard drinks     If you drink alcohol do you typically have >3 drinks per day or >7 drinks per week? {ETOH :691254}                     Reviewed orders with patient.  Reviewed health maintenance and updated orders accordingly - {Yes/No:336691::\"Yes\"}  {Chronicprobdata (Optional):543285}    {Mammo Decision Support (Optional):685781}    Pertinent mammograms are reviewed under the imaging tab.  History of abnormal Pap smear: {PAP HX:074930}  PAP / HPV 4/6/2012 5/13/2008 1/5/2007   PAP NIL NIL NIL     Reviewed and updated as " "needed this visit by clinical staff  Tobacco  Allergies  Meds         Reviewed and updated as needed this visit by Provider        {HISTORY OPTIONS (Optional):225808}    ROS:  { :256792}    OBJECTIVE:   /75   Pulse 73   Temp 97.9  F (36.6  C) (Oral)   Ht 1.49 m (4' 10.66\")   Wt 65.8 kg (145 lb)   LMP 12/02/2010   SpO2 97%   BMI 29.63 kg/m    EXAM:  {Exam Choices:047206}    {Diagnostic Test Results (Optional):046274::\"Diagnostic Test Results:\",\"Labs reviewed in Epic\"}    ASSESSMENT/PLAN:   {Diag Picklist:040361}    COUNSELING:   {FEMALE COUNSELING MESSAGES:447900::\"Reviewed preventive health counseling, as reflected in patient instructions\"}    Estimated body mass index is 29.63 kg/m  as calculated from the following:    Height as of this encounter: 1.49 m (4' 10.66\").    Weight as of this encounter: 65.8 kg (145 lb).    {Weight Management Plan (ACO) Complete if BMI is abnormal-  Ages 18-64  BMI >24.9.  Age 65+ with BMI <23 or >30 (Optional):376383}     reports that she has never smoked. She has never used smokeless tobacco.  {Tobacco Cessation -- Complete if patient is a smoker (Optional):835048}    Counseling Resources:  ATP IV Guidelines  Pooled Cohorts Equation Calculator  Breast Cancer Risk Calculator  FRAX Risk Assessment  ICSI Preventive Guidelines  Dietary Guidelines for Americans, 2010  USDA's MyPlate  ASA Prophylaxis  Lung CA Screening    Janell Zaidi MD  [facility]  "

## 2020-02-27 NOTE — PROGRESS NOTES
SUBJECTIVE:   CC: Mariaelena Euceda is an 59 year old woman who presents for preventive health visit.     History obtained with help of interpretor.    Healthy Habits:    Getting at least 3 servings of Calcium per day:  Yes    Bi-annual eye exam:  NO    Dental care twice a year:  Yes    Sleep apnea or symptoms of sleep apnea:  None    Diet:  Regular (no restrictions)    Frequency of exercise:  2-3 days/week    Duration of exercise:: walking alot at work.    Taking medications regularly:  Not Applicable    Barriers to taking medications:  Not applicable    Medication side effects:  Not applicable    PHQ-2 Total Score:    Additional concerns today:  Yes (Knee issue)    Today's PHQ-2 Score:   PHQ-2 ( 1999 Pfizer) 10/18/2019   Q1: Little interest or pleasure in doing things 0   Q2: Feeling down, depressed or hopeless 0   PHQ-2 Score 0     Abuse: Current or Past(Physical, Sexual or Emotional)- No  Do you feel safe in your environment? Yes    Have you ever done Advance Care Planning? (For example, a Health Directive, POLST, or a discussion with a medical provider or your loved ones about your wishes): No, advance care planning information given to patient to review.  Advanced care planning was discussed at today's visit.    Social History     Tobacco Use     Smoking status: Never Smoker     Smokeless tobacco: Never Used   Substance Use Topics     Alcohol use: No     Alcohol/week: 0.0 standard drinks     If you drink alcohol do you typically have >3 drinks per day or >7 drinks per week? No    No flowsheet data found.    Reviewed orders with patient.  Reviewed health maintenance and updated orders accordingly - Yes  Labs reviewed in EPIC  Patient Active Problem List   Diagnosis     Lumbar pain     CARDIOVASCULAR SCREENING; LDL GOAL LESS THAN 160     Family history of diabetes mellitus     S/P laparoscopic cholecystectomy     Hematuria     Hyperlipidemia, unspecified hyperlipidemia type     H/O abdominal hysterectomy      Past Surgical History:   Procedure Laterality Date     BIOPSY OF BREAST, INCISIONAL  2003    left breast, benign     HYSTERECTOMY, PAP NO LONGER INDICATED       LAPAROSCOPIC CHOLECYSTECTOMY WITH CHOLANGIOGRAMS N/A 1/8/2017    Procedure: LAPAROSCOPIC CHOLECYSTECTOMY WITH CHOLANGIOGRAMS;  Surgeon: James Umana MD;  Location: [facility] OR     SALPINGO OOPHORECTOMY,R/L/RANDELL       TUBAL LIGATION  1998       Social History     Tobacco Use     Smoking status: Never Smoker     Smokeless tobacco: Never Used   Substance Use Topics     Alcohol use: No     Alcohol/week: 0.0 standard drinks     Family History   Problem Relation Age of Onset     Diabetes Mother      Prostate Cancer Father      Diabetes Father      Diabetes Brother      Diabetes Sister      Breast Cancer No family hx of      Cancer - colorectal No family hx of          Current Outpatient Medications   Medication Sig Dispense Refill     famotidine (PEPCID) 20 MG tablet Take 1 tablet (20 mg) by mouth 2 times daily as needed 60 tablet 3     mirabegron (MYRBETRIQ) 25 MG 24 hr tablet Take 1 tablet (25 mg) by mouth daily 30 tablet 11     No Known Allergies    Mammogram Screening: Patient over age 50, mutual decision to screen reflected in health maintenance.    Pertinent mammograms are reviewed under the imaging tab.  History of abnormal Pap smear: Status post benign hysterectomy. Health Maintenance and Surgical History updated.  PAP / HPV 4/6/2012 5/13/2008 1/5/2007   PAP NIL NIL NIL     Reviewed and updated as needed this visit by clinical staff  Tobacco  Allergies  Meds         Reviewed and updated as needed this visit by Provider        Review of Systems  CONSTITUTIONAL: NEGATIVE for fever, chills, change in weight  INTEGUMENTARY/SKIN: NEGATIVE for worrisome rashes, moles or lesions  EYES: NEGATIVE for vision changes or irritation  ENT: NEGATIVE for ear, mouth and throat problems  RESP: NEGATIVE for significant cough or SOB  BREAST: NEGATIVE for masses,  "tenderness or discharge  CV: NEGATIVE for chest pain, palpitations or peripheral edema  GI: POSITIVE for irritable bowels, acid reflux, NEGATIVE for nausea, abdominal pain  : NEGATIVE for unusual urinary or vaginal symptoms. No vaginal bleeding.  MUSCULOSKELETAL: POSITIVE for acute left knee pain, NEGATIVE for significant myalgia  NEURO: NEGATIVE for weakness, dizziness or paresthesias  PSYCHIATRIC: NEGATIVE for changes in mood or affect      This document serves as a record of the services and decisions personally performed and made by Janell Zaidi MD. It was created on her behalf by Aishwarya Haynes, a trained medical scribe. The creation of this document is based on the provider's statements to the medical scribe.  Aishwarya Haynes 9:19 AM February 27, 2020    OBJECTIVE:   /75   Pulse 73   Temp 97.9  F (36.6  C) (Oral)   Ht 1.49 m (4' 10.66\")   Wt 65.8 kg (145 lb)   LMP 12/02/2010   SpO2 97%   BMI 29.63 kg/m    Physical Exam  GENERAL APPEARANCE: overweight, alert and no distress  EYES: Eyes grossly normal to inspection, PERRL and conjunctivae and sclerae normal  HENT: ear canals and TM's normal, nose and mouth without ulcers or lesions, oropharynx clear and oral mucous membranes moist  NECK: no adenopathy, no asymmetry, masses, or scars and thyroid normal to palpation  RESP: lungs clear to auscultation - no rales, rhonchi or wheezes  BREAST: normal without masses, tenderness or nipple discharge and no palpable axillary masses or adenopathy  CV: regular rate and rhythm, normal S1 S2, no S3 or S4, no murmur, click or rub, no peripheral edema and peripheral pulses strong  ABDOMEN: soft, nontender, no hepatosplenomegaly, no masses and bowel sounds normal  MS: no musculoskeletal defects are noted and gait is age appropriate without ataxia  She has cystic swelling lateral to left knee 3-4 cm no redness or warmeth.  SKIN: no suspicious lesions or rashes  NEURO: Normal strength and tone, sensory exam grossly normal, " mentation intact and speech normal  PSYCH: mentation appears normal and affect normal/bright    Diagnostic Test Results:  Labs reviewed in Epic  Results for orders placed or performed in visit on 02/27/20 (from the past 24 hour(s))   TSH with free T4 reflex   Result Value Ref Range    TSH 1.93 0.40 - 4.00 mU/L   Lipid panel reflex to direct LDL Fasting   Result Value Ref Range    Cholesterol 186 <200 mg/dL    Triglycerides 160 (H) <150 mg/dL    HDL Cholesterol 53 >49 mg/dL    LDL Cholesterol Calculated 101 (H) <100 mg/dL    Non HDL Cholesterol 133 (H) <130 mg/dL   Comprehensive metabolic panel   Result Value Ref Range    Sodium 140 133 - 144 mmol/L    Potassium 3.6 3.4 - 5.3 mmol/L    Chloride 108 94 - 109 mmol/L    Carbon Dioxide 22 20 - 32 mmol/L    Anion Gap 10 3 - 14 mmol/L    Glucose 110 (H) 70 - 99 mg/dL    Urea Nitrogen 14 7 - 30 mg/dL    Creatinine 0.50 (L) 0.52 - 1.04 mg/dL    GFR Estimate >90 >60 mL/min/[1.73_m2]    GFR Estimate If Black >90 >60 mL/min/[1.73_m2]    Calcium 8.8 8.5 - 10.1 mg/dL    Bilirubin Total 0.6 0.2 - 1.3 mg/dL    Albumin 4.0 3.4 - 5.0 g/dL    Protein Total 7.7 6.8 - 8.8 g/dL    Alkaline Phosphatase 83 40 - 150 U/L    ALT 42 0 - 50 U/L    AST 16 0 - 45 U/L   HIV Antigen Antibody Combo   Result Value Ref Range    HIV Antigen Antibody Combo Nonreactive NR^Nonreactive       UA with Microscopic reflex to Culture   Result Value Ref Range    Color Urine Yellow     Appearance Urine Clear     Glucose Urine Negative NEG^Negative mg/dL    Bilirubin Urine Negative NEG^Negative    Ketones Urine Negative NEG^Negative mg/dL    Specific Gravity Urine >1.030 1.003 - 1.035    pH Urine 5.5 5.0 - 7.0 pH    Protein Albumin Urine Negative NEG^Negative mg/dL    Urobilinogen Urine 0.2 0.2 - 1.0 EU/dL    Nitrite Urine Negative NEG^Negative    Blood Urine Moderate (A) NEG^Negative    Leukocyte Esterase Urine Negative NEG^Negative    Source Midstream Urine     WBC Urine 0 - 5 OTO5^0 - 5 /HPF    RBC Urine 5-10  (A) OTO2^O - 2 /HPF    Squamous Epithelial /LPF Urine Few FEW^Few /LPF    Bacteria Urine Few (A) NEG^Negative /HPF     ASSESSMENT/PLAN:   Mariaelena was seen today for physical.    Diagnoses and all orders for this visit:    Routine general medical examination at a health care facility  Patient is accompanied by Maori speaking   Patient is here for wellness visit  Up to date on mammogram  Up to date on colonoscopy  Last colonoscopy was in 12/2019  She does not remember where she had this done  Per patient they found some polyps but was overall normal  Per patient she needs repeat in 1 year  She has report at home  She will let me know where she had colonoscopy done and send me report  Due for flu, shingles and tetanus vaccine  Educated patient about new shingles vaccine  Tetanus shot administered today  Patient declined flu shot today    Screening for thyroid disorder  -     TSH with free T4 reflex    Screening for HIV (human immunodeficiency virus)  Educated patient that CDC is recommending a one-time HIV screen  -     HIV Antigen Antibody Combo    S/P laparoscopic cholecystectomy  S/p cholecystectomy on 01/08/2017    Hyperlipidemia, unspecified hyperlipidemia type  -     Lipid panel reflex to direct LDL Fasting    Medication management  -     Comprehensive metabolic panel    Advanced directives, counseling/discussion  Does not have advanced directives  Information provided for patient today    H/O abdominal hysterectomy  Pap  is not needed.    Acute pain of left knee  Patient complains of left knee inflammation  This started 1 week ago after she twisted her foot  Area is only slightly painful with palpation  Inflammation is most bothersome for her  Walking makes inflammation worse  Has tried ibuprofen and icing for pain  Has not seen orthopedics yet  On exam her knee has fluid collection  Will have her see orthopedics to have fluid removed and possible cortisone injection  Referral for orthopedics  "placed  She will schedule  Take ibuprofen with food for pain  -     Orthopedic & Spine  Referral; Future    Cyst of knee joint, left  See above  -     Orthopedic & Spine  Referral; Future    Dyspepsia and IBS  After eating she needs to go to bathroom right away for BM  Stools are normal  She has issues with reflux and belching as well  This has been an issue since her gall bladder surgery  Does not take imodium  Explained she has IBS also  Explained bowel issues are common for patients who had cholecystectomy  Take 20mg pepcid twice daily as needed for GERD and belching.  -     famotidine (PEPCID) 20 MG tablet; Take 1 tablet (20 mg) by mouth 2 times daily as needed    Overactive bladder  Patient notes she gets urinary urgency  She has to go to bathroom right away otherwise she will have accident  Wondering who she should see for this  Explained she has overactive bladder  Explained there is medication available for this  Information about incontinence provided in office today  Start 25mg myrbetriq once daily  Do Kegel exercises at home  If symptoms do not improve will have her see specialist  -     mirabegron (MYRBETRIQ) 25 MG 24 hr tablet; Take 1 tablet (25 mg) by mouth daily    Urinary frequency  See above  -     UA with Microscopic reflex to Culture    COUNSELING:  Reviewed preventive health counseling, as reflected in patient instructions       Regular exercise       Healthy diet/nutrition       Immunizations    Vaccinated for: tetanus and Declined: Influenza due to Other not interested           Colon cancer screening       HIV screeninx in teen years, 1x in adult years, and at intervals if high risk       Advance Care Planning    Estimated body mass index is 29.63 kg/m  as calculated from the following:    Height as of this encounter: 1.49 m (4' 10.66\").    Weight as of this encounter: 65.8 kg (145 lb).    Weight management plan: Discussed healthy diet and exercise guidelines     reports " that she has never smoked. She has never used smokeless tobacco.    Patient Instructions   Labs today  Let me know where you last had your colonoscopy done and send me report    You will be due for mammogram after 04/08/2020  Mammogram is done in [address]  Please call the following number to make appointment at your earliest convenience:  [phone]    There is this is a new shingles vaccine available called shingrex  It is a series of 2 shots 2-6 months apart.  Considered more than 90% effective.  Please go to any pharmacy to get the  vaccine    Make appointment with orthopedics  Take ibuprofen with food     Follow-up in one year  Seek sooner medical attention if there is any worsening of symptoms or problems    Counseling Resources:  ATP IV Guidelines  Pooled Cohorts Equation Calculator  Breast Cancer Risk Calculator  FRAX Risk Assessment  ICSI Preventive Guidelines  Dietary Guidelines for Americans, 2010  USDA's MyPlate  ASA Prophylaxis  Lung CA Screening    The information in this document, created by the medical scribe for me, accurately reflects the services I personally performed and the decisions made by me. I have reviewed and approved this document for accuracy prior to leaving the patient care area.  February 27, 2020 9:48 AM    Janell Zaidi MD  [facility]

## 2020-02-27 NOTE — PATIENT INSTRUCTIONS
Labs today  Let me know where you last had your colonoscopy done and send me report    You will be due for mammogram after 04/08/2020  Mammogram is done in [address]  Please call the following number to make appointment at your earliest convenience:  [phone]    There is this is a new shingles vaccine available called shingrex  It is a series of 2 shots 2-6 months apart.  Considered more than 90% effective.  Please go to any pharmacy to get the  vaccine    Make appointment with orthopedics  Take ibuprofen with food     Follow-up in one year  Seek sooner medical attention if there is any worsening of symptoms or problems    Preventive Health Recommendations  Female Ages 50 - 64    Yearly exam: See your health care provider every year in order to  o Review health changes.   o Discuss preventive care.    o Review your medicines if your doctor has prescribed any.      Get a Pap test every three years (unless you have an abnormal result and your provider advises testing more often).    If you get Pap tests with HPV test, you only need to test every 5 years, unless you have an abnormal result.     You do not need a Pap test if your uterus was removed (hysterectomy) and you have not had cancer.    You should be tested each year for STDs (sexually transmitted diseases) if you're at risk.     Have a mammogram every 1 to 2 years.    Have a colonoscopy at age 50, or have a yearly FIT test (stool test). These exams screen for colon cancer.      Have a cholesterol test every 5 years, or more often if advised.    Have a diabetes test (fasting glucose) every three years. If you are at risk for diabetes, you should have this test more often.     If you are at risk for osteoporosis (brittle bone disease), think about having a bone density scan (DEXA).    Shots: Get a flu shot each year. Get a tetanus shot every 10 years.    Nutrition:     Eat at least 5 servings of fruits and vegetables each day.    Eat whole-grain bread,  whole-wheat pasta and brown rice instead of white grains and rice.    Get adequate Calcium and Vitamin D.     Lifestyle    Exercise at least 150 minutes a week (30 minutes a day, 5 days a week). This will help you control your weight and prevent disease.    Limit alcohol to one drink per day.    No smoking.     Wear sunscreen to prevent skin cancer.     See your dentist every six months for an exam and cleaning.    See your eye doctor every 1 to 2 years.    Patient Education     Treating Incontinence in Women: Nonsurgical Methods    The best treatment for you will depend on the type of incontinence you have. Your symptoms, age, and any underlying problems that are found also affect your treatment. While some types of incontinence may eventually require surgery, nonsurgical treatments may be effective in many cases. Nonsurgical treatments include lifestyle changes, muscle-strengthening exercises, and medicines.  Nonsurgical Treatments  Treatment for stress urinary incontinence includes:    Bladder training    Lifestyle changes such as weight loss and increased activity if incontinence is due to being overweight    Medicines, if bladder training has not helped    Pelvic floor muscle exercises  Lifestyle changes    Losing weight. Excess weight puts extra pressure on the pelvic floor muscles. Exercising and eating right can help you lose weight. This helps other treatments work better.    Making certain diet changes. Some foods may make you need to urinate more, so it may be good to avoid them. These include caffeinated drinks and alcohol. Ask your healthcare provider whether these or other diet changes might be helpful.    Quitting smoking. Smoking can lead to a chronic cough that strains pelvic floor muscles. Smoking may also damage the bladder and urethra.  Pelvic floor muscle exercises  There are exercises you can do to help strengthen your pelvic floor muscles. The pelvic floor muscles act as a sling to help hold  the bladder and urethra in place. These muscles also help keep the urethra closed. Weak pelvic floor muscles may allow urine to leak. To strengthen the pelvic floor muscles, do the exercises daily. In a few months, the muscles will be stronger and tighter. This can help prevent urine leakage.  Date Last Reviewed: 1/1/2017 2000-2019 [employer]. [address] [place], [place] [id number]. All rights reserved. This information is not intended as a substitute for professional medical care. Always follow your healthcare professional's instructions.

## 2020-02-28 ENCOUNTER — APPOINTMENT (OUTPATIENT)
Dept: INTERPRETER SERVICES | Facility: CLINIC | Age: 60
End: 2020-02-28
Payer: COMMERCIAL

## 2020-03-02 ENCOUNTER — TELEPHONE (OUTPATIENT)
Dept: FAMILY MEDICINE | Facility: CLINIC | Age: 60
End: 2020-03-02

## 2020-03-02 DIAGNOSIS — R31.29 MICROSCOPIC HEMATURIA: Primary | ICD-10-CM

## 2020-03-02 NOTE — RESULT ENCOUNTER NOTE
Please call this patient as she is a Gambian-speaking  See my detailed result note to notify her about referral to urology

## 2020-03-02 NOTE — TELEPHONE ENCOUNTER
No ans home ph via  services.  Left message to call clinic for Dr. Zaidi's  Lab result note.     I did mail letter.  I did not try the work ph at this point.     Notes recorded by Janell Zaidi MD on 3/2/2020 at 9:14 AM CST    Please call this patient as she is a Thai-speaking  See my detailed result note to notify her about referral to urology    Bo Ferrell,    This is to inform you regarding your test result.    Your urine test is negative for infection but it shows presence of red blood cells which showed up in the past also  You had a CAT scan done in the past in 2017 which did not show the reason for blood in the urine  At this point I recommend that you should see a urologist for work-up of hematuria as you would need cystoscopy  Please call the following number to make the appointment to see a urologist  [facility]: [facility] [phone]   [email or web address]  Referral is placed  HIV 1&2 Antibody is negative.  TSH which is thyroid hormone is normal.  Your total cholesterol is normal.  The triglycerides are high. Lowering  the amount of sugar ,alcohol and sweets in the diet helps to control this.Exercise and weight loss helps.  Your LDL which is called bad cholesterol is elevated.  Eat low cholesterol low fat  diet and do regular physical activity. Avoid high sugar containing food.  If you would like to see a dietitian regarding this then please let us know so we can put a referral for you to see a dietitian.  Glucose which is your blood sugar is slightly elevated.  Your liver test and kidney function is fine

## 2020-03-02 NOTE — RESULT ENCOUNTER NOTE
Please notify patient by sending following letter with copy of test results      Bo Ferrell,    This is to inform you regarding your test result.    Your urine test is negative for infection but it shows presence of red blood cells which showed up in the past also  You had a CAT scan done in the past in 2017 which did not show the reason for blood in the urine  At this point I recommend that you should see a urologist for work-up of hematuria as you would need cystoscopy  Please call the following number to make the appointment to see a urologist  [facility]: [facility] [phone]   [email or web address]  Referral is placed  HIV 1&2 Antibody is negative.  TSH which is thyroid hormone is normal.  Your total cholesterol is normal.  The triglycerides are high. Lowering  the amount of sugar ,alcohol and sweets in the diet helps to control this.Exercise and weight loss helps.  Your LDL which is called bad cholesterol is elevated.  Eat low cholesterol low fat  diet and do regular physical activity. Avoid high sugar containing food.  If you would like to see a dietitian regarding this then please let us know so we can put a referral for you to see a dietitian.  Glucose which is your blood sugar is slightly elevated.  Your liver test and kidney function is fine      Sincerely,      Dr.Nasima Dejuan MD,FACP

## 2020-03-05 NOTE — TELEPHONE ENCOUNTER
Called and talked to patient in Amharic, read message below to patient and provided phone number for Urologist per Dr. Zaidi.    Padmini Silvestre MA on 3/5/2020 at 10:43 AM

## 2020-05-07 ENCOUNTER — TRANSFERRED RECORDS (OUTPATIENT)
Dept: HEALTH INFORMATION MANAGEMENT | Facility: CLINIC | Age: 60
End: 2020-05-07

## 2020-12-14 ENCOUNTER — TELEPHONE (OUTPATIENT)
Dept: FAMILY MEDICINE | Facility: CLINIC | Age: 60
End: 2020-12-14

## 2020-12-14 NOTE — TELEPHONE ENCOUNTER
Left voice message asking pt to call triage back. [employer] form was faxed to doctor Zaidi. Pt has not been seen at clinic since 02/27/2020. Has she been seen at a different clinic? If yes, please fax form to patients new clinic. Otherwise, pt would need to have an appointment before form can be completed.     Form is at [facility] 5 th floor under RN files.

## 2020-12-16 NOTE — TELEPHONE ENCOUNTER
Attempt #2: Called patient, no answer. LVM asking her to call back     If patient calls back: needs appointment with PCP for forms to be discussed/completed     Lea RIOS RN

## 2020-12-29 ENCOUNTER — TRANSFERRED RECORDS (OUTPATIENT)
Dept: MULTI SPECIALTY CLINIC | Facility: CLINIC | Age: 60
End: 2020-12-29

## 2020-12-29 ENCOUNTER — TRANSFERRED RECORDS (OUTPATIENT)
Dept: HEALTH INFORMATION MANAGEMENT | Facility: CLINIC | Age: 60
End: 2020-12-29

## 2021-03-01 ENCOUNTER — OFFICE VISIT (OUTPATIENT)
Dept: FAMILY MEDICINE | Facility: CLINIC | Age: 61
End: 2021-03-01
Payer: COMMERCIAL

## 2021-03-01 ENCOUNTER — HOSPITAL ENCOUNTER (OUTPATIENT)
Dept: MAMMOGRAPHY | Facility: CLINIC | Age: 61
Discharge: HOME | End: 2021-03-01
Attending: INTERNAL MEDICINE | Admitting: INTERNAL MEDICINE
Payer: COMMERCIAL

## 2021-03-01 VITALS
OXYGEN SATURATION: 97 % | TEMPERATURE: 97.7 F | DIASTOLIC BLOOD PRESSURE: 76 MMHG | HEART RATE: 75 BPM | WEIGHT: 149 LBS | BODY MASS INDEX: 30.04 KG/M2 | SYSTOLIC BLOOD PRESSURE: 125 MMHG | HEIGHT: 59 IN

## 2021-03-01 DIAGNOSIS — R31.29 MICROSCOPIC HEMATURIA: Primary | ICD-10-CM

## 2021-03-01 DIAGNOSIS — Z12.31 SCREENING MAMMOGRAM FOR HIGH-RISK PATIENT: ICD-10-CM

## 2021-03-01 DIAGNOSIS — Z90.710 H/O: HYSTERECTOMY: ICD-10-CM

## 2021-03-01 DIAGNOSIS — Z86.16 HISTORY OF 2019 NOVEL CORONAVIRUS DISEASE (COVID-19): ICD-10-CM

## 2021-03-01 DIAGNOSIS — N32.81 OVERACTIVE BLADDER: ICD-10-CM

## 2021-03-01 DIAGNOSIS — L72.3 SEBACEOUS CYST: ICD-10-CM

## 2021-03-01 DIAGNOSIS — Z13.0 SCREENING FOR DEFICIENCY ANEMIA: ICD-10-CM

## 2021-03-01 DIAGNOSIS — Z00.00 ROUTINE GENERAL MEDICAL EXAMINATION AT A HEALTH CARE FACILITY: Primary | ICD-10-CM

## 2021-03-01 DIAGNOSIS — E78.5 HYPERLIPIDEMIA, UNSPECIFIED HYPERLIPIDEMIA TYPE: ICD-10-CM

## 2021-03-01 DIAGNOSIS — Z79.899 MEDICATION MANAGEMENT: ICD-10-CM

## 2021-03-01 DIAGNOSIS — R39.15 URGENCY OF URINATION: ICD-10-CM

## 2021-03-01 DIAGNOSIS — Z13.29 SCREENING FOR THYROID DISORDER: ICD-10-CM

## 2021-03-01 DIAGNOSIS — R10.13 DYSPEPSIA: ICD-10-CM

## 2021-03-01 LAB
ALBUMIN SERPL-MCNC: 3.9 G/DL (ref 3.4–5)
ALBUMIN UR-MCNC: NEGATIVE MG/DL
ALP SERPL-CCNC: 72 U/L (ref 40–150)
ALT SERPL W P-5'-P-CCNC: 36 U/L (ref 0–50)
ANION GAP SERPL CALCULATED.3IONS-SCNC: 6 MMOL/L (ref 3–14)
APPEARANCE UR: CLEAR
AST SERPL W P-5'-P-CCNC: 15 U/L (ref 0–45)
BACTERIA #/AREA URNS HPF: ABNORMAL /HPF
BILIRUB SERPL-MCNC: 0.4 MG/DL (ref 0.2–1.3)
BILIRUB UR QL STRIP: NEGATIVE
BUN SERPL-MCNC: 14 MG/DL (ref 7–30)
CALCIUM SERPL-MCNC: 9 MG/DL (ref 8.5–10.1)
CHLORIDE SERPL-SCNC: 109 MMOL/L (ref 94–109)
CHOLEST SERPL-MCNC: 173 MG/DL
CO2 SERPL-SCNC: 24 MMOL/L (ref 20–32)
COLOR UR AUTO: YELLOW
CREAT SERPL-MCNC: 0.56 MG/DL (ref 0.52–1.04)
ERYTHROCYTE [DISTWIDTH] IN BLOOD BY AUTOMATED COUNT: 12.5 % (ref 10–15)
GFR SERPL CREATININE-BSD FRML MDRD: >90 ML/MIN/{1.73_M2}
GLUCOSE SERPL-MCNC: 105 MG/DL (ref 70–99)
GLUCOSE UR STRIP-MCNC: NEGATIVE MG/DL
HCT VFR BLD AUTO: 40.4 % (ref 35–47)
HDLC SERPL-MCNC: 44 MG/DL
HGB BLD-MCNC: 13.7 G/DL (ref 11.7–15.7)
HGB UR QL STRIP: ABNORMAL
KETONES UR STRIP-MCNC: NEGATIVE MG/DL
LDLC SERPL CALC-MCNC: 70 MG/DL
LEUKOCYTE ESTERASE UR QL STRIP: ABNORMAL
MCH RBC QN AUTO: 30.7 PG (ref 26.5–33)
MCHC RBC AUTO-ENTMCNC: 33.9 G/DL (ref 31.5–36.5)
MCV RBC AUTO: 91 FL (ref 78–100)
NITRATE UR QL: NEGATIVE
NON-SQ EPI CELLS #/AREA URNS LPF: ABNORMAL /LPF
NONHDLC SERPL-MCNC: 129 MG/DL
PH UR STRIP: 5.5 PH (ref 5–7)
PLATELET # BLD AUTO: 318 10E9/L (ref 150–450)
POTASSIUM SERPL-SCNC: 3.8 MMOL/L (ref 3.4–5.3)
PROT SERPL-MCNC: 7.3 G/DL (ref 6.8–8.8)
RBC # BLD AUTO: 4.46 10E12/L (ref 3.8–5.2)
RBC #/AREA URNS AUTO: ABNORMAL /HPF
SODIUM SERPL-SCNC: 139 MMOL/L (ref 133–144)
SOURCE: ABNORMAL
SP GR UR STRIP: >1.03 (ref 1–1.03)
TRIGL SERPL-MCNC: 293 MG/DL
TSH SERPL DL<=0.005 MIU/L-ACNC: 1.23 MU/L (ref 0.4–4)
UROBILINOGEN UR STRIP-ACNC: 0.2 EU/DL (ref 0.2–1)
WBC # BLD AUTO: 8.1 10E9/L (ref 4–11)
WBC #/AREA URNS AUTO: ABNORMAL /HPF

## 2021-03-01 PROCEDURE — 99213 OFFICE O/P EST LOW 20 MIN: CPT | Mod: 25 | Performed by: INTERNAL MEDICINE

## 2021-03-01 PROCEDURE — 85027 COMPLETE CBC AUTOMATED: CPT | Performed by: INTERNAL MEDICINE

## 2021-03-01 PROCEDURE — 80053 COMPREHEN METABOLIC PANEL: CPT | Performed by: INTERNAL MEDICINE

## 2021-03-01 PROCEDURE — 99396 PREV VISIT EST AGE 40-64: CPT | Performed by: INTERNAL MEDICINE

## 2021-03-01 PROCEDURE — 36415 COLL VENOUS BLD VENIPUNCTURE: CPT | Performed by: INTERNAL MEDICINE

## 2021-03-01 PROCEDURE — 84443 ASSAY THYROID STIM HORMONE: CPT | Performed by: INTERNAL MEDICINE

## 2021-03-01 PROCEDURE — 77067 SCR MAMMO BI INCL CAD: CPT

## 2021-03-01 PROCEDURE — 81001 URINALYSIS AUTO W/SCOPE: CPT | Performed by: INTERNAL MEDICINE

## 2021-03-01 PROCEDURE — 80061 LIPID PANEL: CPT | Performed by: INTERNAL MEDICINE

## 2021-03-01 RX ORDER — MIRABEGRON 25 MG/1
25 TABLET, FILM COATED, EXTENDED RELEASE ORAL DAILY
Qty: 30 TABLET | Refills: 11 | Status: SHIPPED | OUTPATIENT
Start: 2021-03-01 | End: 2022-03-03

## 2021-03-01 RX ORDER — FAMOTIDINE 20 MG/1
20 TABLET, FILM COATED ORAL 2 TIMES DAILY PRN
Qty: 60 TABLET | Refills: 3 | Status: SHIPPED | OUTPATIENT
Start: 2021-03-01 | End: 2022-03-03

## 2021-03-01 ASSESSMENT — MIFFLIN-ST. JEOR: SCORE: 1156.09

## 2021-03-01 NOTE — Clinical Note
Patient reported that she had colonoscopy done approximately on December 1, 2019 by GI doctor in [place] results were normal per patient report

## 2021-03-01 NOTE — PROGRESS NOTES
SUBJECTIVE:   CC: Mariaelena Euceda is an 60 year old woman who presents for preventive health visit.       Patient has been advised of split billing requirements and indicates understanding: Yes  Healthy Habits:    Getting at least 3 servings of Calcium per day:  NO    Bi-annual eye exam:  NO    Dental care twice a year:  Yes    Sleep apnea or symptoms of sleep apnea:  None    Diet:  Regular (no restrictions)    Frequency of exercise:  2-3 days/week    Duration of exercise:  30-45 minutes    Taking medications regularly:  Not Applicable    Barriers to taking medications:  Not applicable    Medication side effects:  Not applicable    PHQ-2 Total Score:    Additional concerns today:  No    Per patient report she has colonoscopy done on 12/2019 at [facility]    Today's PHQ-2 Score:   PHQ-2 ( 1999 Pfizer) 3/1/2021   Q1: Little interest or pleasure in doing things 0   Q2: Feeling down, depressed or hopeless 0   PHQ-2 Score 0       Abuse: Current or Past (Physical, Sexual or Emotional) - No  Do you feel safe in your environment? Yes        Social History     Tobacco Use     Smoking status: Never Smoker     Smokeless tobacco: Never Used   Substance Use Topics     Alcohol use: No     Alcohol/week: 0.0 standard drinks           Any new diagnosis of family breast, ovarian, or bowel cancer? No     Reviewed orders with patient.  Reviewed health maintenance and updated orders accordingly - Yes  Lab work is in process        History of abnormal Pap smear:pt had hysterectomy    PAP / HPV 4/6/2012 5/13/2008 1/5/2007   PAP NIL NIL NIL     Reviewed and updated as needed this visit by clinical staff  Tobacco  Allergies  Meds              Reviewed and updated as needed this visit by Provider                    Review of Systems  CONSTITUTIONAL: NEGATIVE for fever, chills, change in weight  INTEGUMENTARY/SKIN: NEGATIVE for worrisome rashes, moles or lesions  EYES: NEGATIVE for vision changes or irritation  ENT: NEGATIVE for ear,  "mouth and throat problems  RESP: NEGATIVE for significant cough or SOB  BREAST: NEGATIVE for masses, tenderness or discharge  CV: NEGATIVE for chest pain, palpitations or peripheral edema  GI: NEGATIVE for nausea, abdominal pain, heartburn, or change in bowel habits  : NEGATIVE for unusual urinary or vaginal symptoms. No vaginal bleeding.  MUSCULOSKELETAL: NEGATIVE for significant arthralgias or myalgia  NEURO: NEGATIVE for weakness, dizziness or paresthesias  PSYCHIATRIC: NEGATIVE for changes in mood or affect      OBJECTIVE:   /76 (BP Location: Left arm, Patient Position: Sitting, Cuff Size: Adult Regular)   Pulse 75   Temp 97.7  F (36.5  C) (Temporal)   Ht 1.49 m (4' 10.66\")   Wt 67.6 kg (149 lb)   LMP 12/02/2010   SpO2 97%   BMI 30.44 kg/m    Physical Exam  GENERAL: healthy, alert and no distress  EYES: Eyes grossly normal to inspection, PERRL and conjunctivae and sclerae normal  HENT: ear canals and TM's normal, nose and mouth without ulcers or lesions  NECK: no adenopathy, no asymmetry, masses, or scars and thyroid normal to palpation  RESP: lungs clear to auscultation - no rales, rhonchi or wheezes  BREAST: normal without masses, tenderness or nipple discharge and no palpable axillary masses or adenopathy  CV: regular rate and rhythm, normal S1 S2, no S3 or S4, no murmur, click or rub, no peripheral edema and peripheral pulses strong  ABDOMEN: soft, nontender, no hepatosplenomegaly, no masses and bowel sounds normal  MS: no gross musculoskeletal defects noted, no edema  SKIN: no suspicious lesions or rashes  NEURO: Normal strength and tone, mentation intact and speech normal  PSYCH: mentation appears normal, affect normal/bright        ASSESSMENT/PLAN:   Mariaelena was seen today for physical and derm problem.    Diagnoses and all orders for this visit:    Routine general medical examination at a health care facility  Preventive health counseling was also done.  She was a scheduled for mammogram " today  Per patient she had colonoscopy in 2019  She does not know the name of the place but it was in [place]  Per patient report it was normal    Screening for thyroid disorder  -     TSH with free T4 reflex    Screening for deficiency anemia  -     CBC with platelets    Medication management  -     Comprehensive metabolic panel    Dyspepsia  -     famotidine (PEPCID) 20 MG tablet; Take 1 tablet (20 mg) by mouth 2 times daily as needed  Per patient this medication helps  If no improvement endoscopy can be considered  Or we can try Prilosec also    Overactive bladder  -     mirabegron (MYRBETRIQ) 25 MG 24 hr tablet; Take 1 tablet (25 mg) by mouth daily  This medication helps her with overactive bladder    Hyperlipidemia, unspecified hyperlipidemia type  -     Lipid panel reflex to direct LDL Fasting    H/O: hysterectomy  Comments:    Laparoscopic-assisted vaginal hysterectomy and left salpingo-oophorectomy  In 2011    Sebaceous cyst  Comments:  post neck causing discomfort and pt want to get it removed   Orders:  -     GENERAL SURG ADULT REFERRAL; Future  Is a small cyst on the posterior part of upper back and slightly below the neck  It causes discomfort and swells up intermittently  She would like to get that removed  Referral is done    Urgency of urination  -     UA with Microscopic reflex to Culture    History of 2019 novel coronavirus disease (COVID-19)    Colon polyps;  I was able to get the official report of her colonoscopy  It was done at [place] [facility]  She had one on 9 January 2020   They recommended repeating in 1 year  and she had second colonoscopy on December 29, 2020  They recommended repeating in 3 years  Copy sent for scanning      She has history of COVID-19 but recovered fully    Patient has been advised of split billing requirements and indicates understanding: Yes  COUNSELING:  Reviewed preventive health counseling, as reflected in patient instructions       Regular  "exercise       Healthy diet/nutrition    Estimated body mass index is 30.44 kg/m  as calculated from the following:    Height as of this encounter: 1.49 m (4' 10.66\").    Weight as of this encounter: 67.6 kg (149 lb).    Weight management plan: Discussed healthy diet and exercise guidelines    She reports that she has never smoked. She has never used smokeless tobacco.    Disclaimer: This note consists of symbols derived from keyboarding, dictation and/or voice recognition software. As a result, there may be errors in the script that have gone undetected. Please consider this when interpreting information found in this chart.    Counseling Resources:  ATP IV Guidelines  Pooled Cohorts Equation Calculator  Breast Cancer Risk Calculator  BRCA-Related Cancer Risk Assessment: FHS-7 Tool  FRAX Risk Assessment  ICSI Preventive Guidelines  Dietary Guidelines for Americans, 2010  USDA's MyPlate  ASA Prophylaxis  Lung CA Screening    Janell Zaidi MD  [facility]  "

## 2021-03-01 NOTE — PATIENT INSTRUCTIONS
Labs today  There is this is a new shingles vaccine available called shingrex  It is a series of 2 shots 2-6 months apart.  Considered more than 90% effective.  Please go to any pharmacy to get the  Vaccine  Make appointment with surgeon for getting cyst removed from back of your neck  Follow up in one year for physical   Seek sooner medical attention if there is any worsening of symptoms or problems.        Preventive Health Recommendations  Female Ages 50 - 64    Yearly exam: See your health care provider every year in order to  o Review health changes.   o Discuss preventive care.    o Review your medicines if your doctor has prescribed any.      Get a Pap test every three years (unless you have an abnormal result and your provider advises testing more often).    If you get Pap tests with HPV test, you only need to test every 5 years, unless you have an abnormal result.     You do not need a Pap test if your uterus was removed (hysterectomy) and you have not had cancer.    You should be tested each year for STDs (sexually transmitted diseases) if you're at risk.     Have a mammogram every 1 to 2 years.    Have a colonoscopy at age 50, or have a yearly FIT test (stool test). These exams screen for colon cancer.      Have a cholesterol test every 5 years, or more often if advised.    Have a diabetes test (fasting glucose) every three years. If you are at risk for diabetes, you should have this test more often.     If you are at risk for osteoporosis (brittle bone disease), think about having a bone density scan (DEXA).    Shots: Get a flu shot each year. Get a tetanus shot every 10 years.    Nutrition:     Eat at least 5 servings of fruits and vegetables each day.    Eat whole-grain bread, whole-wheat pasta and brown rice instead of white grains and rice.    Get adequate Calcium and Vitamin D.     Lifestyle    Exercise at least 150 minutes a week (30 minutes a day, 5 days a week). This will help you control your  weight and prevent disease.    Limit alcohol to one drink per day.    No smoking.     Wear sunscreen to prevent skin cancer.     See your dentist every six months for an exam and cleaning.    See your eye doctor every 1 to 2 years.

## 2021-03-01 NOTE — Clinical Note
Please get the colonoscopy report from [facility] in [place]  She had that done on December 2019

## 2021-03-01 NOTE — LETTER
March 2, 2021      Mariaelena Ok  [address] [facility] [address]        Monawendy Guerinela,     This is to inform you regarding your test result.     TSH which is thyroid hormone is normal.   The testing of your kidney function, liver function and electrolytes was satisfactory   Your total cholesterol is normal.   The triglycerides are high. Lowering  the amount of sugar ,alcohol and sweets in the diet helps to control this.Exercise and weight loss helps.   HDL which is called good cholesterol is low.   Your LDL cholesterol is normal.  This is often call bad cholesterol and high levels increase the risk for heart attacks and strokes.   Eat low cholesterol low fat  diet and do regular physical activity. Avoid high sugar containing food.   If you would like to see a dietitian regarding this then please let us know so we can put a referral for you to see a dietitian.   CBC result which includes white count Hemoglobin and  Platelet Counts is normal.   Your urine is negative for infection but positive for red blood cells   You had CT done in the past which was negative   I recommend that you see urologist for evaluation of red blood cells in urine .   Referral is placed    Please call the following number to make the appointment :   P: [facility] [phone]             Sincerely,       Dr.Nasima Dejuan MD,FACP           Resulted Orders   TSH with free T4 reflex   Result Value Ref Range    TSH 1.23 0.40 - 4.00 mU/L   Lipid panel reflex to direct LDL Fasting   Result Value Ref Range    Cholesterol 173 <200 mg/dL    Triglycerides 293 (H) <150 mg/dL      Comment:      Borderline high:  150-199 mg/dl  High:             200-499 mg/dl  Very high:       >499 mg/dl  Fasting specimen      HDL Cholesterol 44 (L) >49 mg/dL    LDL Cholesterol Calculated 70 <100 mg/dL      Comment:      Desirable:       <100 mg/dl    Non HDL Cholesterol 129 <130 mg/dL   Comprehensive metabolic panel   Result Value Ref  Range    Sodium 139 133 - 144 mmol/L    Potassium 3.8 3.4 - 5.3 mmol/L    Chloride 109 94 - 109 mmol/L    Carbon Dioxide 24 20 - 32 mmol/L    Anion Gap 6 3 - 14 mmol/L    Glucose 105 (H) 70 - 99 mg/dL      Comment:      Fasting specimen    Urea Nitrogen 14 7 - 30 mg/dL    Creatinine 0.56 0.52 - 1.04 mg/dL    GFR Estimate >90 >60 mL/min/[1.73_m2]      Comment:      Non  GFR Calc  Starting 12/18/2018, serum creatinine based estimated GFR (eGFR) will be   calculated using the Chronic Kidney Disease Epidemiology Collaboration   (CKD-EPI) equation.      GFR Estimate If Black >90 >60 mL/min/[1.73_m2]      Comment:       GFR Calc  Starting 12/18/2018, serum creatinine based estimated GFR (eGFR) will be   calculated using the Chronic Kidney Disease Epidemiology Collaboration   (CKD-EPI) equation.      Calcium 9.0 8.5 - 10.1 mg/dL    Bilirubin Total 0.4 0.2 - 1.3 mg/dL    Albumin 3.9 3.4 - 5.0 g/dL    Protein Total 7.3 6.8 - 8.8 g/dL    Alkaline Phosphatase 72 40 - 150 U/L    ALT 36 0 - 50 U/L    AST 15 0 - 45 U/L   CBC with platelets   Result Value Ref Range    WBC 8.1 4.0 - 11.0 10e9/L    RBC Count 4.46 3.8 - 5.2 10e12/L    Hemoglobin 13.7 11.7 - 15.7 g/dL    Hematocrit 40.4 35.0 - 47.0 %    MCV 91 78 - 100 fl    MCH 30.7 26.5 - 33.0 pg    MCHC 33.9 31.5 - 36.5 g/dL    RDW 12.5 10.0 - 15.0 %    Platelet Count 318 150 - 450 10e9/L   UA with Microscopic reflex to Culture   Result Value Ref Range    Color Urine Yellow     Appearance Urine Clear     Glucose Urine Negative NEG^Negative mg/dL    Bilirubin Urine Negative NEG^Negative    Ketones Urine Negative NEG^Negative mg/dL    Specific Gravity Urine >1.030 1.003 - 1.035    pH Urine 5.5 5.0 - 7.0 pH    Protein Albumin Urine Negative NEG^Negative mg/dL    Urobilinogen Urine 0.2 0.2 - 1.0 EU/dL    Nitrite Urine Negative NEG^Negative    Blood Urine Moderate (A) NEG^Negative    Leukocyte Esterase Urine Trace (A) NEG^Negative    Source Midstream  Urine     WBC Urine 0 - 5 OTO5^0 - 5 /HPF    RBC Urine 5-10 (A) OTO2^O - 2 /HPF    Squamous Epithelial /LPF Urine Few FEW^Few /LPF    Bacteria Urine Few (A) NEG^Negative /HPF

## 2021-03-01 NOTE — Clinical Note
I was able to get official report from [facility] [place]  She had colonoscopy done on January 2, 2020 and they recommended repeating in 1 year   She had another colonoscopy done on December 29, 2020 at [facility]  And repeat colonoscopy in 3 years was recommended by them

## 2021-03-02 ENCOUNTER — TELEPHONE (OUTPATIENT)
Dept: FAMILY MEDICINE | Facility: CLINIC | Age: 61
End: 2021-03-02

## 2021-03-02 NOTE — NURSING NOTE
Faxed received from [facility] regarding pt's colonoscopy report.  Placed on Dr. Zaidi's desk.    Inés Schmid MA

## 2021-03-02 NOTE — RESULT ENCOUNTER NOTE
Please notify patient by sending following letter with copy of test results      Bo Guerinela,    This is to inform you regarding your test result.    TSH which is thyroid hormone is normal.  The testing of your kidney function, liver function and electrolytes was satisfactory   Your total cholesterol is normal.  The triglycerides are high. Lowering  the amount of sugar ,alcohol and sweets in the diet helps to control this.Exercise and weight loss helps.  HDL which is called good cholesterol is low.  Your LDL cholesterol is normal.  This is often call bad cholesterol and high levels increase the risk for heart attacks and strokes.  Eat low cholesterol low fat  diet and do regular physical activity. Avoid high sugar containing food.  If you would like to see a dietitian regarding this then please let us know so we can put a referral for you to see a dietitian.  CBC result which includes white count Hemoglobin and  Platelet Counts is normal.   Your urine is negative for infection but positive for red blood cells  You had CT done in the past which was negative   I recommend that you see urologist for evaluation of red blood cells in urine .  Referral is placed    Please call the following number to make the appointment :  [facility]: [facility] Urology [facility] [phone]            Sincerely,      Dr.Nasima Dejuan MD,FACP

## 2021-03-03 ENCOUNTER — APPOINTMENT (OUTPATIENT)
Dept: INTERPRETER SERVICES | Facility: CLINIC | Age: 61
End: 2021-03-03
Payer: COMMERCIAL

## 2021-03-10 ENCOUNTER — OFFICE VISIT (OUTPATIENT)
Dept: SURGERY | Facility: CLINIC | Age: 61
End: 2021-03-10
Payer: COMMERCIAL

## 2021-03-10 VITALS — BODY MASS INDEX: 27.42 KG/M2 | HEIGHT: 62 IN | WEIGHT: 149 LBS

## 2021-03-10 DIAGNOSIS — L72.0 INCLUSION CYST: Primary | ICD-10-CM

## 2021-03-10 PROCEDURE — 99242 OFF/OP CONSLTJ NEW/EST SF 20: CPT | Performed by: SURGERY

## 2021-03-10 ASSESSMENT — MIFFLIN-ST. JEOR: SCORE: 1209.11

## 2021-03-12 NOTE — PROGRESS NOTES
[facility] Consultation    CHIEF COMPLAINT:  Chief Complaint   Patient presents with     Consult     Cyst on back of neck       HISTORY OF PRESENT ILLNESS:  Mariaelena Euceda is a 60 year old female who is seen in consultation at the request of Dr. Zaidi for evaluation of posterior neck and mid back cyst. She has been aware of this cyst for many years and over this time there has been fluctuations between swelling and drainage and then quiet times. Nowadays these areas are somewhat more itchy and irritated. She is here to discuss treatment.    REVIEW OF SYSTEMS:  Constitutional:  Negative for chills, fatigue, fever and weight change.  Neuro: No extremity, nor facial weakness  Psych:  No unexpected changes in mood  Eyes:  Negative for new vision problems.  ENT:  Negative for ENT pain.  Cardiovascular:  Negative for chest pain, palpitations.  Respiratory:  Negative for cough, dyspnea.  Gastrointestinal:  Negative for abdominal pain.     Musculoskeletal:  Negative for new arthralgias or myalgias.  Integumentary: Posterior neck and mid back cyst, multiple neck area skin tags.    Past Medical History:   Diagnosis Date     Urinary tract infection, site not specified        Past Surgical History:   Procedure Laterality Date     BIOPSY OF BREAST, INCISIONAL  2003    left breast, benign     HYSTERECTOMY, PAP NO LONGER INDICATED       LAPAROSCOPIC CHOLECYSTECTOMY WITH CHOLANGIOGRAMS N/A 1/8/2017    Procedure: LAPAROSCOPIC CHOLECYSTECTOMY WITH CHOLANGIOGRAMS;  Surgeon: James Umana MD;  Location: [facility] OR     SALPINGO OOPHORECTOMY,R/L/RANDELL       TUBAL LIGATION  1998       Family History has been reviewed.    Social History     Tobacco Use     Smoking status: Never Smoker     Smokeless tobacco: Never Used   Substance Use Topics     Alcohol use: No     Alcohol/week: 0.0 standard drinks       Patient Active Problem List   Diagnosis     Lumbar pain     CARDIOVASCULAR SCREENING; LDL GOAL LESS THAN 160      "Family history of diabetes mellitus     S/P laparoscopic cholecystectomy     Hematuria     Hyperlipidemia, unspecified hyperlipidemia type     H/O: hysterectomy     History of 2019 novel coronavirus disease (COVID-19)     Overactive bladder       No Known Allergies    Current Outpatient Medications   Medication Sig Dispense Refill     famotidine (PEPCID) 20 MG tablet Take 1 tablet (20 mg) by mouth 2 times daily as needed 60 tablet 3     mirabegron (MYRBETRIQ) 25 MG 24 hr tablet Take 1 tablet (25 mg) by mouth daily 30 tablet 11       Vitals: Ht 1.575 m (5' 2\")   Wt 67.6 kg (149 lb)   LMP 12/02/2010   BMI 27.25 kg/m    BMI= Body mass index is 27.25 kg/m .    EXAM:  GENERAL: healthy, alert and no distress     HEENT: moist mucus membranes, no scleral icterus,   CARDIOVASCULAR:  RRR, No JVD  NEURO:  Alert;  well oriented to time, place and person.  RESPIRATORY: non labored breathing  NECK: Neck supple. No noticeable masses.  No lymphadenopathy noted.  EXTREMITIES: warm and well perfused, no edema  SKIN: She has multiple skin tags on her neck. There is a cyst on the posterior neck that measures about 1-1/2 cm and another on her mid back that is about a centimeter in size. There is no evidence of active infection or inflammation.    LABS/Imaging: None    ASSESSMENT:  Mariaelena Euceda suffers from   1. Inclusion cyst  2. Neck skin tags      PLAN:  We discussed the alternatives between surgical excision versus trying some local wound care given that these cysts are small. She will try doing some manipulation and local wound care, if this resolves the cysts we will not pursue that further but if they don't resolve then we will perform a formal surgical excision. One way for another we will remove her skin tags at the ttime of cyst operation or on an independent time in clinic.    Mariaelena Euceda understands the risk, benefits, hopeful outcomes, and possible complications, both in the short and in the long term.  All her " questions answered.    It is my pleasure to participate in the care of Mariaelena Euceda. Thank you for this consultation.     If you have any questions please give me a call.    Best regards,  Didier Sharma MD    Please route or send letter to:  Primary Care Provider (PCP), Referring Provider and Include Progress Note    Total time with patient visit: 30 minutes more than half spent in counseling, explanation of procedures and coordination of care.

## 2021-04-20 ENCOUNTER — PRE VISIT (OUTPATIENT)
Dept: UROLOGY | Facility: CLINIC | Age: 61
End: 2021-04-20

## 2021-04-21 ENCOUNTER — OFFICE VISIT (OUTPATIENT)
Dept: UROLOGY | Facility: CLINIC | Age: 61
End: 2021-04-21
Attending: INTERNAL MEDICINE
Payer: COMMERCIAL

## 2021-04-21 VITALS
SYSTOLIC BLOOD PRESSURE: 112 MMHG | WEIGHT: 145 LBS | DIASTOLIC BLOOD PRESSURE: 70 MMHG | BODY MASS INDEX: 33.56 KG/M2 | HEIGHT: 55 IN

## 2021-04-21 DIAGNOSIS — R31.29 OTHER MICROSCOPIC HEMATURIA: Primary | ICD-10-CM

## 2021-04-21 LAB
ALBUMIN UR-MCNC: NEGATIVE MG/DL
ANION GAP SERPL CALCULATED.3IONS-SCNC: 4 MMOL/L (ref 3–14)
APPEARANCE UR: CLEAR
BILIRUB UR QL STRIP: NEGATIVE
BUN SERPL-MCNC: 13 MG/DL (ref 7–30)
CALCIUM SERPL-MCNC: 8.9 MG/DL (ref 8.5–10.1)
CHLORIDE SERPL-SCNC: 104 MMOL/L (ref 94–109)
CO2 SERPL-SCNC: 28 MMOL/L (ref 20–32)
COLOR UR AUTO: YELLOW
CREAT SERPL-MCNC: 0.56 MG/DL (ref 0.52–1.04)
GFR SERPL CREATININE-BSD FRML MDRD: >90 ML/MIN/{1.73_M2}
GLUCOSE SERPL-MCNC: 111 MG/DL (ref 70–99)
GLUCOSE UR STRIP-MCNC: NEGATIVE MG/DL
HGB UR QL STRIP: ABNORMAL
KETONES UR STRIP-MCNC: NEGATIVE MG/DL
LEUKOCYTE ESTERASE UR QL STRIP: ABNORMAL
NITRATE UR QL: NEGATIVE
PH UR STRIP: 5.5 PH (ref 5–7)
POTASSIUM SERPL-SCNC: 3.9 MMOL/L (ref 3.4–5.3)
SODIUM SERPL-SCNC: 136 MMOL/L (ref 133–144)
SOURCE: ABNORMAL
SP GR UR STRIP: >1.03 (ref 1–1.03)
UROBILINOGEN UR STRIP-ACNC: 0.2 EU/DL (ref 0.2–1)

## 2021-04-21 PROCEDURE — 80048 BASIC METABOLIC PNL TOTAL CA: CPT | Performed by: UROLOGY

## 2021-04-21 PROCEDURE — 81003 URINALYSIS AUTO W/O SCOPE: CPT | Performed by: UROLOGY

## 2021-04-21 PROCEDURE — 36415 COLL VENOUS BLD VENIPUNCTURE: CPT | Performed by: UROLOGY

## 2021-04-21 PROCEDURE — 99204 OFFICE O/P NEW MOD 45 MIN: CPT | Performed by: UROLOGY

## 2021-04-21 ASSESSMENT — ENCOUNTER SYMPTOMS
LEG PAIN: 0
NECK PAIN: 0
COUGH: 0
DIFFICULTY URINATING: 0
SYNCOPE: 0
WEAKNESS: 0
RECTAL PAIN: 0
HEARTBURN: 0
BREAST MASS: 0
EYE PAIN: 0
DECREASED CONCENTRATION: 0
NUMBNESS: 0
HEMATURIA: 0
SLEEP DISTURBANCES DUE TO BREATHING: 0
LIGHT-HEADEDNESS: 0
SNORES LOUDLY: 0
NERVOUS/ANXIOUS: 0
DOUBLE VISION: 0
CLAUDICATION: 0
CHILLS: 0
BOWEL INCONTINENCE: 0
JOINT SWELLING: 0
ALTERED TEMPERATURE REGULATION: 0
ABDOMINAL PAIN: 0
DECREASED LIBIDO: 0
SORE THROAT: 0
JAUNDICE: 0
HOARSE VOICE: 0
HEADACHES: 0
PANIC: 0
INSOMNIA: 0
VOMITING: 0
EXERCISE INTOLERANCE: 0
NECK MASS: 0
DIARRHEA: 0
BLOOD IN STOOL: 0
POLYPHAGIA: 0
HYPOTENSION: 0
HEMOPTYSIS: 0
HALLUCINATIONS: 0
ARTHRALGIAS: 0
SKIN CHANGES: 0
PARALYSIS: 0
TACHYCARDIA: 0
EXTREMITY NUMBNESS: 0
NAUSEA: 0
NIGHT SWEATS: 0
DECREASED APPETITE: 0
EYE IRRITATION: 0
TASTE DISTURBANCE: 0
MEMORY LOSS: 0
RECTAL BLEEDING: 0
MUSCLE WEAKNESS: 0
LOSS OF CONSCIOUSNESS: 0
DYSURIA: 0
POLYDIPSIA: 0
ORTHOPNEA: 0
BLOATING: 0
WEIGHT GAIN: 0
SMELL DISTURBANCE: 0
FLANK PAIN: 0
SWOLLEN GLANDS: 0
CONSTIPATION: 0
DEPRESSION: 0
SPEECH CHANGE: 0
NAIL CHANGES: 0
COUGH DISTURBING SLEEP: 0
MYALGIAS: 0
RESPIRATORY PAIN: 0
BREAST PAIN: 0
BRUISES/BLEEDS EASILY: 0
WEIGHT LOSS: 0
HOT FLASHES: 0
MUSCLE CRAMPS: 0
TROUBLE SWALLOWING: 0
SINUS CONGESTION: 0
WHEEZING: 0
PALPITATIONS: 0
BACK PAIN: 0
TINGLING: 0
DYSPNEA ON EXERTION: 0
SEIZURES: 0
DISTURBANCES IN COORDINATION: 0
FEVER: 0
INCREASED ENERGY: 0
EYE REDNESS: 0
STIFFNESS: 0
TREMORS: 0
LEG SWELLING: 0
HYPERTENSION: 0
POSTURAL DYSPNEA: 0
FATIGUE: 0
POOR WOUND HEALING: 0
SINUS PAIN: 0
SPUTUM PRODUCTION: 0
SHORTNESS OF BREATH: 0
EYE WATERING: 0
DIZZINESS: 0

## 2021-04-21 ASSESSMENT — MIFFLIN-ST. JEOR: SCORE: 1079.85

## 2021-04-21 ASSESSMENT — PAIN SCALES - GENERAL: PAINLEVEL: NO PAIN (0)

## 2021-04-21 NOTE — NURSING NOTE
Chief Complaint   Patient presents with     Hematuria     here to evalulate microhematuria   Fina Larsen LPN

## 2021-04-21 NOTE — PROGRESS NOTES
2021    Referring Provider/Primary Care Provider: Janell Zaidi    Assessment & Plan     Other microscopic hematuria  We discussed the etiologies of microscopic hematuria to include but not limited to infection, nephrolithiasis, urologic malignancy, renal disease and idiopathic.  We further discussed that the evaluation includes BMP,  imaging with a CT urogram and office cystoscopy.  Patient is agreeable and the will return to cystoscopy after imaging complete.    - Basic metabolic panel  - CT Urogram wo & w Contrast; Future    Review of external notes as documented elsewhere in note  Review of the result(s) of each unique test - urinalysis from , urinalysis from , CT scan from     RTC for cystoscopy after CT scan    Jacey Pearce MD MPH  (she/her/hers)   of Urology  [facility]    HPI:  Mariaelena Euceda is a 60 year old female who presents for evaluation of her pelvic floor symptoms.  She is primarily Montserratian speaking and the entire visit is conducted with the assistance of a .    Per review of Dr Zaidi's notes patient was sent for evaluation of microscopic hematuria.  She has had a urinalysis from  and  with 5-10 RBC.  In Epic she also has a CT scan from  for microscopic hematuria but I do not see any urologic records    She is not a smoker, has not had urologic surgery, she denies history of kidney stones, denies exposure to chemicals.  She denies any family history of urologic diseases or malignacny    Recently some increased urinary urgency and fecal urgency.  Also some constipation this past month as well.  Did note some lower abdominal pain this weekend that has now resolved    5 . Has had a TL and laparoscopic hysterectomy for menorrhagia.      Denies gross hematuria, UTI, urinary incontinence, vaginal bleeding, vaginal bulge.  Denies history of abuse    Health maintenance screening:  Pap smear n/a  Colonoscopy   [facility]  Mammogram normal 2021    Past Medical History:   Diagnosis Date     Urinary tract infection, site not specified      Past Surgical History:   Procedure Laterality Date     BIOPSY OF BREAST, INCISIONAL  2003    left breast, benign     HYSTERECTOMY, PAP NO LONGER INDICATED       LAPAROSCOPIC CHOLECYSTECTOMY WITH CHOLANGIOGRAMS N/A 1/8/2017    Procedure: LAPAROSCOPIC CHOLECYSTECTOMY WITH CHOLANGIOGRAMS;  Surgeon: James Umana MD;  Location: [facility] OR     SALPINGO OOPHORECTOMY,R/L/RANDELL       TUBAL LIGATION  1998     Social History     Socioeconomic History     Marital status:      Spouse name: Not on file     Number of children: 5     Years of education: Not on file     Highest education level: Not on file   Occupational History     Employer: [employer]     Comment: , full time   Social Needs     Financial resource strain: Not on file     Food insecurity     Worry: Not on file     Inability: Not on file     Transportation needs     Medical: Not on file     Non-medical: Not on file   Tobacco Use     Smoking status: Never Smoker     Smokeless tobacco: Never Used   Substance and Sexual Activity     Alcohol use: No     Alcohol/week: 0.0 standard drinks     Drug use: No     Sexual activity: Yes     Partners: Male     Birth control/protection: Surgical     Comment: Pt had TL done 1998   Lifestyle     Physical activity     Days per week: Not on file     Minutes per session: Not on file     Stress: Not on file   Relationships     Social connections     Talks on phone: Not on file     Gets together: Not on file     Attends Orthodox service: Not on file     Active member of club or organization: Not on file     Attends meetings of clubs or organizations: Not on file     Relationship status: Not on file     Intimate partner violence     Fear of current or ex partner: Not on file     Emotionally abused: Not on file     Physically abused: Not on file     Forced sexual activity: Not on  file   Other Topics Concern      Service Not Asked     Blood Transfusions No     Caffeine Concern Not Asked     Occupational Exposure Not Asked     Hobby Hazards Not Asked     Sleep Concern No     Stress Concern No     Weight Concern Not Asked     Special Diet Not Asked     Back Care Not Asked     Exercise Not Asked     Bike Helmet Not Asked     Seat Belt Not Asked     Self-Exams Not Asked     Parent/sibling w/ CABG, MI or angioplasty before 65F 55M? Not Asked   Social History Narrative     Not on file     Family History   Problem Relation Age of Onset     Diabetes Mother      Prostate Cancer Father      Diabetes Father      Diabetes Brother      Diabetes Sister      Breast Cancer No family hx of      Cancer - colorectal No family hx of      Review of Systems     Constitutional:  Negative for fever, chills, weight loss, weight gain, fatigue, decreased appetite, night sweats, recent stressors, height gain, height loss, post-operative complications, incisional pain, hallucinations, increased energy, hyperactivity and confused.   HENT:  Negative for ear pain, hearing loss, tinnitus, nosebleeds, trouble swallowing, hoarse voice, mouth sores, sore throat, ear discharge, tooth pain, gum tenderness, taste disturbance, smell disturbance, hearing aid, bleeding gums, dry mouth, sinus pain, sinus congestion and neck mass.    Eyes:  Negative for double vision, pain, redness, eye pain, decreased vision, eye watering, eye bulging, eye dryness, flashing lights, spots, floaters, strabismus, tunnel vision, jaundice and eye irritation.   Respiratory:   Negative for cough, hemoptysis, sputum production, shortness of breath, wheezing, sleep disturbances due to breathing, snores loudly, respiratory pain, dyspnea on exertion, cough disturbing sleep and postural dyspnea.    Cardiovascular:  Negative for chest pain, dyspnea on exertion, palpitations, orthopnea, claudication, leg swelling, fingers/toes turn blue, hypertension,  hypotension, syncope, history of heart murmur, chest pain on exertion, chest pain at rest, pacemaker, few scattered varicosities, leg pain, sleep disturbances due to breathing, tachycardia, light-headedness, exercise intolerance and edema.   Gastrointestinal:  Negative for heartburn, nausea, vomiting, abdominal pain, diarrhea, constipation, blood in stool, melena, rectal pain, bloating, hemorrhoids, bowel incontinence, jaundice, rectal bleeding, coffee ground emesis and change in stool.   Genitourinary:  Negative for bladder incontinence, dysuria, urgency, hematuria, flank pain, vaginal discharge, difficulty urinating, genital sores, dyspareunia, decreased libido, nocturia, voiding less frequently, arousal difficulty, abnormal vaginal bleeding, excessive menstruation, menstrual changes, hot flashes, vaginal dryness and postmenopausal bleeding.   Musculoskeletal:  Negative for myalgias, back pain, joint swelling, arthralgias, stiffness, muscle cramps, neck pain, bone pain, muscle weakness and fracture.   Skin:  Negative for nail changes, itching, poor wound healing, rash, hair changes, skin changes, acne, warts, poor wound healing, scarring, flaky skin, Raynaud's phenomenon, sensitivity to sunlight and skin thickening.   Neurological:  Negative for dizziness, tingling, tremors, speech change, seizures, loss of consciousness, weakness, light-headedness, numbness, headaches, disturbances in coordination, extremity numbness, memory loss, difficulty walking and paralysis.   Endo/Heme:  Negative for anemia, swollen glands and bruises/bleeds easily.   Psychiatric/Behavioral:  Negative for depression, hallucinations, memory loss, decreased concentration, mood swings and panic attacks.    Breast:  Negative for breast discharge, breast mass, breast pain and nipple retraction.   Endocrine:  Negative for altered temperature regulation, polyphagia, polydipsia, unwanted hair growth and change in facial hair.      No Known  Allergies    Current Outpatient Medications   Medication     famotidine (PEPCID) 20 MG tablet     mirabegron (MYRBETRIQ) 25 MG 24 hr tablet     No current facility-administered medications for this visit.    LMP 12/02/2010   GENERAL: healthy, alert and no distress  EYES: Eyes grossly normal to inspection, conjunctivae and sclerae normal  HENT: normal cephalic/atraumatic.  External ears, nose and mouth without ulcers or lesions.  RESP: no audible wheeze, cough, or visible cyanosis.  No visible retractions or increased work of breathing.  Able to speak fully in complete sentences.  NEURO: Cranial nerves grossly intact, mentation intact and speech normal  PSYCH: mentation appears normal, affect normal/bright, judgement and insight intact, normal speech and appearance well-groomed    CC  Patient Care Team:  Janell Zaidi MD as PCP - General (Internal Medicine)  Janell Zadii MD as Assigned PCP  Didier Sharma MD as Assigned Surgical Provider  ZHANG FERRIS

## 2021-04-21 NOTE — PATIENT INSTRUCTIONS
Websites with free information:    [employer] patient website: www.voicesforpfd.org    Total Control Program: www.totalcontrolprogram.com    Patient Education     Cistoscopia    La cistoscopia es un procedimiento que permite al proveedor de atención médica observar directamente la uretra y la vejiga. Puede usarse para:    Ayudar a diagnosticar un problema de la uretra, la vejiga o los riñones.    Kathy sherine muestra (biopsia) de tejido de la vejiga o de la uretra.    Tratar ciertos problemas (den la extracción de cálculos renales).    Colocar sherine sonda (stent) para evitar sherine obstrucción.    Kathy radiografías especiales de los riñones.  Según los resultados, es posible que jean proveedor le sugiera otras pruebas o tratamientos.   Qué es un cistoscopio?  Un cistoscopio es un tubo similar a un telescopio que contiene lentes y filamentos de maribel para la transmisión silviano (fibra óptica). El cistoscopio puede ser recto y rígido, o desi flexible para que pueda doblarse en las curvas de la uretra. El proveedor de atención médica puede mirar directamente dentro del cistoscopio o proyectar la imagen en sherine pantalla.  Preparativos    Pregúntele al proveedor de atención médica si debe dejar de kathy algún medicamento antes del procedimiento.    Siga las instrucciones que le den con respecto a no comer ni beber antes del procedimiento.    Siga cualquier otra instrucción que le dé jean proveedor de atención médica.    Antes del examen, informe a jean proveedor de atención médica si usted:    Está tomando medicamentos, den aspirina o anticoagulantes. [place] también incluye los medicamentos recetados y de venta aayush, vitaminas, hierbas y otros suplementos.    Tiene alergia a algún medicamento.    Está embarazada o danie que puede estarlo.    Rosangela el procedimiento  La cistoscopia se realiza en el consultorio del proveedor de atención médica, un centro de cirugía [facility]. Además del médico, está presente  sherine enfermera danna el procedimiento. [place] procedimiento susie solo unos minutos. Dura un poco más si es necesario hacer sherine biopsia, radiografías o tratamiento.  Danna el procedimiento:    Usted está en posición horizontal sobre sherine alicja, boca arriba, con las rodillas dobladas, las piernas separadas y cubierto con sherine sábana.    Le lavarán la uretra y la melanie alrededor de esta. Es posible que le apliquen un ungüento anestésico para insensibilizar la uretra. Normalmente no se necesita ningún otro tipo de medicamentos contra el dolor. En algunos casos, es posible que le ofrezcan un sedante suave para ayudarle a relajarse. Si fuera necesario ampliar el procedimiento, den por ejemplo hacer también sherine biopsia o extraer un cálculo renal, es posible que se necesite anestesia general. A menudo se administra un antibiótico por única vez.    Se inserta el cistoscopio y se introduce un líquido estéril en la vejiga para dilatarla. Es posible que sienta presión a causa de luz marina líquido.    Cuando se termina el procedimiento, se retira el cistoscopio.  Después del procedimiento  Si ha recibido un sedante, anestesia general o anestesia storm, debe pedir a alguien que le lleve a casa. Sherine vez que esté en casa:    [place] mucho líquido.    Es posible que sienta ardor en la uretra al orinar, o que tenga un poco de samuel en la orina. [place] es normal.    Devin vez le receten medicamentos para aliviar el dolor leve o prevenir infecciones. [place] los medicamentos según le hayan indicado.    Llame a jean proveedor de atención médica si tiene sangrado abundante o coágulos de samuel, ardor que persiste danna más de un día, fiebre superior a  100  F ( 38 C ) o dificultad para orinar.    [id number] [employer] Todos los derechos reservados. Esta información no pretende sustituir la atención médica profesional. Sólo jean médico puede diagnosticar y tratar un problema de pilar.

## 2021-04-21 NOTE — LETTER
4/21/2021       RE: Mariaelena Euceda  [address]  [facility] [address]     Dear Colleague,    Thank you for referring your patient, Mariaelena Euceda, to the [facility] at [facility]. Please see a copy of my visit note below.    April 21, 2021    Referring Provider/Primary Care Provider: Janell Zaidi    Assessment & Plan     Other microscopic hematuria  We discussed the etiologies of microscopic hematuria to include but not limited to infection, nephrolithiasis, urologic malignancy, renal disease and idiopathic.  We further discussed that the evaluation includes BMP,  imaging with a CT urogram and office cystoscopy.  Patient is agreeable and the will return to cystoscopy after imaging complete.    - Basic metabolic panel  - CT Urogram wo & w Contrast; Future    Review of external notes as documented elsewhere in note  Review of the result(s) of each unique test - urinalysis from 2020, urinalysis from 2021, CT scan from 2017    RTC for cystoscopy after CT scan    Jacey Pearce MD MPH  (she/her/hers)   of Urology  [facility]    HPI:  Mariaelena Euceda is a 60 year old female who presents for evaluation of her pelvic floor symptoms.  She is primarily Kiswahili speaking and the entire visit is conducted with the assistance of a .    Per review of Dr Zaidi's notes patient was sent for evaluation of microscopic hematuria.  She has had a urinalysis from 2021 and 2020 with 5-10 RBC.  In Epic she also has a CT scan from 2017 for microscopic hematuria but I do not see any urologic records    She is not a smoker, has not had urologic surgery, she denies history of kidney stones, denies exposure to chemicals.  She denies any family history of urologic diseases or malignacny    Recently some increased urinary urgency and fecal urgency.  Also some constipation this past month as well.  Did note  some lower abdominal pain this weekend that has now resolved    5 . Has had a TL and laparoscopic hysterectomy for menorrhagia.      Denies gross hematuria, UTI, urinary incontinence, vaginal bleeding, vaginal bulge.  Denies history of abuse    Health maintenance screening:  Pap smear n/a  Colonoscopy  [facility]  Mammogram normal     Past Medical History:   Diagnosis Date     Urinary tract infection, site not specified      Past Surgical History:   Procedure Laterality Date     BIOPSY OF BREAST, INCISIONAL      left breast, benign     HYSTERECTOMY, PAP NO LONGER INDICATED       LAPAROSCOPIC CHOLECYSTECTOMY WITH CHOLANGIOGRAMS N/A 2017    Procedure: LAPAROSCOPIC CHOLECYSTECTOMY WITH CHOLANGIOGRAMS;  Surgeon: James Umana MD;  Location: [facility] OR     SALPINGO OOPHORECTOMY,R/L/RANDELL       TUBAL LIGATION       Social History     Socioeconomic History     Marital status:      Spouse name: Not on file     Number of children: 5     Years of education: Not on file     Highest education level: Not on file   Occupational History     Employer: [employer]     Comment: , full time   Social Needs     Financial resource strain: Not on file     Food insecurity     Worry: Not on file     Inability: Not on file     Transportation needs     Medical: Not on file     Non-medical: Not on file   Tobacco Use     Smoking status: Never Smoker     Smokeless tobacco: Never Used   Substance and Sexual Activity     Alcohol use: No     Alcohol/week: 0.0 standard drinks     Drug use: No     Sexual activity: Yes     Partners: Male     Birth control/protection: Surgical     Comment: Pt had TL done    Lifestyle     Physical activity     Days per week: Not on file     Minutes per session: Not on file     Stress: Not on file   Relationships     Social connections     Talks on phone: Not on file     Gets together: Not on file     Attends Gnosticist service: Not on file     Active member of club  or organization: Not on file     Attends meetings of clubs or organizations: Not on file     Relationship status: Not on file     Intimate partner violence     Fear of current or ex partner: Not on file     Emotionally abused: Not on file     Physically abused: Not on file     Forced sexual activity: Not on file   Other Topics Concern      Service Not Asked     Blood Transfusions No     Caffeine Concern Not Asked     Occupational Exposure Not Asked     Hobby Hazards Not Asked     Sleep Concern No     Stress Concern No     Weight Concern Not Asked     Special Diet Not Asked     Back Care Not Asked     Exercise Not Asked     Bike Helmet Not Asked     Seat Belt Not Asked     Self-Exams Not Asked     Parent/sibling w/ CABG, MI or angioplasty before 65F 55M? Not Asked   Social History Narrative     Not on file     Family History   Problem Relation Age of Onset     Diabetes Mother      Prostate Cancer Father      Diabetes Father      Diabetes Brother      Diabetes Sister      Breast Cancer No family hx of      Cancer - colorectal No family hx of      Review of Systems     Constitutional:  Negative for fever, chills, weight loss, weight gain, fatigue, decreased appetite, night sweats, recent stressors, height gain, height loss, post-operative complications, incisional pain, hallucinations, increased energy, hyperactivity and confused.   HENT:  Negative for ear pain, hearing loss, tinnitus, nosebleeds, trouble swallowing, hoarse voice, mouth sores, sore throat, ear discharge, tooth pain, gum tenderness, taste disturbance, smell disturbance, hearing aid, bleeding gums, dry mouth, sinus pain, sinus congestion and neck mass.    Eyes:  Negative for double vision, pain, redness, eye pain, decreased vision, eye watering, eye bulging, eye dryness, flashing lights, spots, floaters, strabismus, tunnel vision, jaundice and eye irritation.   Respiratory:   Negative for cough, hemoptysis, sputum production, shortness of breath,  wheezing, sleep disturbances due to breathing, snores loudly, respiratory pain, dyspnea on exertion, cough disturbing sleep and postural dyspnea.    Cardiovascular:  Negative for chest pain, dyspnea on exertion, palpitations, orthopnea, claudication, leg swelling, fingers/toes turn blue, hypertension, hypotension, syncope, history of heart murmur, chest pain on exertion, chest pain at rest, pacemaker, few scattered varicosities, leg pain, sleep disturbances due to breathing, tachycardia, light-headedness, exercise intolerance and edema.   Gastrointestinal:  Negative for heartburn, nausea, vomiting, abdominal pain, diarrhea, constipation, blood in stool, melena, rectal pain, bloating, hemorrhoids, bowel incontinence, jaundice, rectal bleeding, coffee ground emesis and change in stool.   Genitourinary:  Negative for bladder incontinence, dysuria, urgency, hematuria, flank pain, vaginal discharge, difficulty urinating, genital sores, dyspareunia, decreased libido, nocturia, voiding less frequently, arousal difficulty, abnormal vaginal bleeding, excessive menstruation, menstrual changes, hot flashes, vaginal dryness and postmenopausal bleeding.   Musculoskeletal:  Negative for myalgias, back pain, joint swelling, arthralgias, stiffness, muscle cramps, neck pain, bone pain, muscle weakness and fracture.   Skin:  Negative for nail changes, itching, poor wound healing, rash, hair changes, skin changes, acne, warts, poor wound healing, scarring, flaky skin, Raynaud's phenomenon, sensitivity to sunlight and skin thickening.   Neurological:  Negative for dizziness, tingling, tremors, speech change, seizures, loss of consciousness, weakness, light-headedness, numbness, headaches, disturbances in coordination, extremity numbness, memory loss, difficulty walking and paralysis.   Endo/Heme:  Negative for anemia, swollen glands and bruises/bleeds easily.   Psychiatric/Behavioral:  Negative for depression, hallucinations, memory  loss, decreased concentration, mood swings and panic attacks.    Breast:  Negative for breast discharge, breast mass, breast pain and nipple retraction.   Endocrine:  Negative for altered temperature regulation, polyphagia, polydipsia, unwanted hair growth and change in facial hair.      No Known Allergies    Current Outpatient Medications   Medication     famotidine (PEPCID) 20 MG tablet     mirabegron (MYRBETRIQ) 25 MG 24 hr tablet     No current facility-administered medications for this visit.    LMP 12/02/2010   GENERAL: healthy, alert and no distress  EYES: Eyes grossly normal to inspection, conjunctivae and sclerae normal  HENT: normal cephalic/atraumatic.  External ears, nose and mouth without ulcers or lesions.  RESP: no audible wheeze, cough, or visible cyanosis.  No visible retractions or increased work of breathing.  Able to speak fully in complete sentences.  NEURO: Cranial nerves grossly intact, mentation intact and speech normal  PSYCH: mentation appears normal, affect normal/bright, judgement and insight intact, normal speech and appearance well-groomed    CC  Patient Care Team:  Janell Zaidi MD as PCP - General (Internal Medicine)  Janell Zaidi MD as Assigned PCP  Didier Sharma MD as Assigned Surgical Provider  ZHANG FERRIS

## 2021-04-27 ENCOUNTER — HOSPITAL ENCOUNTER (OUTPATIENT)
Dept: CT IMAGING | Facility: CLINIC | Age: 61
Discharge: HOME | End: 2021-04-27
Attending: UROLOGY | Admitting: UROLOGY
Payer: COMMERCIAL

## 2021-04-27 DIAGNOSIS — R31.29 OTHER MICROSCOPIC HEMATURIA: ICD-10-CM

## 2021-04-27 PROCEDURE — 250N000009 HC RX 250: Performed by: UROLOGY

## 2021-04-27 PROCEDURE — 250N000011 HC RX IP 250 OP 636: Performed by: UROLOGY

## 2021-04-27 PROCEDURE — 74178 CT ABD&PLV WO CNTR FLWD CNTR: CPT

## 2021-04-27 RX ORDER — IOPAMIDOL 755 MG/ML
73 INJECTION, SOLUTION INTRAVASCULAR ONCE
Status: COMPLETED | OUTPATIENT
Start: 2021-04-27 | End: 2021-04-27

## 2021-04-27 RX ADMIN — SODIUM CHLORIDE 61 ML: 9 INJECTION, SOLUTION INTRAVENOUS at 10:35

## 2021-04-27 RX ADMIN — IOPAMIDOL 73 ML: 755 INJECTION, SOLUTION INTRAVENOUS at 10:34

## 2021-06-07 ENCOUNTER — APPOINTMENT (OUTPATIENT)
Dept: INTERPRETER SERVICES | Facility: CLINIC | Age: 61
End: 2021-06-07
Payer: COMMERCIAL

## 2021-06-08 ENCOUNTER — APPOINTMENT (OUTPATIENT)
Dept: INTERPRETER SERVICES | Facility: CLINIC | Age: 61
End: 2021-06-08
Payer: COMMERCIAL

## 2021-06-08 ENCOUNTER — OFFICE VISIT (OUTPATIENT)
Dept: UROLOGY | Facility: CLINIC | Age: 61
End: 2021-06-08
Payer: COMMERCIAL

## 2021-06-08 VITALS
HEART RATE: 78 BPM | SYSTOLIC BLOOD PRESSURE: 122 MMHG | HEIGHT: 59 IN | OXYGEN SATURATION: 98 % | DIASTOLIC BLOOD PRESSURE: 70 MMHG | WEIGHT: 145 LBS | BODY MASS INDEX: 29.23 KG/M2

## 2021-06-08 DIAGNOSIS — R31.29 MICROSCOPIC HEMATURIA: Primary | ICD-10-CM

## 2021-06-08 LAB
ALBUMIN UR-MCNC: NEGATIVE MG/DL
APPEARANCE UR: CLEAR
BILIRUB UR QL STRIP: NEGATIVE
COLOR UR AUTO: YELLOW
GLUCOSE UR STRIP-MCNC: NEGATIVE MG/DL
HGB UR QL STRIP: ABNORMAL
KETONES UR STRIP-MCNC: NEGATIVE MG/DL
LEUKOCYTE ESTERASE UR QL STRIP: NEGATIVE
NITRATE UR QL: NEGATIVE
PH UR STRIP: 5.5 PH (ref 5–7)
SOURCE: ABNORMAL
SP GR UR STRIP: 1.02 (ref 1–1.03)
UROBILINOGEN UR STRIP-ACNC: 0.2 EU/DL (ref 0.2–1)

## 2021-06-08 PROCEDURE — 81003 URINALYSIS AUTO W/O SCOPE: CPT | Performed by: UROLOGY

## 2021-06-08 PROCEDURE — 52000 CYSTOURETHROSCOPY: CPT | Performed by: UROLOGY

## 2021-06-08 PROCEDURE — T1013 SIGN LANG/ORAL INTERPRETER: HCPCS | Mod: GT | Performed by: UROLOGY

## 2021-06-08 RX ORDER — LIDOCAINE HYDROCHLORIDE 20 MG/ML
JELLY TOPICAL ONCE
Status: DISCONTINUED | OUTPATIENT
Start: 2021-06-08 | End: 2021-06-19 | Stop reason: HOSPADM

## 2021-06-08 ASSESSMENT — PAIN SCALES - GENERAL: PAINLEVEL: NO PAIN (0)

## 2021-06-08 ASSESSMENT — MIFFLIN-ST. JEOR: SCORE: 1143.35

## 2021-06-08 NOTE — NURSING NOTE
"Chief Complaint   Patient presents with     Micro-Hematuria     Patient here today for cystoscopy       Blood pressure 122/70, pulse 78, height 1.499 m (4' 11\"), weight 65.8 kg (145 lb), last menstrual period 12/02/2010, SpO2 98 %, not currently breastfeeding. Body mass index is 29.29 kg/m .    Patient Active Problem List   Diagnosis     Lumbar pain     CARDIOVASCULAR SCREENING; LDL GOAL LESS THAN 160     Family history of diabetes mellitus     S/P laparoscopic cholecystectomy     Hematuria     Hyperlipidemia, unspecified hyperlipidemia type     H/O: hysterectomy     History of 2019 novel coronavirus disease (COVID-19)     Overactive bladder       No Known Allergies    Current Outpatient Medications   Medication Sig Dispense Refill     famotidine (PEPCID) 20 MG tablet Take 1 tablet (20 mg) by mouth 2 times daily as needed (Patient not taking: Reported on 6/8/2021) 60 tablet 3     mirabegron (MYRBETRIQ) 25 MG 24 hr tablet Take 1 tablet (25 mg) by mouth daily (Patient not taking: Reported on 6/8/2021) 30 tablet 11       Social History     Tobacco Use     Smoking status: Never Smoker     Smokeless tobacco: Never Used   Substance Use Topics     Alcohol use: No     Alcohol/week: 0.0 standard drinks     Drug use: No       Prior to the start of the procedure and with procedural staff participation, I verbally confirmed the patient s identity using two indicators, relevant allergies, that the procedure was appropriate and matched the consent or emergent situation, and that the correct equipment/implants were available. Immediately prior to starting the procedure I conducted the Time Out with the procedural staff and re-confirmed the patient s name, procedure, and site/side. I have wiped the patient off with the povidone-Iodine solution, draped them,  used Lidocaine hydrochloride jelly, and instilled sterile water into the bladder. (The Joint Commission universal protocol was followed.)  Yes    Sedation (Moderate or Deep): " None    5mL 2% lidocaine hydrochloride Urojet instilled into urethra.    NDC# [address]-3  Lot #: [id number]  Expiration Date:  12/22      Yuridia Laoway, RMA  6/8/2021  08:30AM

## 2021-06-08 NOTE — LETTER
"6/8/2021       RE: Mariaelena Euceda  [address] [facility] [address]     Dear Colleague,    Thank you for referring your patient, Mariaelena Euceda, to the [facility] at [facility]. Please see a copy of my visit note below.    June 8, 2021    Return visit    Patient returns today for follow up.  She is primarily Guamanian speaking and the entire visit today is conducted with the assistance of an .  She denies any changes in her health since last visit.    /70   Pulse 78   Ht 1.499 m (4' 11\")   Wt 65.8 kg (145 lb)   LMP 12/02/2010   SpO2 98%   BMI 29.29 kg/m    She is comfortable, in no distress, non-labored breathing.  Abdomen is soft, non-tender, non-distended.  Normal external female genitalia.  Negative CST.  Pelvic exam is unremarkable    Cystoscopy Note: After informed consent was obtained patient was prepped and draped in the standard fashion.  The flexible cystoscope was inserted into a normal appearing urethral meatus.  The urothelium was carefully examined and there were no tumors, masses, stones, foreign bodies, or other urothelial abnormalities noted.  Bilateral ureteral orifices were noted in the normal orthotopic position and both effluxed clear urine.  The cystoscope was retroflexed and the bladder neck was unremarkable.  The urethra was carefully examined upon removing the cystoscope and was unremarkable.  Patient tolerated the procedure without complications noted.      CT urogram images were reviewed by me and without obvious urologic abnormality or etiology of the microscopic hematuria    A/P: 60 year old F with microscopic hematuria s/p negative urologic evaluation    This has been long standing recommend nephrology evaluation    RTC 1 year, sooner if needed    Jacey Pearce MD MPH   of Urology    CC  Patient Care Team:  Janell Zaidi MD as PCP - General (Internal " Medicine)  Janell Zaidi MD as Assigned PCP  Jacey Pearce MD as Assigned Surgical Provider

## 2021-06-08 NOTE — PATIENT INSTRUCTIONS
"Websites with free information:    American [employer]ro[employer]nec[employer] Society patient website: www.voicesforpfd.org    Total Control Program: www.totalcontrolprogram.com    Please see the nephrology doctor    It was a pleasure meeting with you today.  Thank you for allowing me and my team the privilege of caring for you today.  YOU are the reason we are here, and I truly hope we provided you with the excellent service you deserve.  Please let us know if there is anything else we can do for you so that we can be sure you are leaving completely satisfied with your care experience.    AFTER YOUR CYSTOSCOPY  ?  ?  You have just completed a cystoscopy, or \"cysto\", which allowed your physician to learn more about your bladder (or to remove a stent placed after surgery). We suggest that you continue to avoid caffeine, fruit juice, and alcohol for the next 24 hours, however, you are encouraged to return to your normal activities.  ?  ?  A few things that are considered normal after your cystoscopy:  ?  * small amount of bleeding (or spotting) that clears within the next 24 hours  ?  * slight burning sensation with urination  ?  * sensation of needing to void (urinate) more frequently  ?  * the feeling of \"air\" in your urine  ?  * mild discomfort that is relieved with Tylenol    * bladder spasms  ?  ?  ?  Please contact our office promptly if you:  ?  * develop a fever above 101 degrees  ?  * are unable to urinate  ?  * develop bright red blood that does not stop  ?  * experience severe pain or swelling  ?  ?  ?  And of course, please contact our office with any concerns or questions [phone]  ?    "

## 2021-06-08 NOTE — PROGRESS NOTES
"June 8, 2021    Return visit    Patient returns today for follow up.  She is primarily Mauritanian speaking and the entire visit today is conducted with the assistance of an .  She denies any changes in her health since last visit.    /70   Pulse 78   Ht 1.499 m (4' 11\")   Wt 65.8 kg (145 lb)   LMP 12/02/2010   SpO2 98%   BMI 29.29 kg/m    She is comfortable, in no distress, non-labored breathing.  Abdomen is soft, non-tender, non-distended.  Normal external female genitalia.  Negative CST.  Pelvic exam is unremarkable    Cystoscopy Note: After informed consent was obtained patient was prepped and draped in the standard fashion.  The flexible cystoscope was inserted into a normal appearing urethral meatus.  The urothelium was carefully examined and there were no tumors, masses, stones, foreign bodies, or other urothelial abnormalities noted.  Bilateral ureteral orifices were noted in the normal orthotopic position and both effluxed clear urine.  The cystoscope was retroflexed and the bladder neck was unremarkable.  The urethra was carefully examined upon removing the cystoscope and was unremarkable.  Patient tolerated the procedure without complications noted.      CT urogram images were reviewed by me and without obvious urologic abnormality or etiology of the microscopic hematuria    A/P: 60 year old F with microscopic hematuria s/p negative urologic evaluation    This has been long standing recommend nephrology evaluation    RTC 1 year, sooner if needed    Jacey Pearce MD MPH   of Urology    CC  Patient Care Team:  Janell Zaidi MD as PCP - General (Internal Medicine)  Janell Zaidi MD as Assigned PCP  Jacey Pearce MD as Assigned Surgical Provider                  "

## 2021-10-17 ENCOUNTER — OFFICE VISIT (OUTPATIENT)
Dept: URGENT CARE | Facility: URGENT CARE | Age: 61
End: 2021-10-17
Payer: COMMERCIAL

## 2021-10-17 VITALS
HEART RATE: 81 BPM | OXYGEN SATURATION: 98 % | TEMPERATURE: 97.6 F | RESPIRATION RATE: 16 BRPM | DIASTOLIC BLOOD PRESSURE: 70 MMHG | WEIGHT: 145 LBS | SYSTOLIC BLOOD PRESSURE: 123 MMHG | BODY MASS INDEX: 29.29 KG/M2

## 2021-10-17 DIAGNOSIS — L04.9 LYMPHADENITIS, ACUTE: ICD-10-CM

## 2021-10-17 DIAGNOSIS — J03.90 TONSILLITIS: ICD-10-CM

## 2021-10-17 DIAGNOSIS — R07.0 THROAT PAIN: Primary | ICD-10-CM

## 2021-10-17 LAB
DEPRECATED S PYO AG THROAT QL EIA: NEGATIVE
GROUP A STREP BY PCR: NOT DETECTED

## 2021-10-17 PROCEDURE — 87651 STREP A DNA AMP PROBE: CPT | Performed by: PHYSICIAN ASSISTANT

## 2021-10-17 PROCEDURE — 99214 OFFICE O/P EST MOD 30 MIN: CPT | Performed by: PHYSICIAN ASSISTANT

## 2021-10-17 RX ORDER — AMOXICILLIN 875 MG
875 TABLET ORAL 2 TIMES DAILY
Qty: 20 TABLET | Refills: 0 | Status: SHIPPED | OUTPATIENT
Start: 2021-10-17 | End: 2021-10-27

## 2021-10-17 RX ORDER — IBUPROFEN 600 MG/1
600 TABLET, FILM COATED ORAL EVERY 6 HOURS PRN
Qty: 30 TABLET | Refills: 0 | Status: SHIPPED | OUTPATIENT
Start: 2021-10-17 | End: 2022-03-04 | Stop reason: ALTCHOICE

## 2021-10-17 NOTE — PATIENT INSTRUCTIONS
Patient Education     Amigdalitis en adultos  La amigdalitis es la hinchazón y el enrojecimiento (inflamación) de las amígdalas. Ocurre cuando las amígdalas están infectadas por un virus o sherine bacteria. Las amígdalas son 2 glándulas linfáticas rosadas y ovales en la parte posterior de la garganta. [place] parte del sistema inmunitario, cuya función es combatir las infecciones. Reaccionan cuando los gérmenes entran en la nariz y la boca.   La amigdalitis es muy común. Se ve con mayor frecuencia en niños, jose maria también puede ocurrir en adultos jóvenes.   Los virus y las bacterias que causan la amigdalitis pueden transmitirse fácilmente de sherine persona a otra.      Qué causa la amigdalitis?  La causa principal de la amigdalitis son los virus.   Los virus comunes que causan amigdalitis incluyen los siguientes:    Virus del resfriado    Adenovirus    Virus de Vicenta Butler    Mononucleosis infecciosa    Virus del herpes simple (VHS)    Citomegalovirus    Sarampión  En algunos casos, la causa de la amigdalitis es sherine bacteria. La amigdalitis bacteriana también se conoce den faringitis estreptocócica. El tipo más común de bacteria que causa la amigdalitis es el estreptococo betahemolítico del rani A (GABS, por alex siglas en inglés). La bacteria se propaga a través de las pequeñas gotas en el aire. Laura sucede cuando alguien portador del virus tose o estornuda. También se puede transmitir al compartir alimentos o bebidas.   Síntomas de la amigdalitis  Los síntomas dependerán del tipo de amigdalitis que tenga. Hay varios tipos de amigdalitis.  Amigdalitis aguda  Los síntomas de luz marina tipo generalmente desaparecen en unos pocos días. Jose Maria pueden durar hasta 2 semanas. En algunos casos, los síntomas reaparecen luego de finalizar el tratamiento (amigdalitis recurrente aguda). Los síntomas incluyen los siguientes:     Fiebre    Dolor de garganta    Mal aliento    Dificultad para tragar    Pérdida de líquido  (deshidratación)    Irritación en los nódulos linfáticos en el hoang    Cansancio    Ronquidos, apnea del sueño o respiración por la boca    Manchas gely, pus o amígdalas jackson    Sarpullido tuttle en el cuerpo  Amigdalitis crónica  En luz marina verito, la infección o inflamación dura algunos meses. Los síntomas incluyen los siguientes:     Dolor de garganta prolongado    Mal aliento    Irritación prolongada en los nódulos linfáticos en el hoang    Bacterias y partículas que se acumulan en las amígdalas (llamados cálculos amigdalinos)  Absceso periamigdalino  Luz Marina es un tipo grave de amigdalitis. Ocurre cuando se forma margarita acumulación de pus (un absceso) alrededor de la amígdala. Necesita tratamiento de inmediato. [place] puede ayudar a evitar que el absceso bloquee las vías respiratorias. Los síntomas incluyen los siguientes:     Dolor de garganta intenso    Problemas para abrir la boca    Babeo    Voz apagada    Margarita amígdala puede verse más conrad  Diagnóstico de la amigdalitis  Si tiene síntomas, consulte a jean proveedor de atención médica principal o a un otorrinolaringólogo.   El proveedor le preguntará alex síntomas. También revisará los oídos, la nariz y la garganta para detectar cualquier inflamación e infección. Luego, el proveedor tomará margarita muestra de las amígdalas o de la parte posterior de la garganta. Esta muestra se puede verificar en el consultorio del proveedor para detectar margarita faringitis estreptocócica. Se llama examen rápido de estreptococo. Los resultados están listos en unos minutos. Jose Maria con esta prueba puede ciarra falsos negativos. Por lo tanto, es probable que el proveedor también envíe la muestra a un laboratorio para jean análisis (cultivo de garganta). Los resultados del laboratorio tomarán 24 horas o más. Jose Maria un cultivo de garganta es más preciso.   Tratamiento para la amigdalitis  El tratamiento dependerá de la causa de la amigdalitis. Si la causa es margarita bacteria, entonces jean proveedor puede  recetarle antibióticos para ayudarlo a recuperarse. Termine todo el medicamento, incluso si comienza a sentirse mejor.   La amigdalitis causada por un virus no se puede tratar con antibióticos. Susan tipo de infección a menudo desaparece por sí solo. Es posible que solo necesite atención domiciliaria, con reposo y líquidos. Siga estos consejos para aliviar los síntomas en el hogar:     Guarde mucho reposo.    [place] mucho líquido, den sopa, caldo y té con miel y ignacio.    Ingiera alimentos blandos den helado, puré de manzana y gelatinas de sabores.    Nilda gárgaras con agua salada tibia.    Use aerosoles o pastillas de venta aayush para el dolor de garganta.    [place] medicamentos de venta aayush para la fiebre y el dolor, según las indicaciones.    Use un humidificador de vapor frío para mantener el aire húmedo.  En casos graves, sherine persona puede estar deshidratada o tener sherine vía respiratoria bloqueada, y puede necesitar hospitalización.   Es posible que deba realizarse sherine cirugía para extirpar las amígdalas (amigdalectomía) si presenta cualquiera de los siguientes síntomas:     Amigdalitis crónica    Amigdalitis recurrente    Apnea obstructiva del sueño    Amigdalitis recurrente aguda  Si tiene un absceso periamigdalino, se puede realizar sherine cirugía para drenar el absceso.  Prevención de la amigdalitis  La amigdalitis en sí no se puede contagiar, rukhsana el virus y las bacterias que lo causan pueden transmitirse a otras personas.   Ninguna vacuna ni medicamento puede prevenir la amigdalitis. Estos consejos pueden ayudarlo a evitar que contagie o contraiga sherine enfermedad que puede causar amigdalitis:     Manténgase alejado de cualquier persona con amigdalitis o dolor de garganta tanto den sea posible.    No comparta utensilios, vasos, cepillos de dientes u otros objetos personales con nadie que tenga amigdalitis o dolor de garganta.    Lávese las marcio correctamente. Lávelas con agua y jabón con frecuencia. Use  desinfectante para marcio cuando no se las pueda estephania.    Cúbrase la boca al toser o estornudar.  Llame al 911  Llame al 911 si tiene cualquiera de los siguientes síntomas:     Dificultad para respirar o hablar    Problemas para tragar o abrir la boca    Boca y garganta inflamadas    Babeo    Cuándo debe llamar a jean proveedor de atención médica  Llame a jean proveedor de atención médica si presenta cualquiera de estos síntomas:      Fiebre de 100.4  F (38  C) o superior, o según le indique jean proveedor.    Un bulto que se agranda.    Dolor de garganta o dolor de hoang progresivos.    No puede abrir completamente la boca (llamado tétanos o trismus).    Rigidez en el hoang.    Hemorragia.    Dolor al tragar.    Sensación de estar enfermo.    Dolor de garganta por más de 2 días.      4758-7177 [employer]. All rights reserved. This information is not intended as a substitute for professional medical care. Always follow your healthcare professional's instructions.

## 2021-10-17 NOTE — PROGRESS NOTES
"    Assessment & Plan     Throat pain  Throat pain secondary to infection  Magic mouthwash for sore throat  Motrin for sore throat  - Streptococcus A Rapid Screen w/Reflex to PCR  - Group A Streptococcus PCR Throat Swab  - magic mouthwash (ENTER INGREDIENTS IN COMMENTS) suspension; Swish and spit 5-10 mLs in mouth every 6 hours as needed 30 ml of Benadryl (12.5 mg/5 ml), 60 ml Maalox, 30 ml Viscous Lidocaine  - ibuprofen (ADVIL/MOTRIN) 600 MG tablet; Take 1 tablet (600 mg) by mouth every 6 hours as needed for moderate pain    Lymphadenitis, acute  amox for infection  - amoxicillin (AMOXIL) 875 MG tablet; Take 1 tablet (875 mg) by mouth 2 times daily for 10 days    Tonsillitis  amox  - amoxicillin (AMOXIL) 875 MG tablet; Take 1 tablet (875 mg) by mouth 2 times daily for 10 days    Review of external notes as documented elsewhere in note         BMI:   Estimated body mass index is 29.29 kg/m  as calculated from the following:    Height as of 6/8/21: 1.499 m (4' 11\").    Weight as of this encounter: 65.8 kg (145 lb).       No follow-ups on file.    Joo Lane PA-C  [facility]   Mariaelena is a 60 year old who presents for the following health issues     HPI     Sore throat  Swollen glands    Review of Systems   Constitutional, HEENT, cardiovascular, pulmonary, gi and gu systems are negative, except as otherwise noted.      Objective    /70   Pulse 81   Temp 97.6  F (36.4  C) (Tympanic)   Resp 16   Wt 65.8 kg (145 lb)   LMP 12/02/2010   SpO2 98%   BMI 29.29 kg/m    Body mass index is 29.29 kg/m .  Physical Exam   GENERAL: healthy, alert and no distress  EYES: Eyes grossly normal to inspection, PERRL and conjunctivae and sclerae normal  HENT: normal cephalic/atraumatic, ear canals and TM's normal, oropharynx clear, oral mucous membranes moist, tonsillar hypertrophy and tonsillar erythema  NECK: cervical adenopathy anterior  MS: no gross musculoskeletal defects " noted, no edema  SKIN: no suspicious lesions or rashes    Results for orders placed or performed in visit on 10/17/21   Streptococcus A Rapid Screen w/Reflex to PCR     Status: Normal    Specimen: Throat; Swab   Result Value Ref Range    Group A Strep antigen Negative Negative

## 2021-11-28 ENCOUNTER — HOSPITAL ENCOUNTER (EMERGENCY)
Facility: CLINIC | Age: 61
Discharge: HOME | End: 2021-11-29
Attending: EMERGENCY MEDICINE | Admitting: EMERGENCY MEDICINE
Payer: COMMERCIAL

## 2021-11-28 DIAGNOSIS — N30.91 HEMORRHAGIC CYSTITIS: ICD-10-CM

## 2021-11-28 PROCEDURE — 85610 PROTHROMBIN TIME: CPT | Performed by: EMERGENCY MEDICINE

## 2021-11-28 PROCEDURE — 85027 COMPLETE CBC AUTOMATED: CPT | Performed by: EMERGENCY MEDICINE

## 2021-11-28 PROCEDURE — 81001 URINALYSIS AUTO W/SCOPE: CPT | Performed by: EMERGENCY MEDICINE

## 2021-11-28 PROCEDURE — 87186 SC STD MICRODIL/AGAR DIL: CPT | Performed by: EMERGENCY MEDICINE

## 2021-11-28 PROCEDURE — 51798 US URINE CAPACITY MEASURE: CPT

## 2021-11-28 PROCEDURE — 82374 ASSAY BLOOD CARBON DIOXIDE: CPT | Performed by: EMERGENCY MEDICINE

## 2021-11-28 PROCEDURE — 87086 URINE CULTURE/COLONY COUNT: CPT | Performed by: EMERGENCY MEDICINE

## 2021-11-28 PROCEDURE — 36415 COLL VENOUS BLD VENIPUNCTURE: CPT | Performed by: EMERGENCY MEDICINE

## 2021-11-28 PROCEDURE — 99285 EMERGENCY DEPT VISIT HI MDM: CPT | Mod: 25

## 2021-11-28 RX ORDER — KETOROLAC TROMETHAMINE 15 MG/ML
15 INJECTION, SOLUTION INTRAMUSCULAR; INTRAVENOUS ONCE
Status: COMPLETED | OUTPATIENT
Start: 2021-11-29 | End: 2021-11-29

## 2021-11-28 RX ORDER — HYDROMORPHONE HYDROCHLORIDE 1 MG/ML
0.5 INJECTION, SOLUTION INTRAMUSCULAR; INTRAVENOUS; SUBCUTANEOUS ONCE
Status: COMPLETED | OUTPATIENT
Start: 2021-11-29 | End: 2021-11-29

## 2021-11-29 ENCOUNTER — APPOINTMENT (OUTPATIENT)
Dept: CT IMAGING | Facility: CLINIC | Age: 61
End: 2021-11-29
Attending: EMERGENCY MEDICINE
Payer: COMMERCIAL

## 2021-11-29 VITALS
HEART RATE: 95 BPM | DIASTOLIC BLOOD PRESSURE: 71 MMHG | OXYGEN SATURATION: 98 % | BODY MASS INDEX: 29.89 KG/M2 | TEMPERATURE: 98.6 F | WEIGHT: 148 LBS | RESPIRATION RATE: 20 BRPM | SYSTOLIC BLOOD PRESSURE: 116 MMHG

## 2021-11-29 LAB
ALBUMIN UR-MCNC: 300 MG/DL
ANION GAP SERPL CALCULATED.3IONS-SCNC: 4 MMOL/L (ref 3–14)
APPEARANCE UR: ABNORMAL
BASOPHILS # BLD MANUAL: 0 10E3/UL (ref 0–0.2)
BASOPHILS NFR BLD MANUAL: 0 %
BILIRUB UR QL STRIP: NEGATIVE
BUN SERPL-MCNC: 18 MG/DL (ref 7–30)
CALCIUM SERPL-MCNC: 8.5 MG/DL (ref 8.5–10.1)
CHLORIDE BLD-SCNC: 105 MMOL/L (ref 94–109)
CO2 SERPL-SCNC: 26 MMOL/L (ref 20–32)
COLOR UR AUTO: ABNORMAL
CREAT SERPL-MCNC: 0.59 MG/DL (ref 0.52–1.04)
EOSINOPHIL # BLD MANUAL: 0.4 10E3/UL (ref 0–0.7)
EOSINOPHIL NFR BLD MANUAL: 3 %
ERYTHROCYTE [DISTWIDTH] IN BLOOD BY AUTOMATED COUNT: 12.2 % (ref 10–15)
GFR SERPL CREATININE-BSD FRML MDRD: >90 ML/MIN/1.73M2
GLUCOSE BLD-MCNC: 129 MG/DL (ref 70–99)
GLUCOSE UR STRIP-MCNC: NEGATIVE MG/DL
HCT VFR BLD AUTO: 41.2 % (ref 35–47)
HGB BLD-MCNC: 13.8 G/DL (ref 11.7–15.7)
HGB UR QL STRIP: ABNORMAL
INR PPP: 0.99 (ref 0.85–1.15)
KETONES UR STRIP-MCNC: NEGATIVE MG/DL
LEUKOCYTE ESTERASE UR QL STRIP: ABNORMAL
LYMPHOCYTES # BLD MANUAL: 4.8 10E3/UL (ref 0.8–5.3)
LYMPHOCYTES NFR BLD MANUAL: 34 %
MCH RBC QN AUTO: 30.4 PG (ref 26.5–33)
MCHC RBC AUTO-ENTMCNC: 33.5 G/DL (ref 31.5–36.5)
MCV RBC AUTO: 91 FL (ref 78–100)
MONOCYTES # BLD MANUAL: 1.3 10E3/UL (ref 0–1.3)
MONOCYTES NFR BLD MANUAL: 9 %
NEUTROPHILS # BLD MANUAL: 7.6 10E3/UL (ref 1.6–8.3)
NEUTROPHILS NFR BLD MANUAL: 54 %
NITRATE UR QL: NEGATIVE
PH UR STRIP: 6 [PH] (ref 5–7)
PLAT MORPH BLD: NORMAL
PLATELET # BLD AUTO: 343 10E3/UL (ref 150–450)
POTASSIUM BLD-SCNC: 4 MMOL/L (ref 3.4–5.3)
RBC # BLD AUTO: 4.54 10E6/UL (ref 3.8–5.2)
RBC MORPH BLD: NORMAL
RBC URINE: >182 /HPF
SODIUM SERPL-SCNC: 135 MMOL/L (ref 133–144)
SP GR UR STRIP: 1.02 (ref 1–1.03)
TRANSITIONAL EPI: 1 /HPF
UROBILINOGEN UR STRIP-MCNC: NORMAL MG/DL
WBC # BLD AUTO: 14 10E3/UL (ref 4–11)
WBC CLUMPS #/AREA URNS HPF: PRESENT /HPF
WBC URINE: >182 /HPF

## 2021-11-29 PROCEDURE — 250N000011 HC RX IP 250 OP 636: Performed by: EMERGENCY MEDICINE

## 2021-11-29 PROCEDURE — 96375 TX/PRO/DX INJ NEW DRUG ADDON: CPT

## 2021-11-29 PROCEDURE — 96361 HYDRATE IV INFUSION ADD-ON: CPT

## 2021-11-29 PROCEDURE — 250N000009 HC RX 250: Performed by: EMERGENCY MEDICINE

## 2021-11-29 PROCEDURE — 96365 THER/PROPH/DIAG IV INF INIT: CPT | Mod: 59

## 2021-11-29 PROCEDURE — 258N000003 HC RX IP 258 OP 636: Performed by: EMERGENCY MEDICINE

## 2021-11-29 PROCEDURE — 74178 CT ABD&PLV WO CNTR FLWD CNTR: CPT

## 2021-11-29 RX ORDER — CEPHALEXIN 500 MG/1
500 CAPSULE ORAL 3 TIMES DAILY
Qty: 21 CAPSULE | Refills: 0 | Status: SHIPPED | OUTPATIENT
Start: 2021-11-29 | End: 2021-12-06

## 2021-11-29 RX ORDER — IOPAMIDOL 755 MG/ML
91 INJECTION, SOLUTION INTRAVASCULAR ONCE
Status: COMPLETED | OUTPATIENT
Start: 2021-11-29 | End: 2021-11-29

## 2021-11-29 RX ORDER — PHENAZOPYRIDINE HYDROCHLORIDE 200 MG/1
200 TABLET, FILM COATED ORAL 3 TIMES DAILY
Qty: 9 TABLET | Refills: 0 | Status: SHIPPED | OUTPATIENT
Start: 2021-11-29 | End: 2021-12-02

## 2021-11-29 RX ORDER — CEFTRIAXONE 2 G/1
2 INJECTION, POWDER, FOR SOLUTION INTRAMUSCULAR; INTRAVENOUS ONCE
Status: COMPLETED | OUTPATIENT
Start: 2021-11-29 | End: 2021-11-29

## 2021-11-29 RX ADMIN — IOPAMIDOL 91 ML: 755 INJECTION, SOLUTION INTRAVENOUS at 01:02

## 2021-11-29 RX ADMIN — SODIUM CHLORIDE 1000 ML: 9 INJECTION, SOLUTION INTRAVENOUS at 00:01

## 2021-11-29 RX ADMIN — KETOROLAC TROMETHAMINE 15 MG: 15 INJECTION, SOLUTION INTRAMUSCULAR; INTRAVENOUS at 00:02

## 2021-11-29 RX ADMIN — SODIUM CHLORIDE 70 ML: 900 INJECTION INTRAVENOUS at 01:02

## 2021-11-29 RX ADMIN — HYDROMORPHONE HYDROCHLORIDE 0.5 MG: 1 INJECTION, SOLUTION INTRAMUSCULAR; INTRAVENOUS; SUBCUTANEOUS at 00:13

## 2021-11-29 RX ADMIN — CEFTRIAXONE SODIUM 2 G: 2 INJECTION, POWDER, FOR SOLUTION INTRAMUSCULAR; INTRAVENOUS at 02:08

## 2021-11-29 ASSESSMENT — ENCOUNTER SYMPTOMS
DYSURIA: 1
HEMATURIA: 1
FREQUENCY: 1

## 2021-11-29 NOTE — ED PROVIDER NOTES
History   Chief Complaint:  Dysuria and Hematuria     A  was used (Belizean).      Mariaelena Euceda is a 60 year old female with history of UTI and microscopic  hematuria who presents with hematuria and dysuria that started approximately 2 hours ago. States she has also had pain in her left sacrum/ buttock for the past 2 days. Pain has been constant. She has felt increased urge to urinate. She took Amoxicillin, but no medications for the pain. Denies fever, vomiting, and diarrhea. No history of kidney stones.    Urology Office Visit - 06/08/2021  Cystoscopy Note: After informed consent was obtained patient was prepped and draped in the standard fashion.  The flexible cystoscope was inserted into a normal appearing urethral meatus.  The urothelium was carefully examined and there were no tumors, masses, stones, foreign bodies, or other urothelial abnormalities noted.  Bilateral ureteral orifices were noted in the normal orthotopic position and both effluxed clear urine.  The cystoscope was retroflexed and the bladder neck was unremarkable.  The urethra was carefully examined upon removing the cystoscope and was unremarkable.  Patient tolerated the procedure without complications noted.       CT urogram images were reviewed by me and without obvious urologic abnormality or etiology of the microscopic hematuria     A/P: 60 year old F with microscopic hematuria s/p negative urologic evaluation     This has been long standing recommend nephrology evaluation     RTC 1 year, sooner if needed     Per Jacey Pearce MD [facility]    Review of Systems   Genitourinary: Positive for dysuria, frequency and hematuria.   All other systems reviewed and are negative.    Allergies:  The patient has no known allergies.     Medications:  Amoxicillin    Past Medical History:     UTI  Hematuria  Hyperlipidemia  COVID-19 infection  Overactive bladder      Past Surgical History:    Biopsy of left breast,  incisional  Cystoscopy  Hysterectomy  Laparoscopic cholecystectomy with cholangiograms  Salpingo-oophorectomy  Tubal ligation     Family History:    Mother: diabetes  Father: prostate cancer, diabetes  Brother: diabetes  Sister: diabetes    Social History:  The patient presents to the ED with her .   The patient is not COVID vaccinated according to the [facility].     Physical Exam     Patient Vitals for the past 24 hrs:   BP Temp Temp src Pulse Resp SpO2 Weight   11/29/21 0200 116/71 -- -- 95 -- 98 % --   11/28/21 2340 (!) 175/105 98.6  F (37  C) Temporal 104 20 100 % 67.1 kg (148 lb)     Physical Exam  VS: Reviewed per above  HENT: normal speech  EYES: sclera anicteric  CV: Rate as noted,  regular rhythm.   RESP: Effort normal. Breath sounds are normal bilaterally.  GI: moderate suprapubic tenderness without rebound/guarding, not distended.  NEURO: Alert, moving all extremities  MSK: No deformity of the extremities  SKIN: Warm and dry    Emergency Department Course     Imaging:  CT Urogram wo & w Contrast   Final Result   IMPRESSION:   1.  Mucosal hyperenhancement involving the bladder with diffuse bladder wall thickening and perivesical edema. Findings compatible with infectious or inflammatory etiology.      2.  No urinary collecting system dilatation or calculi.      3.  Symmetric contrast excretion from both kidneys without intrarenal collecting system filling defect or ureteral filling defect where well-opacified. Portions of the right ureter and left ureter remain unopacified but normal in caliber.      4.  No evidence for renal mass or CT evidence for pyelonephritis.      5.  Hepatic steatosis.      6.  Cholecystectomy with extrahepatic biliary prominence likely due to postcholecystectomy reservoir state in the absence of abnormal biliary markers.        Report per radiology    Laboratory:  Labs Ordered and Resulted from Time of ED Arrival to Time of ED Departure   BASIC METABOLIC PANEL - Abnormal        Result Value    Sodium 135      Potassium 4.0      Chloride 105      Carbon Dioxide (CO2) 26      Anion Gap 4      Urea Nitrogen 18      Creatinine 0.59      Calcium 8.5      Glucose 129 (*)     GFR Estimate >90     ROUTINE UA WITH MICROSCOPIC REFLEX TO CULTURE - Abnormal    Color Urine Light Red (*)     Appearance Urine Cloudy (*)     Glucose Urine Negative      Bilirubin Urine Negative      Ketones Urine Negative      Specific Gravity Urine 1.021      Blood Urine Large (*)     pH Urine 6.0      Protein Albumin Urine 300  (*)     Urobilinogen Urine Normal      Nitrite Urine Negative      Leukocyte Esterase Urine Large (*)     WBC Clumps Urine Present (*)     RBC Urine >182 (*)     WBC Urine >182 (*)     Transitional Epithelials Urine 1     CBC WITH PLATELETS AND DIFFERENTIAL - Abnormal    WBC Count 14.0 (*)     RBC Count 4.54      Hemoglobin 13.8      Hematocrit 41.2      MCV 91      MCH 30.4      MCHC 33.5      RDW 12.2      Platelet Count 343     INR - Normal    INR 0.99     DIFFERENTIAL    % Neutrophils 54      % Lymphocytes 34      % Monocytes 9      % Eosinophils 3      % Basophils 0      Absolute Neutrophils 7.6      Absolute Lymphocytes 4.8      Absolute Monocytes 1.3      Absolute Eosinophils 0.4      Absolute Basophils 0.0      RBC Morphology Confirmed RBC Indices      Platelet Assessment        Value: Automated Count Confirmed. Platelet morphology is normal.   URINE CULTURE      Emergency Department Course:  Reviewed:  I reviewed nursing notes, vitals, past medical history, Care Everywhere and [facility]    Assessments:  2350 I obtained history and examined the patient as noted above.   0007 I rechecked the patient and performed bedside ultrasound.  0201 I rechecked the patient and explained findings.     Interventions:  0001 0.9% sodium chloride BOLUS 1000mL IV   0002 Toradol 15mg IV  0013 Dilaudid 0.5mg IV  0208 Rocephin 2g IV    Disposition:  The patient was discharged to home.     Impression & Plan        Medical Decision Making:  Patient presents to the ER for evaluation of 2 hours of significant suprapubic discomfort and gross hematuria as well as urinary urgency/dysuria.  On arrival vital signs are notable for elevated blood pressure 175/105.  She is afebrile.  On exam she has moderate suprapubic tenderness without peritoneal signs.  She does appear quite uncomfortable.  She was given IV analgesia with improvement in symptoms.  Labs reveal evidence of leukocytosis of 14.  Renal function wnl.  Urinalysis notable for large leukocyte esterase, hematuria, pyuria.   CT urogram today shows inflamed bladder wall. She was given empiric antibiotics awaiting urine culture.  I suspect she has hemorrhagic cystitis at this time.  I do not suspect sepsis at this time.  Patient had seen urology in the past for microscopic hematuria with negative cystoscopy.  I discussed that she should follow-up with them if not improving on antibiotics.  Return precautions discussed prior to discharge.    Diagnosis:    ICD-10-CM    1. Hemorrhagic cystitis  N30.91        Discharge Medications:  New Prescriptions    CEPHALEXIN (KEFLEX) 500 MG CAPSULE    Take 1 capsule (500 mg) by mouth 3 times daily for 7 days    PHENAZOPYRIDINE (PYRIDIUM) 200 MG TABLET    Take 1 tablet (200 mg) by mouth 3 times daily for 3 days       Scribe Disclosure:  NANO, Alla Castillo, am serving as a scribe at 11:42 PM on 11/28/2021 to document services personally performed by Shaq Vinson MD based on my observations and the provider's statements to me.      Shaq Vinson MD  11/29/21 0254

## 2021-11-30 LAB — BACTERIA UR CULT: ABNORMAL

## 2022-03-03 NOTE — PROGRESS NOTES
"  Assessment & Plan     Mariaelena was seen today for knee pain.    History obtained with the help of a     Diagnoses and all orders for this visit:    Acute pain of right knee  -     Orthopedic  Referral; Future  -     nabumetone (RELAFEN) 500 MG tablet; Take 1 tablet (500 mg) by mouth 2 times daily as needed for moderate pain  -     XR Knee Right 3 Views; Future  There is a possibility of mild meniscal tear or soft tissue of the knee joint are injured  She has pain and swelling  I referred her to orthopedics  I prescribed nabumetone and advised to avoid over-the-counter NSAIDs with that   Okay to take Tylenol      Dyspepsia  -     famotidine (PEPCID) 20 MG tablet; Take 1 tablet (20 mg) by mouth 2 times daily  Advised to take famotidine while she is on nabumetone to avoid stomach upset problem    Other orders  -     REVIEW OF HEALTH MAINTENANCE PROTOCOL ORDERS           BMI:   Estimated body mass index is 30.09 kg/m  as calculated from the following:    Height as of this encounter: 1.499 m (4' 11\").    Weight as of this encounter: 67.6 kg (149 lb).       See Patient Instructions  Patient Instructions   You are due for mammogram.  Please call the following number to make appointment :  [phone]  It is located in [address]    There is this is a new shingles vaccine available called shingrex  It is a series of 2 shots 2-6 months apart.  Considered more than 90% effective.  Please go to any pharmacy to get the  vaccine  '  XR today  Take nabumetone 500 mg twice a day as needed for pain  Do not take other NSAIDS with this like ibuprofen , motrin and aleve.  Ok to take tylenol 500 mg every 6 hours as needed   Make appointment with orthopedics  Follow up for annual exam  Seek sooner medical attention if there is any worsening of symptoms or problems.        No follow-ups on file.    Janell Zaidi MD  [facility]    Justin Ferrell is a 61 year old who presents for " "the following health issues     HPI     RIGHT KNEE PAIN -  PT C/O PAIN BEHIND RIGHT KNEE X3 WEEKS; SWOLLEN; FEELS A PULLING SENSATION WHEN SHE WALKS; SMALL BUMP IN BACK OF KNEE, SHE HEARD A NOISE ONE DAY GETTING INTO A CAR AND HAS HAD PAIN SINCE  2 WEEKS ago   Sometime she takes ibuprofen          Review of Systems   Constitutional, HEENT, cardiovascular, pulmonary, GI, , musculoskeletal, neuro, skin, endocrine and psych systems are negative, except as otherwise noted.      Objective    /81 (BP Location: Right arm, Cuff Size: Adult Regular)   Pulse 76   Temp 98.4  F (36.9  C) (Tympanic)   Resp 12   Ht 1.499 m (4' 11\")   Wt 67.6 kg (149 lb)   LMP 12/02/2010   SpO2 96%   BMI 30.09 kg/m    Body mass index is 30.09 kg/m .  Physical Exam   GENERAL: healthy, alert and no distress  PSYCH: mentation appears normal, affect normal/bright  Her right knee is slightly more swollen compared to the left knee  There is mild effusion  She is slightly tender on the lateral aspect of the knee joint and also slightly tender in posterior part  Able to walk and bear weight  No signs of any infection        "

## 2022-03-04 ENCOUNTER — OFFICE VISIT (OUTPATIENT)
Dept: FAMILY MEDICINE | Facility: CLINIC | Age: 62
End: 2022-03-04
Payer: COMMERCIAL

## 2022-03-04 ENCOUNTER — ANCILLARY PROCEDURE (OUTPATIENT)
Dept: GENERAL RADIOLOGY | Facility: CLINIC | Age: 62
End: 2022-03-04
Attending: INTERNAL MEDICINE
Payer: COMMERCIAL

## 2022-03-04 VITALS
TEMPERATURE: 98.4 F | WEIGHT: 149 LBS | DIASTOLIC BLOOD PRESSURE: 81 MMHG | OXYGEN SATURATION: 96 % | RESPIRATION RATE: 12 BRPM | HEIGHT: 59 IN | SYSTOLIC BLOOD PRESSURE: 137 MMHG | HEART RATE: 76 BPM | BODY MASS INDEX: 30.04 KG/M2

## 2022-03-04 DIAGNOSIS — Z12.31 VISIT FOR SCREENING MAMMOGRAM: ICD-10-CM

## 2022-03-04 DIAGNOSIS — R10.13 DYSPEPSIA: ICD-10-CM

## 2022-03-04 DIAGNOSIS — M25.561 ACUTE PAIN OF RIGHT KNEE: Primary | ICD-10-CM

## 2022-03-04 DIAGNOSIS — M25.561 ACUTE PAIN OF RIGHT KNEE: ICD-10-CM

## 2022-03-04 PROCEDURE — T1013 SIGN LANG/ORAL INTERPRETER: HCPCS | Mod: U3

## 2022-03-04 PROCEDURE — 73562 X-RAY EXAM OF KNEE 3: CPT | Mod: RT | Performed by: RADIOLOGY

## 2022-03-04 PROCEDURE — 99213 OFFICE O/P EST LOW 20 MIN: CPT | Performed by: INTERNAL MEDICINE

## 2022-03-04 RX ORDER — FAMOTIDINE 20 MG/1
20 TABLET, FILM COATED ORAL 2 TIMES DAILY
Qty: 30 TABLET | Refills: 1 | Status: SHIPPED | OUTPATIENT
Start: 2022-03-04 | End: 2022-05-16

## 2022-03-04 RX ORDER — NABUMETONE 500 MG/1
500 TABLET, FILM COATED ORAL 2 TIMES DAILY PRN
Qty: 30 TABLET | Refills: 1 | Status: SHIPPED | OUTPATIENT
Start: 2022-03-04 | End: 2022-05-16

## 2022-03-04 ASSESSMENT — PAIN SCALES - GENERAL: PAINLEVEL: SEVERE PAIN (6)

## 2022-03-04 NOTE — NURSING NOTE
Called and spoke to patient in Urdu - relayed message from Dr. Zaidi in regards of X-ray results. Patient agreed to see Ortho next week.    Padmini BECK MA on 3/4/2022 at 4:46 PM

## 2022-03-04 NOTE — PATIENT INSTRUCTIONS
You are due for mammogram.  Please call the following number to make appointment :  [phone]  It is located in [address]    There is this is a new shingles vaccine available called shingrex  It is a series of 2 shots 2-6 months apart.  Considered more than 90% effective.  Please go to any pharmacy to get the  vaccine  '  XR today  Take nabumetone 500 mg twice a day as needed for pain  Do not take other NSAIDS with this like ibuprofen , motrin and aleve.  Ok to take tylenol 500 mg every 6 hours as needed   Make appointment with orthopedics  Follow up for annual exam  Seek sooner medical attention if there is any worsening of symptoms or problems.

## 2022-03-04 NOTE — LETTER
March 7, 2022      Mariaelena Euceda  [address] [facility] [address]        Bo Ferrell,     This is to inform you regarding your test result.     XR did not show any fracture   Only mild effusion   Keep appointment with orthopedics.     Sincerely,       Dr.Nasima Dejuan MD,FACP       Resulted Orders   XR Knee Right 3 Views    Narrative    KNEE RIGHT THREE VIEWS  March 4, 2022 11:05 AM     HISTORY: Acute pain of right knee.    COMPARISON: None.      Impression    IMPRESSION: Joint spaces appear fairly well preserved with some very  mild marginal osteophyte formation in all three compartments. No  fracture. Possible small joint effusion.    JAS CANADA MD         SYSTEM ID:  [id number]

## 2022-03-05 NOTE — RESULT ENCOUNTER NOTE
Please notify patient by sending following letter with copy of test results      Bo Ferrell,    This is to inform you regarding your test result.    XR did not show any fracture   Only mild effusion  Keep appointment with orthopedics.    Sincerely,      Dr.Nasima Dejuan MD,FACP

## 2022-03-09 ENCOUNTER — OFFICE VISIT (OUTPATIENT)
Dept: ORTHOPEDICS | Facility: CLINIC | Age: 62
End: 2022-03-09
Attending: INTERNAL MEDICINE
Payer: COMMERCIAL

## 2022-03-09 VITALS
SYSTOLIC BLOOD PRESSURE: 132 MMHG | BODY MASS INDEX: 30.04 KG/M2 | HEIGHT: 59 IN | WEIGHT: 149 LBS | DIASTOLIC BLOOD PRESSURE: 78 MMHG

## 2022-03-09 DIAGNOSIS — M17.11 PRIMARY OSTEOARTHRITIS OF RIGHT KNEE: Primary | ICD-10-CM

## 2022-03-09 DIAGNOSIS — M25.561 ACUTE PAIN OF RIGHT KNEE: ICD-10-CM

## 2022-03-09 PROCEDURE — 99203 OFFICE O/P NEW LOW 30 MIN: CPT | Performed by: ORTHOPAEDIC SURGERY

## 2022-03-09 ASSESSMENT — KOOS JR
TWISING OR PIVOTING ON KNEE: MILD
KOOS JR SCORING: 79.91
HOW SEVERE IS YOUR KNEE STIFFNESS AFTER FIRST WAKING IN MORNING: MILD
GOING UP OR DOWN STAIRS: MILD

## 2022-03-09 NOTE — PATIENT INSTRUCTIONS
[address] [facility] PHYSICAL THERAPY  [address] [facility] [address], [address] - Phone: [phone]  Fax: [phone] SUMMARY FOR PHYSICAL THERAPY          Patient Name: Edita Low : 1945   Treatment/Medical Diagnosis: Right knee pain [M25.561]   Referral Source: Lulú Hyatt MD Start of Care [facility] [place]): 2021   Prior Hospitalization: See Medical History Provider #: [id number]   Medications: Verified on Patient Summary List   Visits from [facility]: 18 Missed Visits: 1     GOALS  1. Pt will demonstrate knee extension AROM to at least 2 deg from neutral to improve stance phase. -NOT MET: 7 deg from neutral  2. Pt will be independent with HEP at D/C for self management. -MET  3. Pt will report minimal/no difficulty with descending stairs. to decrease functional limitations. - moderate difficulty with descending  SUMMARY OF TREATMENT  Patient's POC has consisted of therex, therapeutic activities, manual therapy prn, modalities prn, pt. education, and a comprehensive HEP. Treatment strategies used to address functional mobility deficits, ROM deficits, strength deficits, analyze and address soft tissue restrictions, analyze and cue movement patterns, analyze and modify body mechanics/ergonomics, assess and modify postural abnormalities and instruct in home and community integration. Key Functional Changes/Progress: Vicenta reports 80% improvement since start of care and wishing to discharge today. She has improved a great deal since starting physical therapy. She is still demonstrating decreased ROM and ability to descending stairs. She is able to perform exercises without cuing and was recommended to continue them.  She reported she will be joining [facility] and wellness center soon to continue her progress.      Current Pain: 0/10  Lowest Pain: 0/10  Worst Pain: 2/10        AROM   Hip Flexion (0-120) NT   Knee Flexion (0-135) 105     Extension (0)  [facility] LOCATIONS     MONDAY / FRIDAY - [facility] WEDNESDAY - MELISSA   [address] [place] [address] [address]   [phone] / [phone] / [phone]       THURSDAY - [facility] SCHEDULE SURGERY: [phone]   [address] APPOINTMENTS: [phone]   [place] [address] AFTER HOURS: [phone]   [phone] / [phone] BILLING QUESTIONS: [phone]      Follow as needed    Body Mass Index (BMI)  Many things can cause foot and ankle problems. Foot structure, activity level, foot mechanics and injuries are common causes of pain.  One very important issue that often goes unmentioned, is body weight.  Extra weight can cause increased stress on muscles, ligaments, bones and tendons.  Sometimes just a few extra pounds is all it takes to put one over her/his threshold. Without reducing that stress, it can be difficult to alleviate pain. Some people are uncomfortable addressing this issue, but we feel it is important for you to think about it. As Foot &  Ankle specialists, our job is addressing the lower extremity problem and possible causes. Regarding extra body weight, we encourage patients to discuss diet and weight management plans with their primary care doctors. It is this team approach that gives you the best opportunity for pain relief and getting you back on your feet.      CAPSULITIS / METATARSALGIA  All joints in the body are surrounded by a capsule, or a covering of soft tissue and ligaments. The capsule holds bones together and secretes joint fluid to help lubricate the joint.  If a joint capsule is exposed to excessive force, it can develop microscopic tears and become inflamed. This commonly occurs in the foot due to mild variation in anatomy. Hammertoes, bunions, irregular bone length, joint immobility, etc. can all lead to excessive force on the joint. Capsule injury can also occur due to repetitive stress  Ankle Plantarflexion (0-50) WFL      Dorsiflexion (0-20)  WFL     Current Limitations: Descending Stairs (2/10) and Reports of Instability    Assessments/Recommendations: Discontinue therapy. Progressing towards or have reached established goals. If you have any questions/comments please contact us directly at [phone]. Thank you for allowing us to assist in the care of your patient. Therapist Signature:  Mandy Palmer DPT Date: 3/9/2022   Reporting Period: 12/22/2021 - 3/21/2022 Time: 0:36 PM      Certification Period: 12/22/2021 - 3/09/2022       NOTE TO PHYSICIAN:  PLEASE COMPLETE THE ORDERS BELOW AND FAX TO   [facility]: [phone]. If you are unable to process this request in 24 hours please contact our office: [phone].    ___ I have read the above report and request that my patient be discharged from therapy.      Physician Signature:        Date:       Time:    Rebekah Landers MD from exercise, insufficient support from shoes, excessive bare foot walking and excessive weight.      Conservative treatments include ice, rest from the aggravating activity, weight loss, orthotic inserts, improving shoes and shoe modifications. Appropriate shoes will protect the inflamed tissue improving the chances of healing. Avoidance of standing or walking barefoot, including around the house, is necessary to allow healing. Casts are sometimes used for more aggressive protection.  NSAIDs such as Advil are also used to help with pain and decreasing inflammation. If pain continues over a period of weeks with continuous rest and icing, Corticosteroid injections can be a treatment option to try and help decrease inflammation.    Surgery is often necessary to correct the underlying structural problem. Surgery might include shortening an excessively long bone, repairing bunion or hammertoe, lengthening a tight Achilles  tendon, etc. These are same day surgeries that might be pursued if more conservative measures fail to provide relief.      The inflamed joint capsule has the potential to completely tear. This will allow the toe to drift off the ground, curving toward the other toes. The involved toe may under or overlap the adjacent toes as drift continues. The pain may improve after the joint tears or this new position will be permanent. Surgery can address the toe alignment. Your goal of treating capsulitis is to avoid this scenario.

## 2022-03-09 NOTE — LETTER
3/9/2022         RE: Mariaelena Euceda  [address] [facility] [address]        Dear Colleague,    Thank you for referring your patient, Mariaelena Euceda, to the [facility] [place]. Please see a copy of my visit note below.    HISTORY OF PRESENT ILLNESS:    Mariaelena Euceda is a 61 year old female who is seen in consultation at the request of Dr. Zaidi for right knee pain. Onset of pain 3 weeks ago. She denies injury or trauma to the area. Patient currently working, lots of walking.     Present symptoms: Right knee pain located to posterior aspect of the knee. Increased pain with walking. She reports pulling sensation.  Swelling of the right knee, she reports the swelling has remained the same.  She reports the area is somewhat bothersome.  She reports today the pain feels pulsating.   Treatments tried to this point: nabumetone- prn  Orthopedic PMH: none     Past Medical History:   Diagnosis Date     Urinary tract infection, site not specified        Past Surgical History:   Procedure Laterality Date     BIOPSY OF BREAST, INCISIONAL  2003    left breast, benign     CYSTOSCOPY  06/08/2021    Cystoscopy in office Dr Pearce     HYSTERECTOMY, PAP NO LONGER INDICATED       LAPAROSCOPIC CHOLECYSTECTOMY WITH CHOLANGIOGRAMS N/A 1/8/2017    Procedure: LAPAROSCOPIC CHOLECYSTECTOMY WITH CHOLANGIOGRAMS;  Surgeon: James Umana MD;  Location: [facility] OR     SALPINGO OOPHORECTOMY,R/L/RANDELL       TUBAL LIGATION  1998       Family History   Problem Relation Age of Onset     Diabetes Mother      Prostate Cancer Father      Diabetes Father      Diabetes Brother      Diabetes Sister      Breast Cancer No family hx of      Cancer - colorectal No family hx of        Social History     Socioeconomic History     Marital status:      Spouse name: Not on file     Number of children: 5     Years of education: Not on file     Highest education level: Not on file   Occupational History      Employer: [employer]     Comment: , full time   Tobacco Use     Smoking status: Never Smoker     Smokeless tobacco: Never Used   Substance and Sexual Activity     Alcohol use: No     Alcohol/week: 0.0 standard drinks     Drug use: No     Sexual activity: Yes     Partners: Male     Birth control/protection: Surgical     Comment: Pt had TL done 1998   Other Topics Concern      Service Not Asked     Blood Transfusions No     Caffeine Concern Not Asked     Occupational Exposure Not Asked     Hobby Hazards Not Asked     Sleep Concern No     Stress Concern No     Weight Concern Not Asked     Special Diet Not Asked     Back Care Not Asked     Exercise Not Asked     Bike Helmet Not Asked     Seat Belt Not Asked     Self-Exams Not Asked     Parent/sibling w/ CABG, MI or angioplasty before 65F 55M? Not Asked   Social History Narrative     Not on file     Social Determinants of Health     Financial Resource Strain: Not on file   Food Insecurity: Not on file   Transportation Needs: Not on file   Physical Activity: Not on file   Stress: Not on file   Social Connections: Not on file   Intimate Partner Violence: Not on file   Housing Stability: Not on file       Current Outpatient Medications   Medication Sig Dispense Refill     famotidine (PEPCID) 20 MG tablet Take 1 tablet (20 mg) by mouth 2 times daily 30 tablet 1     magic mouthwash (ENTER INGREDIENTS IN COMMENTS) suspension Swish and spit 5-10 mLs in mouth every 6 hours as needed 30 ml of Benadryl (12.5 mg/5 ml), 60 ml Maalox, 30 ml Viscous Lidocaine 120 mL 0     nabumetone (RELAFEN) 500 MG tablet Take 1 tablet (500 mg) by mouth 2 times daily as needed for moderate pain 30 tablet 1       No Known Allergies    REVIEW OF SYSTEMS:  CONSTITUTIONAL:  NEGATIVE for fever, chills, change in weight  INTEGUMENTARY/SKIN:  NEGATIVE for worrisome rashes, moles or lesions  EYES:  NEGATIVE for vision changes or irritation  ENT/MOUTH:  NEGATIVE for ear,  mouth and throat problems  RESP:  NEGATIVE for significant cough or SOB  BREAST:  NEGATIVE for masses, tenderness or discharge  CV:  NEGATIVE for chest pain, palpitations or peripheral edema  GI:  NEGATIVE for nausea, abdominal pain, heartburn, or change in bowel habits  :  Negative   MUSCULOSKELETAL:  See HPI above  NEURO:  NEGATIVE for weakness, dizziness or paresthesias  ENDOCRINE:  NEGATIVE for temperature intolerance, skin/hair changes  HEME/ALLERGY/IMMUNE:  NEGATIVE for bleeding problems  PSYCHIATRIC:  NEGATIVE for changes in mood or affect      PHYSICAL EXAM:  LMP 12/02/2010   There is no height or weight on file to calculate BMI.   GENERAL APPEARANCE: healthy, alert and no distress   HEENT: No apparent thyroid megaly. Clear sclera with normal ocular movement  RESPIRATORY: No labored breathing  SKIN: no suspicious lesions or rashes  NEURO: Normal strength and tone, mentation intact and speech normal  VASCULAR: Good pulses, and capillary refill   LYMPH: no lymphadenopathy   PSYCH:  mentation appears normal and affect normal/bright    MUSCULOSKELETAL:  Not in acute distress  Able to get up from sitting quite readily  No limping noted when she walks  Full symmetrical range of motion of both knees  Subtle swelling, right knee  Minimal crepitus, right patellofemoral joint  More noticeable mild to moderate crepitus of the left knee  However, with a palpation and translation of the patella, right knee is tender much more so than the left   No medial lateral joint line pain  Intact ligaments throughout  Motor function is grossly intact  Lisandro's is negative  Calf is not swollen   circulation is intact  Skin is intact         ASSESSMENT:    Early DJD, right knee especially the patellofemoral joint  Asymptomatic early DJD, left knee    PLAN:  The x-ray images of March 4, 2022 were visualized.  Mild subchondral sclerosis of the patella and minimal bone spurs at the patellofemoral joint and lateral compartment were  noted to be present.  Implication of these findings along with presence of patellofemoral tenderness with compression and translation were explained.  Nature of osteoarthritis was informed with help of a .    Given the fact that she is improving and quite functional, no immediate intervention was felt to be necessary.  Symptomatic treatments at this point with activity modifications, over-the-counter medications and knee sleeve.    Information regarding osteoarthritis was provided for review.    We talked about the remote possibility of cortisone injection in the future if the pain becomes much more noticeable or intolerable.    All the questions were answered.    Imaging Interpretation:     Recent Results (from the past 744 hour(s))   XR Knee Right 3 Views    Narrative    KNEE RIGHT THREE VIEWS  March 4, 2022 11:05 AM     HISTORY: Acute pain of right knee.    COMPARISON: None.      Impression    IMPRESSION: Joint spaces appear fairly well preserved with some very  mild marginal osteophyte formation in all three compartments. No  fracture. Possible small joint effusion.    JAS CANADA MD         SYSTEM ID:  [id number]           Kyle Chávez MD  Department of Orthopedic Surgery        Disclaimer: This note consists of symbols derived from keyboarding, dictation and/or voice recognition software. As a result, there may be errors in the script that have gone undetected. Please consider this when interpreting information found in this chart.        Again, thank you for allowing me to participate in the care of your patient.        Sincerely,        Kyle Chávez MD

## 2022-03-09 NOTE — PATIENT INSTRUCTIONS
"We discussed the problem of arthritis today. Arthritis means that the joint surfaces that were once smooth are getting rough. When the rough joint surfaces are loaded and gliding, you often have pain, swelling, catching/popping, and locking type of symptoms. It can be episodic or constant. Even though the process is slow developing and chronic in nature, the symptoms can come on rather suddenly sometimes triggered by an injury or at other times without any identifiable event.    Typically, even with arthritis, most people can  function quite well with a common sense management which include: activity modifications, OTC medications (e.g.. Acetaminophen and Ibuprofen or Naproxen), icing, stretching and muscle strengthening. In general, staying active helps because it will help maintaining joint flexibility and muscle strength although \"overdoing\" and doing repetitively loading activities could worsen the symptoms. If you carry extra weight, losing weight should be a part of management of arthritis. I recommend diet along with gentle aerobic exercises such as walking, elliptical, swimming and stationary biking. Activities like jumping, pivoting, squatting, lunging, stair climbing, and kneeling are better to be avoided.    You can also try over- the- counter braces (especially for the knee arthritis) but since the problem is an internal issue, it is not always helpful.  Formal PT is not always necessary but can be helpful if you are not sure about what exercises to do. A discussion with a dietician can be very helpful if you decided to include weight loss as a part of the treatment.  If these measures are not effective, we often resort to the injection treatment, either cortisone or viscous joint supplement. It has been shown that viscous joint injections are not very effective if arthritis is advanced and at this point is approved only for knee arthritis. The potential benefit from injections are not permanent and for " that reason they can be repeated at a reasonable frequency. The duration of benefit is impossible to predict. Sometimes, it does not provide any noticeable benefit at all.    As you can imagine, surgical intervention is not the first line of management. It is important to remember that even with surgery one cannot cure arthritis unless joint replacement is considered. As was the case with the injection treatment, the response from arthroscopic surgeries  is unpredictable since we cannot make the joint surfaces back to perfectly smooth. It would be an option for someone who has tried all appropriate non-operative treatment modalities and is not quite ready for replacement type of permanent procedure. Arthroscopic procedures are more applicable to the knees, the shoulders and the ankles as opposed to the hips and fingers.

## 2022-03-09 NOTE — PROGRESS NOTES
HISTORY OF PRESENT ILLNESS:    Mariaelena Euceda is a 61 year old female who is seen in consultation at the request of Dr. Zaidi for right knee pain. Onset of pain 3 weeks ago. She denies injury or trauma to the area. Patient currently working, lots of walking.     Present symptoms: Right knee pain located to posterior aspect of the knee. Increased pain with walking. She reports pulling sensation.  Swelling of the right knee, she reports the swelling has remained the same.  She reports the area is somewhat bothersome.  She reports today the pain feels pulsating.   Treatments tried to this point: nabumetone- prn  Orthopedic PMH: none     Past Medical History:   Diagnosis Date     Urinary tract infection, site not specified        Past Surgical History:   Procedure Laterality Date     BIOPSY OF BREAST, INCISIONAL  2003    left breast, benign     CYSTOSCOPY  06/08/2021    Cystoscopy in office Dr Pearce     HYSTERECTOMY, PAP NO LONGER INDICATED       LAPAROSCOPIC CHOLECYSTECTOMY WITH CHOLANGIOGRAMS N/A 1/8/2017    Procedure: LAPAROSCOPIC CHOLECYSTECTOMY WITH CHOLANGIOGRAMS;  Surgeon: James Umana MD;  Location: [facility] OR     SALPINGO OOPHORECTOMY,R/L/RANDELL       TUBAL LIGATION  1998       Family History   Problem Relation Age of Onset     Diabetes Mother      Prostate Cancer Father      Diabetes Father      Diabetes Brother      Diabetes Sister      Breast Cancer No family hx of      Cancer - colorectal No family hx of        Social History     Socioeconomic History     Marital status:      Spouse name: Not on file     Number of children: 5     Years of education: Not on file     Highest education level: Not on file   Occupational History     Employer: [employer]     Comment: , full time   Tobacco Use     Smoking status: Never Smoker     Smokeless tobacco: Never Used   Substance and Sexual Activity     Alcohol use: No     Alcohol/week: 0.0 standard drinks     Drug use: No     Sexual  activity: Yes     Partners: Male     Birth control/protection: Surgical     Comment: Pt had TL done 1998   Other Topics Concern      Service Not Asked     Blood Transfusions No     Caffeine Concern Not Asked     Occupational Exposure Not Asked     Hobby Hazards Not Asked     Sleep Concern No     Stress Concern No     Weight Concern Not Asked     Special Diet Not Asked     Back Care Not Asked     Exercise Not Asked     Bike Helmet Not Asked     Seat Belt Not Asked     Self-Exams Not Asked     Parent/sibling w/ CABG, MI or angioplasty before 65F 55M? Not Asked   Social History Narrative     Not on file     Social Determinants of Health     Financial Resource Strain: Not on file   Food Insecurity: Not on file   Transportation Needs: Not on file   Physical Activity: Not on file   Stress: Not on file   Social Connections: Not on file   Intimate Partner Violence: Not on file   Housing Stability: Not on file       Current Outpatient Medications   Medication Sig Dispense Refill     famotidine (PEPCID) 20 MG tablet Take 1 tablet (20 mg) by mouth 2 times daily 30 tablet 1     magic mouthwash (ENTER INGREDIENTS IN COMMENTS) suspension Swish and spit 5-10 mLs in mouth every 6 hours as needed 30 ml of Benadryl (12.5 mg/5 ml), 60 ml Maalox, 30 ml Viscous Lidocaine 120 mL 0     nabumetone (RELAFEN) 500 MG tablet Take 1 tablet (500 mg) by mouth 2 times daily as needed for moderate pain 30 tablet 1       No Known Allergies    REVIEW OF SYSTEMS:  CONSTITUTIONAL:  NEGATIVE for fever, chills, change in weight  INTEGUMENTARY/SKIN:  NEGATIVE for worrisome rashes, moles or lesions  EYES:  NEGATIVE for vision changes or irritation  ENT/MOUTH:  NEGATIVE for ear, mouth and throat problems  RESP:  NEGATIVE for significant cough or SOB  BREAST:  NEGATIVE for masses, tenderness or discharge  CV:  NEGATIVE for chest pain, palpitations or peripheral edema  GI:  NEGATIVE for nausea, abdominal pain, heartburn, or change in bowel habits  :   Negative   MUSCULOSKELETAL:  See HPI above  NEURO:  NEGATIVE for weakness, dizziness or paresthesias  ENDOCRINE:  NEGATIVE for temperature intolerance, skin/hair changes  HEME/ALLERGY/IMMUNE:  NEGATIVE for bleeding problems  PSYCHIATRIC:  NEGATIVE for changes in mood or affect      PHYSICAL EXAM:  LMP 12/02/2010   There is no height or weight on file to calculate BMI.   GENERAL APPEARANCE: healthy, alert and no distress   HEENT: No apparent thyroid megaly. Clear sclera with normal ocular movement  RESPIRATORY: No labored breathing  SKIN: no suspicious lesions or rashes  NEURO: Normal strength and tone, mentation intact and speech normal  VASCULAR: Good pulses, and capillary refill   LYMPH: no lymphadenopathy   PSYCH:  mentation appears normal and affect normal/bright    MUSCULOSKELETAL:  Not in acute distress  Able to get up from sitting quite readily  No limping noted when she walks  Full symmetrical range of motion of both knees  Subtle swelling, right knee  Minimal crepitus, right patellofemoral joint  More noticeable mild to moderate crepitus of the left knee  However, with a palpation and translation of the patella, right knee is tender much more so than the left   No medial lateral joint line pain  Intact ligaments throughout  Motor function is grossly intact  Lisandro's is negative  Calf is not swollen   circulation is intact  Skin is intact         ASSESSMENT:    Early DJD, right knee especially the patellofemoral joint  Asymptomatic early DJD, left knee    PLAN:  The x-ray images of March 4, 2022 were visualized.  Mild subchondral sclerosis of the patella and minimal bone spurs at the patellofemoral joint and lateral compartment were noted to be present.  Implication of these findings along with presence of patellofemoral tenderness with compression and translation were explained.  Nature of osteoarthritis was informed with help of a .    Given the fact that she is improving and quite  functional, no immediate intervention was felt to be necessary.  Symptomatic treatments at this point with activity modifications, over-the-counter medications and knee sleeve.    Information regarding osteoarthritis was provided for review.    We talked about the remote possibility of cortisone injection in the future if the pain becomes much more noticeable or intolerable.    All the questions were answered.    Imaging Interpretation:     Recent Results (from the past 744 hour(s))   XR Knee Right 3 Views    Narrative    KNEE RIGHT THREE VIEWS  March 4, 2022 11:05 AM     HISTORY: Acute pain of right knee.    COMPARISON: None.      Impression    IMPRESSION: Joint spaces appear fairly well preserved with some very  mild marginal osteophyte formation in all three compartments. No  fracture. Possible small joint effusion.    JAS CANADA MD         SYSTEM ID:  [id number]           Kyle Chávez MD  Department of Orthopedic Surgery        Disclaimer: This note consists of symbols derived from keyboarding, dictation and/or voice recognition software. As a result, there may be errors in the script that have gone undetected. Please consider this when interpreting information found in this chart.

## 2022-04-04 ENCOUNTER — HOSPITAL ENCOUNTER (OUTPATIENT)
Dept: MAMMOGRAPHY | Facility: CLINIC | Age: 62
Discharge: HOME | End: 2022-04-04
Attending: INTERNAL MEDICINE | Admitting: INTERNAL MEDICINE
Payer: COMMERCIAL

## 2022-04-04 DIAGNOSIS — Z12.31 VISIT FOR SCREENING MAMMOGRAM: ICD-10-CM

## 2022-04-04 PROCEDURE — 77067 SCR MAMMO BI INCL CAD: CPT

## 2022-05-16 ENCOUNTER — OFFICE VISIT (OUTPATIENT)
Dept: FAMILY MEDICINE | Facility: CLINIC | Age: 62
End: 2022-05-16
Payer: COMMERCIAL

## 2022-05-16 VITALS
BODY MASS INDEX: 29.33 KG/M2 | TEMPERATURE: 97.2 F | SYSTOLIC BLOOD PRESSURE: 121 MMHG | DIASTOLIC BLOOD PRESSURE: 77 MMHG | HEART RATE: 61 BPM | WEIGHT: 145.5 LBS | RESPIRATION RATE: 16 BRPM | HEIGHT: 59 IN | OXYGEN SATURATION: 96 %

## 2022-05-16 DIAGNOSIS — M25.561 CHRONIC PAIN OF RIGHT KNEE: ICD-10-CM

## 2022-05-16 DIAGNOSIS — M53.3 TAIL BONE PAIN: ICD-10-CM

## 2022-05-16 DIAGNOSIS — Z13.29 SCREENING FOR THYROID DISORDER: ICD-10-CM

## 2022-05-16 DIAGNOSIS — Z79.899 MEDICATION MANAGEMENT: ICD-10-CM

## 2022-05-16 DIAGNOSIS — E78.5 HYPERLIPIDEMIA, UNSPECIFIED HYPERLIPIDEMIA TYPE: ICD-10-CM

## 2022-05-16 DIAGNOSIS — Z13.0 SCREENING FOR DEFICIENCY ANEMIA: ICD-10-CM

## 2022-05-16 DIAGNOSIS — Z00.00 ROUTINE GENERAL MEDICAL EXAMINATION AT A HEALTH CARE FACILITY: Primary | ICD-10-CM

## 2022-05-16 DIAGNOSIS — G89.29 CHRONIC PAIN OF RIGHT KNEE: ICD-10-CM

## 2022-05-16 DIAGNOSIS — D22.9 NUMEROUS MOLES: ICD-10-CM

## 2022-05-16 LAB
ERYTHROCYTE [DISTWIDTH] IN BLOOD BY AUTOMATED COUNT: 12.4 % (ref 10–15)
HCT VFR BLD AUTO: 41.4 % (ref 35–47)
HGB BLD-MCNC: 13.9 G/DL (ref 11.7–15.7)
MCH RBC QN AUTO: 30.9 PG (ref 26.5–33)
MCHC RBC AUTO-ENTMCNC: 33.6 G/DL (ref 31.5–36.5)
MCV RBC AUTO: 92 FL (ref 78–100)
PLATELET # BLD AUTO: 301 10E3/UL (ref 150–450)
RBC # BLD AUTO: 4.5 10E6/UL (ref 3.8–5.2)
WBC # BLD AUTO: 7.6 10E3/UL (ref 4–11)

## 2022-05-16 PROCEDURE — 99396 PREV VISIT EST AGE 40-64: CPT | Performed by: INTERNAL MEDICINE

## 2022-05-16 PROCEDURE — 80053 COMPREHEN METABOLIC PANEL: CPT | Performed by: INTERNAL MEDICINE

## 2022-05-16 PROCEDURE — 84443 ASSAY THYROID STIM HORMONE: CPT | Performed by: INTERNAL MEDICINE

## 2022-05-16 PROCEDURE — 85027 COMPLETE CBC AUTOMATED: CPT | Performed by: INTERNAL MEDICINE

## 2022-05-16 PROCEDURE — 99214 OFFICE O/P EST MOD 30 MIN: CPT | Mod: 25 | Performed by: INTERNAL MEDICINE

## 2022-05-16 PROCEDURE — 36415 COLL VENOUS BLD VENIPUNCTURE: CPT | Performed by: INTERNAL MEDICINE

## 2022-05-16 PROCEDURE — 80061 LIPID PANEL: CPT | Performed by: INTERNAL MEDICINE

## 2022-05-16 ASSESSMENT — ENCOUNTER SYMPTOMS
HEMATURIA: 0
DIZZINESS: 0
DYSURIA: 0
SHORTNESS OF BREATH: 0
BREAST MASS: 0
NAUSEA: 0
PALPITATIONS: 0
HEMATOCHEZIA: 0
EYE PAIN: 0
MYALGIAS: 0
SORE THROAT: 0
DIARRHEA: 0
ABDOMINAL PAIN: 0
CHILLS: 0
FEVER: 0
NERVOUS/ANXIOUS: 0
HEADACHES: 0
CONSTIPATION: 0
JOINT SWELLING: 0
COUGH: 0
FREQUENCY: 0
ARTHRALGIAS: 0
WEAKNESS: 0
PARESTHESIAS: 0
HEARTBURN: 0

## 2022-05-16 ASSESSMENT — PAIN SCALES - GENERAL: PAINLEVEL: NO PAIN (0)

## 2022-05-16 NOTE — PATIENT INSTRUCTIONS
You will be due for second booster shot after 6/2    There is this is a new shingles vaccine available called shingrex  It is a series of 2 shots 2-6 months apart.  Considered more than 90% effective.  Please go to any pharmacy to get the  vaccine    Make appointment with orthopedics and with dermatologist     Labs today  Follow up in one year for physical   Seek sooner medical attention if there is any worsening of symptoms or problems.      Preventive Health Recommendations  Female Ages 50 - 64    Yearly exam: See your health care provider every year in order to  Review health changes.   Discuss preventive care.    Review your medicines if your doctor has prescribed any.    Get a Pap test every three years (unless you have an abnormal result and your provider advises testing more often).  If you get Pap tests with HPV test, you only need to test every 5 years, unless you have an abnormal result.   You do not need a Pap test if your uterus was removed (hysterectomy) and you have not had cancer.  You should be tested each year for STDs (sexually transmitted diseases) if you're at risk.   Have a mammogram every 1 to 2 years.  Have a colonoscopy at age 50, or have a yearly FIT test (stool test). These exams screen for colon cancer.    Have a cholesterol test every 5 years, or more often if advised.  Have a diabetes test (fasting glucose) every three years. If you are at risk for diabetes, you should have this test more often.   If you are at risk for osteoporosis (brittle bone disease), think about having a bone density scan (DEXA).    Shots: Get a flu shot each year. Get a tetanus shot every 10 years.    Nutrition:   Eat at least 5 servings of fruits and vegetables each day.  Eat whole-grain bread, whole-wheat pasta and brown rice instead of white grains and rice.  Get adequate Calcium and Vitamin D.     Lifestyle  Exercise at least 150 minutes a week (30 minutes a day, 5 days a week). This will help you control your  weight and prevent disease.  Limit alcohol to one drink per day.  No smoking.   Wear sunscreen to prevent skin cancer.   See your dentist every six months for an exam and cleaning.  See your eye doctor every 1 to 2 years.

## 2022-05-16 NOTE — LETTER
May 18, 2022      Mariaelena Euceda  [address] [facility] [address]        Dear MsDelfino,    We are writing to inform you of your test results.    Bo Ferrell,     This is to inform you regarding your test result.     TSH which is thyroid hormone is normal.   Your total cholesterol is normal.   HDL which is called good cholesterol is low   Your LDL cholesterol is normal.  This is often call bad cholesterol and high levels increase the risk for heart attacks and strokes.   The triglycerides are high. Lowering  the amount of sugar ,alcohol and sweets in the diet helps to control this.Exercise and weight loss helps.   The testing of your kidney function, liver function and electrolytes was satisfactory   Glucose which is your blood sugar is slightly elevated.   Eat low cholesterol low fat  diet and do regular physical activity. Avoid high sugar containing food.   CBC result which includes white count Hemoglobin and  Platelet Counts is normal.       Resulted Orders   Lipid panel reflex to direct LDL Non-fasting   Result Value Ref Range    Cholesterol 160 <200 mg/dL    Triglycerides 228 (H) <150 mg/dL    Direct Measure HDL 41 (L) >=50 mg/dL    LDL Cholesterol Calculated 73 <=100 mg/dL    Non HDL Cholesterol 119 <130 mg/dL    Patient Fasting > 8hrs? Yes     Narrative    Cholesterol  Desirable:  <200 mg/dL    Triglycerides  Normal:  Less than 150 mg/dL  Borderline High:  150-199 mg/dL  High:  200-499 mg/dL  Very High:  Greater than or equal to 500 mg/dL    Direct Measure HDL  Female:  Greater than or equal to 50 mg/dL   Male:  Greater than or equal to 40 mg/dL    LDL Cholesterol  Desirable:  <100mg/dL  Above Desirable:  100-129 mg/dL   Borderline High:  130-159 mg/dL   High:  160-189 mg/dL   Very High:  >= 190 mg/dL    Non HDL Cholesterol  Desirable:  130 mg/dL  Above Desirable:  130-159 mg/dL  Borderline High:  160-189 mg/dL  High:  190-219 mg/dL  Very High:  Greater than or equal to 220 mg/dL   TSH  with free T4 reflex   Result Value Ref Range    TSH 1.09 0.40 - 4.00 mU/L   CBC with platelets   Result Value Ref Range    WBC Count 7.6 4.0 - 11.0 10e3/uL    RBC Count 4.50 3.80 - 5.20 10e6/uL    Hemoglobin 13.9 11.7 - 15.7 g/dL    Hematocrit 41.4 35.0 - 47.0 %    MCV 92 78 - 100 fL    MCH 30.9 26.5 - 33.0 pg    MCHC 33.6 31.5 - 36.5 g/dL    RDW 12.4 10.0 - 15.0 %    Platelet Count 301 150 - 450 10e3/uL   Comprehensive metabolic panel   Result Value Ref Range    Sodium 137 133 - 144 mmol/L    Potassium 4.0 3.4 - 5.3 mmol/L    Chloride 108 94 - 109 mmol/L    Carbon Dioxide (CO2) 26 20 - 32 mmol/L    Anion Gap 3 3 - 14 mmol/L    Urea Nitrogen 11 7 - 30 mg/dL    Creatinine 0.55 0.52 - 1.04 mg/dL    Calcium 8.9 8.5 - 10.1 mg/dL    Glucose 100 (H) 70 - 99 mg/dL    Alkaline Phosphatase 76 40 - 150 U/L    AST 19 0 - 45 U/L    ALT 30 0 - 50 U/L    Protein Total 7.3 6.8 - 8.8 g/dL    Albumin 3.9 3.4 - 5.0 g/dL    Bilirubin Total 0.4 0.2 - 1.3 mg/dL    GFR Estimate >90 >60 mL/min/1.73m2      Comment:      Effective December 21, 2021 eGFRcr in adults is calculated using the 2021 CKD-EPI creatinine equation which includes age and gender (Tiffany et al., NEJM, DOI: 10.1056/PJTYue4568243)       If you have any questions or concerns, please call the clinic at the number listed above.         Sincerely,       Dr.Nasima Dejuan MD,FACP

## 2022-05-16 NOTE — PROGRESS NOTES
SUBJECTIVE:   CC: Mariaelena Euceda is an 61 year old woman who presents for preventive health visit.   History obtained with the help of  on the phone    Patient has been advised of split billing requirements and indicates understanding: Yes  Healthy Habits:     Getting at least 3 servings of Calcium per day:  Yes    Bi-annual eye exam:  Yes    Dental care twice a year:  Yes    Sleep apnea or symptoms of sleep apnea:  None    Diet:  Regular (no restrictions) and Other    Frequency of exercise:  1 day/week    Duration of exercise:  15-30 minutes    Taking medications regularly:  Yes    Medication side effects:  None and Other    PHQ-2 Total Score: 0    Additional concerns today:  No              Today's PHQ-2 Score:   PHQ-2 ( 1999 [employer]) 5/16/2022   Q1: Little interest or pleasure in doing things 0   Q2: Feeling down, depressed or hopeless 0   PHQ-2 Score 0   PHQ-2 Total Score (12-17 Years)- Positive if 3 or more points; Administer PHQ-A if positive -   Q1: Little interest or pleasure in doing things Not at all   Q2: Feeling down, depressed or hopeless Not at all   PHQ-2 Score 0       Abuse: Current or Past (Physical, Sexual or Emotional) - No  Do you feel safe in your environment? Yes        Social History     Tobacco Use     Smoking status: Never Smoker     Smokeless tobacco: Never Used   Substance Use Topics     Alcohol use: No     Alcohol/week: 0.0 standard drinks         Alcohol Use 5/16/2022   Prescreen: >3 drinks/day or >7 drinks/week? No   Prescreen: >3 drinks/day or >7 drinks/week? -       Reviewed orders with patient.  Reviewed health maintenance and updated orders accordingly - Yes  Lab work is in process    Breast Cancer Screening:  Any new diagnosis of family breast, ovarian, or bowel cancer? No    FHS-7:   Breast CA Risk Assessment (FHS-7) 4/4/2022   Did any of your first-degree relatives have breast or ovarian cancer? No   Did any of your relatives have bilateral breast cancer? No    Did any man in your family have breast cancer? No   Did any woman in your family have breast and ovarian cancer? No   Did any woman in your family have breast cancer before age 50 y? No   Do you have 2 or more relatives with breast and/or ovarian cancer? No   Do you have 2 or more relatives with breast and/or bowel cancer? No         Pertinent mammograms are reviewed under the imaging tab.    History of abnormal Pap smear: Status post benign hysterectomy. Health Maintenance and Surgical History updated.  PAP / HPV 4/6/2012 5/13/2008 1/5/2007   PAP (Historical) NIL NIL NIL     Reviewed and updated as needed this visit by clinical staff   Tobacco  Allergies  Meds  Problems  Med Hx  Surg Hx  Fam Hx            Reviewed and updated as needed this visit by Provider                       Review of Systems   Constitutional: Negative for chills and fever.   HENT: Negative for congestion, ear pain, hearing loss and sore throat.    Eyes: Negative for pain and visual disturbance.   Respiratory: Negative for cough and shortness of breath.    Cardiovascular: Positive for peripheral edema. Negative for chest pain and palpitations.   Gastrointestinal: Negative for abdominal pain, constipation, diarrhea, heartburn, hematochezia and nausea.   Breasts:  Negative for tenderness, breast mass and discharge.   Genitourinary: Negative for dysuria, frequency, genital sores, hematuria, pelvic pain, urgency, vaginal bleeding and vaginal discharge.   Musculoskeletal: Negative for arthralgias, joint swelling and myalgias.   Skin: Negative for rash.   Neurological: Negative for dizziness, weakness, headaches and paresthesias.   Psychiatric/Behavioral: Negative for mood changes. The patient is not nervous/anxious.    Patient says her pain has improved but she has swelling of the knee and sometimes when she is at work it hurts  This is on the right side     OBJECTIVE:   /77 (BP Location: Right arm, Cuff Size: Adult Regular)   Pulse  "61   Temp 97.2  F (36.2  C) (Tympanic)   Resp 16   Ht 1.499 m (4' 11\")   Wt 66 kg (145 lb 8 oz)   LMP 12/02/2010   SpO2 96%   BMI 29.39 kg/m    Physical Exam  GENERAL: healthy, alert and no distress  EYES: Eyes grossly normal to inspection, PERRL and conjunctivae and sclerae normal  HENT: ear canals and TM's normal, nose and mouth without ulcers or lesions  NECK: no adenopathy, no asymmetry, masses, or scars and thyroid normal to palpation  RESP: lungs clear to auscultation - no rales, rhonchi or wheezes  BREAST: normal without masses, tenderness or nipple discharge and no palpable axillary masses or adenopathy  CV: regular rate and rhythm, normal S1 S2, no S3 or S4, no murmur, click or rub, no peripheral edema and peripheral pulses strong  ABDOMEN: soft, nontender, no hepatosplenomegaly, no masses and bowel sounds normal  MS: no gross musculoskeletal defects noted  Is right knee which is slightly more swollen compared to the left  No signs of infection and she has full range of motion  SKIN: no suspicious lesions or rashes  She has numerous moles and freckles  NEURO: Normal strength and tone, mentation intact and speech normal  PSYCH: mentation appears normal, affect normal/bright        ASSESSMENT/PLAN:   Mariaelena was seen today for physical.    Diagnoses and all orders for this visit:    Routine general medical examination at a health care facility  Preventive health counseling was also done.  Colonoscopy was done on December 29, 2020  Mammogram was done on 4/4/2022  Due for Shingrix    Tail bone pain  Notices that only when she sits for prolonged period of time like in a flight  This is not all the time  Discussed with her that she should do intermittent is standing  If she ends up and sitting for long time then try to take a short break and move around  Take Tylenol for pain  If symptoms get worse or persist then we can consider x-ray and physical therapy    Screening for thyroid disorder  -     TSH with " "free T4 reflex; Future  -     TSH with free T4 reflex    Screening for deficiency anemia  -     CBC with platelets; Future  -     CBC with platelets    Medication management  -     Comprehensive metabolic panel; Future  -     Comprehensive metabolic panel    Hyperlipidemia, unspecified hyperlipidemia type  -     Lipid panel reflex to direct LDL Non-fasting; Future  -     Lipid panel reflex to direct LDL Non-fasting    Chronic pain of right knee  -     diclofenac (VOLTAREN) 1 % topical gel; Apply 2 g topically 4 times daily  -     Orthopedic  Referral; Future  I referred her to orthopedics  She can take Tylenol for pain  She can apply Voltaren cream    Numerous moles  -     Adult Dermatology Referral; Future  Patient has numerous moles and freckles and some on her face also referred to dermatology for skin cancer screening        Patient has been advised of split billing requirements and indicates understanding: Yes    COUNSELING:  Reviewed preventive health counseling, as reflected in patient instructions       Regular exercise       Healthy diet/nutrition       Osteoporosis prevention/bone health    Estimated body mass index is 29.39 kg/m  as calculated from the following:    Height as of this encounter: 1.499 m (4' 11\").    Weight as of this encounter: 66 kg (145 lb 8 oz).    Weight management plan: Discussed healthy diet and exercise guidelines    She reports that she has never smoked. She has never used smokeless tobacco.      Counseling Resources:  ATP IV Guidelines  Pooled Cohorts Equation Calculator  Breast Cancer Risk Calculator  BRCA-Related Cancer Risk Assessment: FHS-7 Tool  FRAX Risk Assessment  ICSI Preventive Guidelines  Dietary Guidelines for Americans, 2010  USDA's MyPlate  ASA Prophylaxis  Lung CA Screening    Janell Zaidi MD  [facility]  "

## 2022-05-17 LAB
ALBUMIN SERPL-MCNC: 3.9 G/DL (ref 3.4–5)
ALP SERPL-CCNC: 76 U/L (ref 40–150)
ALT SERPL W P-5'-P-CCNC: 30 U/L (ref 0–50)
ANION GAP SERPL CALCULATED.3IONS-SCNC: 3 MMOL/L (ref 3–14)
AST SERPL W P-5'-P-CCNC: 19 U/L (ref 0–45)
BILIRUB SERPL-MCNC: 0.4 MG/DL (ref 0.2–1.3)
BUN SERPL-MCNC: 11 MG/DL (ref 7–30)
CALCIUM SERPL-MCNC: 8.9 MG/DL (ref 8.5–10.1)
CHLORIDE BLD-SCNC: 108 MMOL/L (ref 94–109)
CHOLEST SERPL-MCNC: 160 MG/DL
CO2 SERPL-SCNC: 26 MMOL/L (ref 20–32)
CREAT SERPL-MCNC: 0.55 MG/DL (ref 0.52–1.04)
FASTING STATUS PATIENT QL REPORTED: YES
GFR SERPL CREATININE-BSD FRML MDRD: >90 ML/MIN/1.73M2
GLUCOSE BLD-MCNC: 100 MG/DL (ref 70–99)
HDLC SERPL-MCNC: 41 MG/DL
LDLC SERPL CALC-MCNC: 73 MG/DL
NONHDLC SERPL-MCNC: 119 MG/DL
POTASSIUM BLD-SCNC: 4 MMOL/L (ref 3.4–5.3)
PROT SERPL-MCNC: 7.3 G/DL (ref 6.8–8.8)
SODIUM SERPL-SCNC: 137 MMOL/L (ref 133–144)
TRIGL SERPL-MCNC: 228 MG/DL
TSH SERPL DL<=0.005 MIU/L-ACNC: 1.09 MU/L (ref 0.4–4)

## 2022-05-18 NOTE — RESULT ENCOUNTER NOTE
Please notify patient by sending following letter with copy of test results      Bo Mariaelena,    This is to inform you regarding your test result.    TSH which is thyroid hormone is normal.  Your total cholesterol is normal.  HDL which is called good cholesterol is low  Your LDL cholesterol is normal.  This is often call bad cholesterol and high levels increase the risk for heart attacks and strokes.  The triglycerides are high. Lowering  the amount of sugar ,alcohol and sweets in the diet helps to control this.Exercise and weight loss helps.  The testing of your kidney function, liver function and electrolytes was satisfactory   Glucose which is your blood sugar is slightly elevated.  Eat low cholesterol low fat  diet and do regular physical activity. Avoid high sugar containing food.  CBC result which includes white count Hemoglobin and  Platelet Counts is normal.             Sincerely,      Dr.Nasima Dejuan MD,FACP

## 2022-05-19 ENCOUNTER — APPOINTMENT (OUTPATIENT)
Dept: INTERPRETER SERVICES | Facility: CLINIC | Age: 62
End: 2022-05-19
Payer: COMMERCIAL

## 2022-08-24 ENCOUNTER — OFFICE VISIT (OUTPATIENT)
Dept: UROLOGY | Facility: CLINIC | Age: 62
End: 2022-08-24
Payer: COMMERCIAL

## 2022-08-24 VITALS
BODY MASS INDEX: 29.84 KG/M2 | HEIGHT: 59 IN | DIASTOLIC BLOOD PRESSURE: 71 MMHG | SYSTOLIC BLOOD PRESSURE: 135 MMHG | HEART RATE: 85 BPM | WEIGHT: 148 LBS | OXYGEN SATURATION: 96 %

## 2022-08-24 DIAGNOSIS — N39.41 URGENCY INCONTINENCE: ICD-10-CM

## 2022-08-24 DIAGNOSIS — R31.29 MICROSCOPIC HEMATURIA: Primary | ICD-10-CM

## 2022-08-24 LAB
BACTERIA #/AREA URNS HPF: ABNORMAL /HPF
RBC #/AREA URNS AUTO: ABNORMAL /HPF
RESIDUAL VOLUME (RV) (EXTERNAL): 33
SQUAMOUS #/AREA URNS AUTO: ABNORMAL /LPF
WBC #/AREA URNS AUTO: ABNORMAL /HPF

## 2022-08-24 PROCEDURE — 51798 US URINE CAPACITY MEASURE: CPT | Performed by: UROLOGY

## 2022-08-24 PROCEDURE — 99213 OFFICE O/P EST LOW 20 MIN: CPT | Mod: 25 | Performed by: UROLOGY

## 2022-08-24 PROCEDURE — 81015 MICROSCOPIC EXAM OF URINE: CPT | Performed by: UROLOGY

## 2022-08-24 ASSESSMENT — PAIN SCALES - GENERAL: PAINLEVEL: NO PAIN (0)

## 2022-08-24 NOTE — PROGRESS NOTES
"August 24, 2022    Mariaelena was seen today for follow up.    Diagnoses and all orders for this visit:    Microscopic hematuria  -     [facility] - Urine Micro Only; Future  -     [facility] - Urine Micro Only    Urgency incontinence  -     [facility] - Urine Micro Only; Future  -     Physical Therapy Referral; Future  -     [facility] - Urine Micro Only  -     MEASURE POST-VOID RESIDUAL URINE/BLADDER CAPACITY, US NON-IMAGING (54927)    Given the history of microscopic hematuria will repeat a microscopic urinalysis today    Given her urinary urgency incontinence we discussed treatment options but that I recommend starting with pelvic floor PT.  We discussed how this works and she is agreeable    RTC 6 months, sooner if needed    10 minutes were spent today on the day of the encounter in reviewing the EMR including reviewing ED and PCP notes, urine culture, direct patient care including ordering urinalysis and PT, coordination of care and documentation    Jacey Pearce MD MPH  (she/her/hers)   of Urology  [facility]      Subjective    Entire visit today is conducted with the assistance of a iPad .      Patient is here for follow up for microscopic hematuria.  She has had no issues with UTI or gross hematuria since last visit aside from an ED visit 11/28/21 (note from Dr Vinson reviewed) where she did have a UTI (Ucx with 10-50 K  E coli)    She does, however, note some bothersome small volume urgency incontinence.  She saw her PCP in May with also some tailbone and knee pain. (Dr Zaidi's nore from 5/16/22 was reviewed)    She denies any changes in health since last visit    /71   Pulse 85   Ht 1.499 m (4' 11\")   Wt 67.1 kg (148 lb)   LMP 12/02/2010   SpO2 96%   BMI 29.89 kg/m    GENERAL: healthy, alert and no distress  EYES: Eyes grossly normal to inspection, conjunctivae and sclerae normal  HENT: normal cephalic/atraumatic.  External ears, nose and mouth without ulcers or " lesions.  RESP: no audible wheeze, cough, or visible cyanosis.  No visible retractions or increased work of breathing.  Able to speak fully in complete sentences.  NEURO: Cranial nerves grossly intact, mentation intact and speech normal  PSYCH: mentation appears normal, affect normal/bright, judgement and insight intact, normal speech and appearance well-groomed      CC  Patient Care Team:  Janell Zaidi MD as PCP - General (Internal Medicine)  Janell Zaidi MD as Assigned PCP  Jacey Pearce MD as Assigned Surgical Provider  Kyle Chávez MD as Assigned Musculoskeletal Provider  SELF, REFERRED

## 2022-08-24 NOTE — LETTER
8/24/2022       RE: Mariaelena Euceda  [address] [facility] [address]     Dear Colleague,    Thank you for referring your patient, Mariaelena Euceda, to the [facility] at [facility]. Please see a copy of my visit note below.    August 24, 2022    Mariaelena was seen today for follow up.    Diagnoses and all orders for this visit:    Microscopic hematuria  -     [facility] - Urine Micro Only; Future  -     [facility] - Urine Micro Only    Urgency incontinence  -     [facility] - Urine Micro Only; Future  -     Physical Therapy Referral; Future  -     [facility] - Urine Micro Only  -     MEASURE POST-VOID RESIDUAL URINE/BLADDER CAPACITY, US NON-IMAGING (90396)    Given the history of microscopic hematuria will repeat a microscopic urinalysis today    Given her urinary urgency incontinence we discussed treatment options but that I recommend starting with pelvic floor PT.  We discussed how this works and she is agreeable    RTC 6 months, sooner if needed    10 minutes were spent today on the day of the encounter in reviewing the EMR including reviewing ED and PCP notes, urine culture, direct patient care including ordering urinalysis and PT, coordination of care and documentation    Jacey Pearce MD MPH  (she/her/hers)   of Urology  [facility]      Subjective    Entire visit today is conducted with the assistance of a iPad .      Patient is here for follow up for microscopic hematuria.  She has had no issues with UTI or gross hematuria since last visit aside from an ED visit 11/28/21 (note from Dr Vinson reviewed) where she did have a UTI (Ucx with 10-50 K  E coli)    She does, however, note some bothersome small volume urgency incontinence.  She saw her PCP in May with also some tailbone and knee pain. (Dr Zaidi's nore from 5/16/22 was reviewed)    She denies any changes in health since last visit    BP  "135/71   Pulse 85   Ht 1.499 m (4' 11\")   Wt 67.1 kg (148 lb)   LMP 12/02/2010   SpO2 96%   BMI 29.89 kg/m    GENERAL: healthy, alert and no distress  EYES: Eyes grossly normal to inspection, conjunctivae and sclerae normal  HENT: normal cephalic/atraumatic.  External ears, nose and mouth without ulcers or lesions.  RESP: no audible wheeze, cough, or visible cyanosis.  No visible retractions or increased work of breathing.  Able to speak fully in complete sentences.  NEURO: Cranial nerves grossly intact, mentation intact and speech normal  PSYCH: mentation appears normal, affect normal/bright, judgement and insight intact, normal speech and appearance well-groomed      CC  Patient Care Team:  Janell Zaidi MD as PCP - General (Internal Medicine)  Janell Zaidi MD as Assigned PCP  Jacey Pearce MD as Assigned Surgical Provider  Kyle Chávez MD as Assigned Musculoskeletal Provider  SELF, REFERRED    "

## 2022-08-24 NOTE — PATIENT INSTRUCTIONS
Websites with free information:    [employer] patient website: www.voicesforpfd.org    Total Control Program: www.totalcontrolprogram.com    Please see one of the dedicated pelvic floor physical therapist. If you have not heard from the scheduling office within 2 business days, please call [phone] for Preclickview    Please return to see me in 6 months, sooner if needed    It was a pleasure meeting with you today.  Thank you for allowing me and my team the privilege of caring for you today.  YOU are the reason we are here, and I truly hope we provided you with the excellent service you deserve.  Please let us know if there is anything else we can do for you so that we can be sure you are leaving completely satisfied with your care experience.

## 2022-09-30 ENCOUNTER — OFFICE VISIT (OUTPATIENT)
Dept: DERMATOLOGY | Facility: CLINIC | Age: 62
End: 2022-09-30
Attending: INTERNAL MEDICINE
Payer: COMMERCIAL

## 2022-09-30 DIAGNOSIS — L91.8 INFLAMED ACROCHORDON: ICD-10-CM

## 2022-09-30 DIAGNOSIS — L72.9 CYST OF SKIN: Primary | ICD-10-CM

## 2022-09-30 PROCEDURE — 99212 OFFICE O/P EST SF 10 MIN: CPT | Mod: 25 | Performed by: PHYSICIAN ASSISTANT

## 2022-09-30 PROCEDURE — 11200 RMVL SKIN TAGS UP TO&INC 15: CPT | Performed by: PHYSICIAN ASSISTANT

## 2022-09-30 ASSESSMENT — PAIN SCALES - GENERAL: PAINLEVEL: NO PAIN (0)

## 2022-09-30 NOTE — PROGRESS NOTES
HPI:   Chief complaints: Mariaelena Euceda is a pleasant 61 year old female who presents for evaluation of two spots of concern on the back. She has had the areas for about 6 months and will become irritated and itchy. She also has irritated skin tags on the neck she would like removed. No history of skin cancer      PHYSICAL EXAM:    LMP 12/02/2010   Skin exam performed as follows: Type 3 skin. Mood appropriate  Alert and Oriented X 3. Well developed, well nourished in no distress.  General appearance: Normal  Head including face: Normal  Eyes: conjunctiva and lids: Normal  Mouth: Lips, teeth, gums: Normal  Neck: Normal  Cardiovascular: Exam of peripheral vascular system by observation for swelling, varicosities, edema: Normal  Right upper extremity: Normal  Left upper extremity: Normal  Right lower extremity: Normal  Left lower extremity: Normal  Skin: Scalp and body hair: See below    Smooth mobile subcutaneous nodule on the central upper back 10 mm in size with central punctum and on the right medial scapula 10 mm in size  Inflamed pedunculated papules on the neck x 6      ASSESSMENT/PLAN:     1. Cysts on the central upper back and right medial scapula - she will schedule for excision  2. Inflamed acrochordon on the neck x 6. Irritated per patient. Snip excision performed to all areas. Bleeding stopped. Pt tolerated well with no complications.             Follow-up: PRN  CC:   Scribed By: Ashley Timmons, MS, PA-C

## 2022-09-30 NOTE — LETTER
9/30/2022         RE: Mariaelena Euceda  [address]  [place] [facility] [address]        Dear Colleague,    Thank you for referring your patient, Mariaelena Euceda, to the [facility]. Please see a copy of my visit note below.    HPI:   Chief complaints: Mariaelena Euceda is a pleasant 61 year old female who presents for evaluation of two spots of concern on the back. She has had the areas for about 6 months and will become irritated and itchy. She also has irritated skin tags on the neck she would like removed. No history of skin cancer      PHYSICAL EXAM:    LMP 12/02/2010   Skin exam performed as follows: Type 3 skin. Mood appropriate  Alert and Oriented X 3. Well developed, well nourished in no distress.  General appearance: Normal  Head including face: Normal  Eyes: conjunctiva and lids: Normal  Mouth: Lips, teeth, gums: Normal  Neck: Normal  Cardiovascular: Exam of peripheral vascular system by observation for swelling, varicosities, edema: Normal  Right upper extremity: Normal  Left upper extremity: Normal  Right lower extremity: Normal  Left lower extremity: Normal  Skin: Scalp and body hair: See below    Smooth mobile subcutaneous nodule on the central upper back 10 mm in size with central punctum and on the right medial scapula 10 mm in size  Inflamed pedunculated papules on the neck x 6      ASSESSMENT/PLAN:     1. Cysts on the central upper back and right medial scapula - she will schedule for excision  2. Inflamed acrochordon on the neck x 6. Irritated per patient. Snip excision performed to all areas. Bleeding stopped. Pt tolerated well with no complications.             Follow-up: PRN  CC:   Scribed By: Ashley Timmons, MS, PAMARK          Again, thank you for allowing me to participate in the care of your patient.        Sincerely,        Ashley Timmons PA-C

## 2022-12-14 ENCOUNTER — OFFICE VISIT (OUTPATIENT)
Dept: DERMATOLOGY | Facility: CLINIC | Age: 62
End: 2022-12-14
Payer: COMMERCIAL

## 2022-12-14 DIAGNOSIS — D48.5 NEOPLASM OF UNCERTAIN BEHAVIOR OF SCALP: Primary | ICD-10-CM

## 2022-12-14 DIAGNOSIS — D48.5 NEOPLASM OF UNCERTAIN BEHAVIOR OF SKIN: ICD-10-CM

## 2022-12-14 PROCEDURE — 13100 CMPLX RPR TRUNK 1.1-2.5 CM: CPT | Performed by: STUDENT IN AN ORGANIZED HEALTH CARE EDUCATION/TRAINING PROGRAM

## 2022-12-14 PROCEDURE — 88304 TISSUE EXAM BY PATHOLOGIST: CPT | Performed by: DERMATOLOGY

## 2022-12-14 PROCEDURE — 11401 EXC TR-EXT B9+MARG 0.6-1 CM: CPT | Performed by: STUDENT IN AN ORGANIZED HEALTH CARE EDUCATION/TRAINING PROGRAM

## 2022-12-14 ASSESSMENT — PAIN SCALES - GENERAL: PAINLEVEL: NO PAIN (0)

## 2022-12-14 NOTE — LETTER
12/14/2022         RE: Mariaelena Euceda  [address] [facility] [address]        Dear Colleague,    Thank you for referring your patient, Mariaelena Euceda, to the [facility]. Please see a copy of my visit note below.    DERMATOLOGIC SURGERY REPORT    NAME OF PROCEDURE:  EXCISION AND INTERMEDIATE CLOSURE    Surgeon:  Osvaldo Moon MD    PREOPERATIVE DIAGNOSIS: EIC x2  POSTOPERATIVE DIAGNOSIS: Same  FINAL EXCISION SIZE: 6mm upper back and 8 mm lower back lesion with 0mm margins  Repair type: Complex linear  FINAL REPAIR LENGTH:  23 mm  Location: Upper and lower back  Prior Biopsy Accession #: none    INDICATIONS:Excision was indicated for treatment. We discussed the principles of treatment and most likely complications including bleeding, infection, wound dehiscence, pain, nerve damage, and scarring. Informed consent was obtained and the patient underwent the procedure as follows.    PROCEDURE:  The patient was taken to 6 e operative suite. The treatment area was anesthetized with 1% lidocaine with [id number] epinephrine buffered with bicarbonate. The area was washed with Hibiclens, rinsed with saline and draped with sterile towels. The lesion was delineated and excised down to subcutaneous fat. Hemostasis was obtained by electrocoagulation. With a margin      REPAIR:  In order to repair this defect while maintaining the normal anatomic relations and function, we elected to utilize a linear closure. Closure was oriented so that the wound was in the patient's natural skin tension lines. Two redundant cones were removed by triangulation. Due to tightness of the surrounding skin deeper layers of the subcutaneous tissue were undermined extensively to a distance  greater than width of the defect on both sides by dissection in the subcutaneous plane until adequate tissue mobility was obtained. Deep dermal and subcutaneous layer closure was performed using 4-0 monocryl deep,  intradermal and subcutaneous sutures.  Final cutaneous approximation was achieved with 4-0 monocryl simple running sutures.      A total of 5mL of anesthesia was administered for all surgical sites. Estimated blood loss was less than 5mL for all surgical sites. A sterile pressure dressing was applied and wound care instructions, with a written handout, were given. The patient was discharged from the [facility] alert and ambulatory.    Osvaldo Moon M.D.      Department of Dermatology           Again, thank you for allowing me to participate in the care of your patient.        Sincerely,        Osvaldo Moon MD

## 2022-12-14 NOTE — PATIENT INSTRUCTIONS
Excision Wound Care Instructions  I will experience scar, altered skin color, bleeding, swelling, pain, crusting and redness. I may experience altered sensation. Risks are excessive bleeding, infection, muscle weakness, thick (hypertrophic or keloidal) scar, and recurrence. A second procedure may be recommended to obtain the best cosmetic or functional result.  Possible complications of any surgical procedure are bleeding, infection, scarring, alteration in skin color and sensation, muscle weakness in the area, wound dehiscence or seperation, or recurrence of the lesion or disease. On occasion, after healing, a secondary procedure or revision may be recommended in order to obtain the best cosmetic or functional result.   After your surgery, a pressure bandage will be placed over the area that has sutures. This will help prevent bleeding. Please follow these instructions until you come back to clinic for suture removal on day 10, as they will help you to prevent complications as your wound heals.  For the First 24 hours After Surgery:  Leave the pressure bandage on and keep it dry. If it should come loose, you may retape it, but do not take it off.  Relax and take it easy. Do not do any vigorous exercise, heavy lifting, or bending forward. This could cause the wound to bleed.  Post-operative pain is usually mild. You may alternate between 1000 mg of Tylenol (acetaminophen) and 400 mg of Ibuprofen every 4 hours.  Do not take more than 4,000mg of acetaminophen in a 24 hour period or 3200 mg of Ibuprofen in a 24 hr period.  Avoid alcohol and vitamin E as these may increase your tendency to bleed.  You may put an ice pack around the bandaged area for 20 minutes every 2-3 hours. This may help reduce swelling, bruising, and pain. Make sure the ice pack is waterproof so that the pressure bandage does not get wet.   You may see a small amount of drainage or blood on your pressure bandage. This is normal. However, if drainage  or bleeding continues or saturates the bandage, you will need to apply firm pressure over the bandage with a washcloth for 15 minutes. If bleeding continues after applying pressure for 15 minutes then go to the nearest emergency room.  After the first 24 hours from Surgery  Carefully remove the bandage and start daily wound care and dressing changes. You may also now shower and get the wound wet.  Daily Wound Care:  Wash wound with a mild soap and water.  Use caution when washing the wound, be gentle and do not let the forceful shower stream hit the wound directly.  Pat dry.  Apply Vaseline (from a new container or tube) over the suture line with a Q-tip. It is very important to keep the wound continuously moist, as wounds heal best in a moist environment.  If you had stitches placed keep the site covered until sutures have either been removed or dissolve.  You can cover it with a Telfa (non-stick) dressing and tape or a band-aid.    If you are unable to keep wound covered, you must apply Vaseline every 2-3 hours (while awake) to ensure it is being kept moist for optimal healing. A dressing overnight is recommended to keep the area moist.  Call Us If:  You have pain that is not controlled with Tylenol/Ibuprofen  You have signs or symptoms of an infection, such as: fever over 100 degrees F, redness, warmth, or foul-smelling or yellow drainage from the wound.  Who should I call with questions?  [facility]: [phone]   [facility]: [phone]  [facility]: [phone]  For urgent needs outside of business hours call the [facility] at [phone] and ask to speak with the dermatology resident on call     Instrucciones para el cuidado de heridas por escisión  Experimentaré cicatriz, alteración del color de la piel, sangrado, hinchazón, dolor, formación de costras y enrojecimiento. Puedo experimentar sherine sensación  alterada. Los riesgos son sangrado excesivo, infección, debilidad muscular, cicatriz gruesa (hipertrófica o queloide) y recurrencia. Se puede recomendar un elda procedimiento para obtener el mejor resultado cosmético o funcional.  Las posibles complicaciones de cualquier procedimiento quirúrgico son sangrado, infección, cicatrización, alteración en el color y sensibilidad de la piel, debilidad muscular en el área, dehiscencia o separación de la herida, o recurrencia de la lesión o enfermedad. En ocasiones, después de la cicatrización, se puede recomendar un procedimiento secundario o de revisión para obtener el mejor resultado cosmético o funcional.  Después de jean cirugía, se colocará un vendaje de presión sobre el área que tiene suturas. [place] ayudará a prevenir el sangrado. Siga estas instrucciones hasta que regrese a la clínica para que le quiten las suturas el day 10, ya que le ayudarán a prevenir complicaciones a medida que jean herida elizabeth.  Para las primeras 24 horas después de la cirugía:  1. Deje puesta la venda de presión y manténgala seca. Si se afloja, puede volver a pegarlo, rukhsana no se lo quite.  2. Relájese y tómelo con calma. No santos ningún ejercicio vigoroso, no levante objetos pesados ni se incline hacia adelante. Walsh podría hacer que la herida sangrara.  3. El dolor posoperatorio suele ser leve. Puede alternar entre 1000 mg de Tylenol (acetaminofén) y 400 mg de ibuprofeno cada 4 horas. No tome más de 4000 mg de paracetamol en un período de 24 horas o 3200 mg de ibuprofeno en un período de 24 horas. Evite el alcohol y la vitamina E, ya que pueden aumentar jean tendencia a sangrar.  4. Puede colocar sherine bolsa de hielo alrededor del área vendada danna 20 minutos cada 2 a 3 horas. Walsh puede ayudar a reducir la hinchazón, los moretones y el dolor. Asegúrese de que la bolsa de hielo sea resistente al agua para que el vendaje de presión no se moje.  5. Es posible que mery sherine pequeña cantidad de drenaje  o samuel en el vendaje de presión. [place] es normal. Sin embargo, si el drenaje o el sangrado continúan o saturan el vendaje, deberá aplicar sherine presión firme sobre el vendaje con sherine toallita danna 15 minutos. Si el sangrado continúa después de aplicar presión danna 15 minutos, diríjase a la shad de emergencias más cercana.  Después de las primeras 24 horas desde la Cirugía  Retire con cuidado el vendaje y comience el cuidado diario de la herida y los cambios de vendaje. Ahora también puede ducharse y mojar la herida.  Cuidado diario de heridas:  1. Lave la herida con un jabón suave y agua. Tenga cuidado al estephania la herida, sea cuidadoso y no deje que el hernan chorro de la ducha golpee directamente la herida.  2. Seque.  3. Aplique vaselina (de un recipiente o tubo nuevo) sobre la línea de sutura con un Q-tip. Es muy importante mantener la herida continuamente húmeda, ya que las heridas cicatrizan mejor en un ambiente húmedo.  4. Si le colocaron puntos, mantenga el sitio cubierto hasta que las suturas hayan sido removidas o disueltas. Puede cubrirlo con un vendaje Telfa (antiadherente) y cinta adhesiva o sherine curita.  5. Si no puede mantener la herida cubierta, debe aplicar vaselina cada 2 o 3 horas (mientras está despierto) para asegurarse de que se mantenga húmeda para sherine cicatrización óptima. Se recomienda un vendaje danna la noche para mantener el área húmeda.  Llámenos si:  1. Tiene dolor que no se controla con Tylenol/Ibuprofen  2. Tiene signos o síntomas de sherine infección, tales den: fiebre de más de 100 grados F, enrojecimiento, calor o secreción maloliente o amarilla de la herida.   A quién rubi llamar si tengo preguntas?  ? Maile de la [facility]: [phone]  ? Maile de la [facility]: [phone]  ? Maile de la [facility]: [phone]  ? Para necesidades urgentes fuera del horario comercial, llame al [facility] [place]  Leatha flores [phone] y pida hablar con el residente de dermatología de mike.

## 2022-12-14 NOTE — PROGRESS NOTES
DERMATOLOGIC SURGERY REPORT    NAME OF PROCEDURE:  EXCISION AND INTERMEDIATE CLOSURE    Surgeon:  Osvaldo Moon MD    PREOPERATIVE DIAGNOSIS: EIC x2  POSTOPERATIVE DIAGNOSIS: Same  FINAL EXCISION SIZE: 6mm upper back and 8 mm lower back lesion with 0mm margins  Repair type: Complex linear  FINAL REPAIR LENGTH:  23 mm  Location: Upper and lower back  Prior Biopsy Accession #: none    INDICATIONS:Excision was indicated for treatment. We discussed the principles of treatment and most likely complications including bleeding, infection, wound dehiscence, pain, nerve damage, and scarring. Informed consent was obtained and the patient underwent the procedure as follows.    PROCEDURE:  The patient was taken to [facility] operative suite. The treatment area was anesthetized with 1% lidocaine with [id number] epinephrine buffered with bicarbonate. The area was washed with Hibiclens, rinsed with saline and draped with sterile towels. The lesion was delineated and excised down to subcutaneous fat. Hemostasis was obtained by electrocoagulation. With a margin      REPAIR:  In order to repair this defect while maintaining the normal anatomic relations and function, we elected to utilize a linear closure. Closure was oriented so that the wound was in the patient's natural skin tension lines. Two redundant cones were removed by triangulation. Due to tightness of the surrounding skin deeper layers of the subcutaneous tissue were undermined extensively to a distance  greater than width of the defect on both sides by dissection in the subcutaneous plane until adequate tissue mobility was obtained. Deep dermal and subcutaneous layer closure was performed using 4-0 monocryl deep, intradermal and subcutaneous sutures.  Final cutaneous approximation was achieved with 4-0 monocryl simple running sutures.      A total of 5mL of anesthesia was administered for all surgical sites. Estimated blood loss was less than 5mL for all surgical sites. A  sterile pressure dressing was applied and wound care instructions, with a written handout, were given. The patient was [facility] alert and ambulatory.    Osvaldo Moon M.D.      Department of Dermatology

## 2022-12-18 LAB
PATH REPORT.COMMENTS IMP SPEC: NORMAL
PATH REPORT.COMMENTS IMP SPEC: NORMAL
PATH REPORT.FINAL DX SPEC: NORMAL
PATH REPORT.GROSS SPEC: NORMAL
PATH REPORT.MICROSCOPIC SPEC OTHER STN: NORMAL
PATH REPORT.RELEVANT HX SPEC: NORMAL

## 2022-12-21 ENCOUNTER — ALLIED HEALTH/NURSE VISIT (OUTPATIENT)
Dept: DERMATOLOGY | Facility: CLINIC | Age: 62
End: 2022-12-21
Payer: COMMERCIAL

## 2022-12-21 DIAGNOSIS — Z48.02 VISIT FOR SUTURE REMOVAL: Primary | ICD-10-CM

## 2022-12-21 PROCEDURE — 99207 PR NO CHARGE NURSE ONLY: CPT | Performed by: STUDENT IN AN ORGANIZED HEALTH CARE EDUCATION/TRAINING PROGRAM

## 2022-12-21 NOTE — PROGRESS NOTES
Mariaelena Euceda comes into clinic today at the request of Dr. Moon Ordering Provider for Suture Removal:  Incision was dry, clean and intact, incision cleansed with wound cleanser and sutures were removed. Pt tolerated the procedure.     This service provided today was under the supervising provider of the day Dr. Moon, who was available if needed.    Lisandra PETERSON RN  Dermatology   [phone]

## 2023-03-29 ENCOUNTER — ANCILLARY ORDERS (OUTPATIENT)
Dept: FAMILY MEDICINE | Facility: CLINIC | Age: 63
End: 2023-03-29

## 2023-03-29 DIAGNOSIS — Z12.31 VISIT FOR SCREENING MAMMOGRAM: ICD-10-CM

## 2023-04-12 ENCOUNTER — HOSPITAL ENCOUNTER (OUTPATIENT)
Dept: MAMMOGRAPHY | Facility: CLINIC | Age: 63
Discharge: HOME | End: 2023-04-12
Admitting: INTERNAL MEDICINE
Payer: COMMERCIAL

## 2023-04-12 DIAGNOSIS — Z12.31 VISIT FOR SCREENING MAMMOGRAM: ICD-10-CM

## 2023-04-12 PROCEDURE — 77067 SCR MAMMO BI INCL CAD: CPT

## 2023-09-12 ENCOUNTER — OFFICE VISIT (OUTPATIENT)
Dept: UROLOGY | Facility: CLINIC | Age: 63
End: 2023-09-12
Payer: COMMERCIAL

## 2023-09-12 VITALS
OXYGEN SATURATION: 98 % | HEART RATE: 67 BPM | DIASTOLIC BLOOD PRESSURE: 69 MMHG | BODY MASS INDEX: 28.86 KG/M2 | HEIGHT: 60 IN | SYSTOLIC BLOOD PRESSURE: 122 MMHG | WEIGHT: 147 LBS

## 2023-09-12 DIAGNOSIS — N39.41 URGENCY INCONTINENCE: ICD-10-CM

## 2023-09-12 DIAGNOSIS — R31.29 MICROSCOPIC HEMATURIA: Primary | ICD-10-CM

## 2023-09-12 LAB
ALBUMIN UR-MCNC: NEGATIVE MG/DL
APPEARANCE UR: CLEAR
BACTERIA #/AREA URNS HPF: ABNORMAL /HPF
BILIRUB UR QL STRIP: NEGATIVE
COLOR UR AUTO: YELLOW
GLUCOSE UR STRIP-MCNC: NEGATIVE MG/DL
HGB UR QL STRIP: ABNORMAL
KETONES UR STRIP-MCNC: NEGATIVE MG/DL
LEUKOCYTE ESTERASE UR QL STRIP: ABNORMAL
MUCOUS THREADS #/AREA URNS LPF: PRESENT /LPF
NITRATE UR QL: NEGATIVE
PH UR STRIP: 5.5 [PH] (ref 5–7)
RBC URINE: 3 /HPF
SP GR UR STRIP: 1.02 (ref 1–1.03)
SQUAMOUS EPITHELIAL: 4 /HPF
UROBILINOGEN UR STRIP-ACNC: 0.2 E.U./DL
WBC CLUMPS #/AREA URNS HPF: PRESENT /HPF
WBC URINE: 90 /HPF

## 2023-09-12 PROCEDURE — 81001 URINALYSIS AUTO W/SCOPE: CPT | Performed by: UROLOGY

## 2023-09-12 PROCEDURE — 99212 OFFICE O/P EST SF 10 MIN: CPT | Performed by: UROLOGY

## 2023-09-12 ASSESSMENT — PAIN SCALES - GENERAL: PAINLEVEL: NO PAIN (0)

## 2023-09-12 NOTE — NURSING NOTE
Chief Complaint   Patient presents with    Microscopic hematuria     Pt is here for microscopic hematuria as well as urinary frequency.       UA RESULTS:  Recent Labs   Lab Test 09/12/23  1053 08/24/22  1353   COLOR Yellow  --    APPEARANCE Clear  --    URINEGLC Negative  --    URINEBILI Negative  --    URINEKETONE Negative  --    SG 1.025  --    UBLD Small*  --    URINEPH 5.5  --    PROTEIN Negative  --    UROBILINOGEN 0.2  --    NITRITE Negative  --    LEUKEST Moderate*  --    RBCU  --  0-2   WBCU  --  0-5

## 2023-09-12 NOTE — LETTER
9/12/2023       RE: Mariaelena Euceda  [address] [place] [address]     Dear Colleague,    Thank you for referring your patient, Mariaelena Euceda, to the [facility] at [facility]. Please see a copy of my visit note below.    September 12, 2023    Mariaelena was seen today for microscopic hematuria.    Diagnoses and all orders for this visit:    Microscopic hematuria  -     UA without Microscopic [[id number]]; Future  -     MEASURE POST-VOID RESIDUAL URINE/BLADDER CAPACITY, US NON-IMAGING (71324)  -     UA without Microscopic [[id number]]  -     UMIC - Urine Micro Only; Future    Urgency incontinence  -     UA without Microscopic [[id number]]; Future  -     MEASURE POST-VOID RESIDUAL URINE/BLADDER CAPACITY, US NON-IMAGING (37412)  -     UA without Microscopic [[id number]]  -     Physical Therapy Referral; Future    Formal microscopic urinalysis    Referral to PT for urgency incontinence    RTC 6 months to see Qing, sooner if needed    10 minutes were spent today on the day of the encounter in reviewing the EMR, direct patient care including ordering urinalysis, coordination of care and documentation    Jacey Pearce MD MPH  (she/her/hers)   of Urology  [facility]      Subjective    Here for follow up, visit is conducted with the assistance of a .  Patient reports doing well.  Denies gross hematuria, UTI.  Did not get to PT last year but is interested. She denies any changes in health since last visit    /69   Pulse 67   Ht 1.524 m (5')   Wt 66.7 kg (147 lb)   LMP 12/02/2010   SpO2 98%   BMI 28.71 kg/m    GENERAL: healthy, alert and no distress  EYES: Eyes grossly normal to inspection, conjunctivae and sclerae normal  HENT: normal cephalic/atraumatic.  External ears, nose and mouth without ulcers or lesions.  RESP: no audible wheeze, cough, or visible cyanosis.  No visible  retractions or increased work of breathing.  Able to speak fully in complete sentences.  NEURO: Cranial nerves grossly intact, mentation intact and speech normal  PSYCH: mentation appears normal, affect normal/bright, judgement and insight intact, normal speech and appearance well-groomed    Labs/imaging/pathology  Urine dip with small blood      CC  Patient Care Team:  Janell Zaidi MD as PCP - General (Internal Medicine)  Janell Zaidi MD as Assigned PCP  Kyle Chávez MD as Assigned Musculoskeletal Provider  Osvaldo Moon MD as Assigned Surgical Provider  Jacey Pearce MD as MD (Urology)

## 2023-09-12 NOTE — PATIENT INSTRUCTIONS
Websites with free information:    [employer] patient website: www.[email or web address]    Total Control Program: www.totalcontrolprogram.com    Please see one of the dedicated pelvic floor physical therapist. If you have not heard from the scheduling office within 2 business days, please call [phone] for [employer]    Please return to see Qing in 6 months, sooner if needed    It was a pleasure meeting with you today.  Thank you for allowing me and my team the privilege of caring for you today.  YOU are the reason we are here, and I truly hope we provided you with the excellent service you deserve.  Please let us know if there is anything else we can do for you so that we can be sure you are leaving completely satisfied with your care experience.

## 2023-09-12 NOTE — PROGRESS NOTES
September 12, 2023    Mariaelena was seen today for microscopic hematuria.    Diagnoses and all orders for this visit:    Microscopic hematuria  -     UA without Microscopic [[id number]]; Future  -     MEASURE POST-VOID RESIDUAL URINE/BLADDER CAPACITY, US NON-IMAGING (07062)  -     UA without Microscopic [[id number]]  -     UMIC - Urine Micro Only; Future    Urgency incontinence  -     UA without Microscopic [[id number]]; Future  -     MEASURE POST-VOID RESIDUAL URINE/BLADDER CAPACITY, US NON-IMAGING (04886)  -     UA without Microscopic [[id number]]  -     Physical Therapy Referral; Future    Formal microscopic urinalysis    Referral to PT for urgency incontinence    RTC 6 months to see Qnig, sooner if needed    10 minutes were spent today on the day of the encounter in reviewing the EMR, direct patient care including ordering urinalysis, coordination of care and documentation    Jacey Pearce MD MPH  (she/her/hers)   of Urology  [facility]      Subjective    Here for follow up, visit is conducted with the assistance of a .  Patient reports doing well.  Denies gross hematuria, UTI.  Did not get to PT last year but is interested. She denies any changes in health since last visit    /69   Pulse 67   Ht 1.524 m (5')   Wt 66.7 kg (147 lb)   LMP 12/02/2010   SpO2 98%   BMI 28.71 kg/m    GENERAL: healthy, alert and no distress  EYES: Eyes grossly normal to inspection, conjunctivae and sclerae normal  HENT: normal cephalic/atraumatic.  External ears, nose and mouth without ulcers or lesions.  RESP: no audible wheeze, cough, or visible cyanosis.  No visible retractions or increased work of breathing.  Able to speak fully in complete sentences.  NEURO: Cranial nerves grossly intact, mentation intact and speech normal  PSYCH: mentation appears normal, affect normal/bright, judgement and insight intact, normal speech and appearance well-groomed    Labs/imaging/pathology  Urine  dip with small blood      CC  Patient Care Team:  Janell Zaidi MD as PCP - General (Internal Medicine)  Janell Zaidi MD as Assigned PCP  Kyle Chávez MD as Assigned Musculoskeletal Provider  Osvaldo Moon MD as Assigned Surgical Provider  Jacey Pearce MD as MD (Urology)

## 2024-03-13 ENCOUNTER — OFFICE VISIT (OUTPATIENT)
Dept: UROLOGY | Facility: CLINIC | Age: 64
End: 2024-03-13
Payer: COMMERCIAL

## 2024-03-13 VITALS
HEIGHT: 60 IN | BODY MASS INDEX: 28.66 KG/M2 | SYSTOLIC BLOOD PRESSURE: 122 MMHG | WEIGHT: 146 LBS | DIASTOLIC BLOOD PRESSURE: 84 MMHG

## 2024-03-13 DIAGNOSIS — N39.41 URGENCY INCONTINENCE: ICD-10-CM

## 2024-03-13 DIAGNOSIS — K59.00 CONSTIPATION, UNSPECIFIED CONSTIPATION TYPE: ICD-10-CM

## 2024-03-13 DIAGNOSIS — R31.29 MICROSCOPIC HEMATURIA: Primary | ICD-10-CM

## 2024-03-13 PROCEDURE — 99212 OFFICE O/P EST SF 10 MIN: CPT | Performed by: PHYSICIAN ASSISTANT

## 2024-03-13 ASSESSMENT — PAIN SCALES - GENERAL: PAINLEVEL: NO PAIN (0)

## 2024-03-13 NOTE — LETTER
3/13/2024       RE: Mariaelena Euceda  [address]  [place] [facility] [address]     Dear Colleague,    Thank you for referring your patient, Mariaelena Euceda, to the [facility] at [facility]. Please see a copy of my visit note below.    Urology Clinic      Name: Mariaelena Euceda    MRN: [id number]   YOB: 1960  Accompanied at today's visit by:Randy davis                 Chief Complaint:   Urinary incontinence           History of Present Illness:   March 13, 2024    HISTORY:   We have been following 63 year old Mariaelena Euceda for hx of microscopic hematuria with negative workup in 2021, constipation and urinary incontinece. Last saw Dr. Pearce on 9/12/23 and referred to PFPT. Here for follow-up. States she is doing well. Never went to PFPT. Denies any issues with bladder or bowels. Denies gross hematuria. No UTIs in remote past. Patient voices no other concerns at this time.            Allergies:   No Known Allergies         Medications:     No current outpatient medications on file.     No current facility-administered medications for this visit.               Past  Surgical History:     Past Surgical History:   Procedure Laterality Date    BIOPSY OF BREAST, INCISIONAL  2003    left breast, benign    CYSTOSCOPY  06/08/2021    Cystoscopy in office Dr Pearce    HYSTERECTOMY, PAP NO LONGER INDICATED      LAPAROSCOPIC CHOLECYSTECTOMY WITH CHOLANGIOGRAMS N/A 1/8/2017    Procedure: LAPAROSCOPIC CHOLECYSTECTOMY WITH CHOLANGIOGRAMS;  Surgeon: James Umana MD;  Location: [facility] OR    SALPINGO OOPHORECTOMY,R/L/RANDELL      TUBAL LIGATION  1998             Physical Exam:     Vitals:    03/13/24 1032   BP: 122/84   Weight: 66.2 kg (146 lb)   Height: 1.524 m (5')     PSYCH: NAD  EYES: EOMI  NEURO: AAO x3    LABS:   Creatinine   Date Value Ref Range Status   05/16/2022 0.55 0.52 - 1.04 mg/dL Final   04/21/2021 0.56 0.52 -  1.04 mg/dL Final     Cystoscopy Note 6/8/21: After informed consent was obtained patient was prepped and draped in the standard fashion.  The flexible cystoscope was inserted into a normal appearing urethral meatus.  The urothelium was carefully examined and there were no tumors, masses, stones, foreign bodies, or other urothelial abnormalities noted.  Bilateral ureteral orifices were noted in the normal orthotopic position and both effluxed clear urine.  The cystoscope was retroflexed and the bladder neck was unremarkable.  The urethra was carefully examined upon removing the cystoscope and was unremarkable.  Patient tolerated the procedure without complications noted.            Assessment and Plan:   63 year old is a pleasant female who has microscopic hematuria with negative workup in 2021, constipation (resolved) and urinary incontinence (resolved).    Plan:  -  follow-up in 9/2023 and repeat UA/micro at that time.   - advised to contact clinic if sees blood in urine. Consider repeating hematuria workup if microscopic hematuria worsens or develops gross hematuria and possible nephrology referral at that time.   - After discussing the assessment and plan with patient, patient verbalizes understanding and agrees to the above plan. All questions answered.     10 minutes spent on the date of the encounter doing chart review, review of Dr. Pearce's notes, review of labs, review of cysto note, review of CT scan, review of test results, interpretation of tests, patient visit and documentation.      Paula Dawson PA-C  Urology  March 13, 2024      Patient Care Team:  Janlel Zaidi MD as PCP - General (Internal Medicine)  Janell Zaidi MD as Assigned PCP  Jacey Pearce MD as MD (Urology)  Jacey Pearce MD as Assigned Surgical Provider

## 2024-03-13 NOTE — NURSING NOTE
Chief Complaint   Patient presents with    Incontinence     Here for follow up per Dr.Fok Fina Larsen, LPN

## 2024-03-13 NOTE — PROGRESS NOTES
Urology Clinic      Name: Mariaelena Euceda    MRN: [id number]   YOB: 1960  Accompanied at today's visit by:Randy davis                 Chief Complaint:   Urinary incontinence           History of Present Illness:   March 13, 2024    HISTORY:   We have been following 63 year old Mariaelena Euceda for hx of microscopic hematuria with negative workup in 2021, constipation and urinary incontinece. Last saw Dr. Pearce on 9/12/23 and referred to PFPT. Here for follow-up. States she is doing well. Never went to PFPT. Denies any issues with bladder or bowels. Denies gross hematuria. No UTIs in remote past. Patient voices no other concerns at this time.            Allergies:   No Known Allergies         Medications:     No current outpatient medications on file.     No current facility-administered medications for this visit.               Past  Surgical History:     Past Surgical History:   Procedure Laterality Date    BIOPSY OF BREAST, INCISIONAL  2003    left breast, benign    CYSTOSCOPY  06/08/2021    Cystoscopy in office Dr Pearce    HYSTERECTOMY, PAP NO LONGER INDICATED      LAPAROSCOPIC CHOLECYSTECTOMY WITH CHOLANGIOGRAMS N/A 1/8/2017    Procedure: LAPAROSCOPIC CHOLECYSTECTOMY WITH CHOLANGIOGRAMS;  Surgeon: James Umana MD;  Location: [facility] OR    SALPINGO OOPHORECTOMY,R/L/RANDELL      TUBAL LIGATION  1998             Physical Exam:     Vitals:    03/13/24 1032   BP: 122/84   Weight: 66.2 kg (146 lb)   Height: 1.524 m (5')     PSYCH: NAD  EYES: EOMI  NEURO: AAO x3    LABS:   Creatinine   Date Value Ref Range Status   05/16/2022 0.55 0.52 - 1.04 mg/dL Final   04/21/2021 0.56 0.52 - 1.04 mg/dL Final     Cystoscopy Note 6/8/21: After informed consent was obtained patient was prepped and draped in the standard fashion.  The flexible cystoscope was inserted into a normal appearing urethral meatus.  The urothelium was carefully examined and there were no tumors, masses, stones, foreign bodies, or  other urothelial abnormalities noted.  Bilateral ureteral orifices were noted in the normal orthotopic position and both effluxed clear urine.  The cystoscope was retroflexed and the bladder neck was unremarkable.  The urethra was carefully examined upon removing the cystoscope and was unremarkable.  Patient tolerated the procedure without complications noted.            Assessment and Plan:   63 year old is a pleasant female who has microscopic hematuria with negative workup in 2021, constipation (resolved) and urinary incontinence (resolved).    Plan:  -  follow-up in 9/2023 and repeat UA/micro at that time.   - advised to contact clinic if sees blood in urine. Consider repeating hematuria workup if microscopic hematuria worsens or develops gross hematuria and possible nephrology referral at that time.   - After discussing the assessment and plan with patient, patient verbalizes understanding and agrees to the above plan. All questions answered.     10 minutes spent on the date of the encounter doing chart review, review of Dr. Pearce's notes, review of labs, review of cysto note, review of CT scan, review of test results, interpretation of tests, patient visit and documentation.      Paula Dawson PA-C  Urology  March 13, 2024      Patient Care Team:  Janell Zaidi MD as PCP - General (Internal Medicine)  Janell Zaidi MD as Assigned PCP  Jacey Pearce MD as MD (Urology)  Jacey Pearce MD as Assigned Surgical Provider

## 2024-04-15 ENCOUNTER — HOSPITAL ENCOUNTER (OUTPATIENT)
Dept: MAMMOGRAPHY | Facility: CLINIC | Age: 64
Discharge: HOME | End: 2024-04-15
Attending: INTERNAL MEDICINE | Admitting: INTERNAL MEDICINE
Payer: COMMERCIAL

## 2024-04-15 DIAGNOSIS — Z12.31 VISIT FOR SCREENING MAMMOGRAM: ICD-10-CM

## 2024-04-15 PROCEDURE — 77067 SCR MAMMO BI INCL CAD: CPT

## 2024-05-02 ENCOUNTER — OFFICE VISIT (OUTPATIENT)
Dept: FAMILY MEDICINE | Facility: CLINIC | Age: 64
End: 2024-05-02
Payer: COMMERCIAL

## 2024-05-02 VITALS
BODY MASS INDEX: 28.73 KG/M2 | TEMPERATURE: 96.9 F | RESPIRATION RATE: 15 BRPM | SYSTOLIC BLOOD PRESSURE: 130 MMHG | HEART RATE: 70 BPM | OXYGEN SATURATION: 98 % | WEIGHT: 142.5 LBS | DIASTOLIC BLOOD PRESSURE: 84 MMHG | HEIGHT: 59 IN

## 2024-05-02 DIAGNOSIS — Z00.00 ROUTINE GENERAL MEDICAL EXAMINATION AT A HEALTH CARE FACILITY: Primary | ICD-10-CM

## 2024-05-02 DIAGNOSIS — Z12.11 SCREEN FOR COLON CANCER: ICD-10-CM

## 2024-05-02 DIAGNOSIS — Z79.899 MEDICATION MANAGEMENT: ICD-10-CM

## 2024-05-02 DIAGNOSIS — Z90.49 S/P LAPAROSCOPIC CHOLECYSTECTOMY: ICD-10-CM

## 2024-05-02 DIAGNOSIS — Z13.29 SCREENING FOR THYROID DISORDER: ICD-10-CM

## 2024-05-02 DIAGNOSIS — Z83.3 FAMILY HISTORY OF DIABETES MELLITUS: ICD-10-CM

## 2024-05-02 DIAGNOSIS — Z90.710 S/P LAPAROSCOPIC ASSISTED VAGINAL HYSTERECTOMY (LAVH): ICD-10-CM

## 2024-05-02 DIAGNOSIS — E78.5 HYPERLIPIDEMIA, UNSPECIFIED HYPERLIPIDEMIA TYPE: ICD-10-CM

## 2024-05-02 DIAGNOSIS — Z13.0 SCREENING FOR DEFICIENCY ANEMIA: ICD-10-CM

## 2024-05-02 DIAGNOSIS — Z86.0100 HISTORY OF COLONIC POLYPS: ICD-10-CM

## 2024-05-02 LAB
ERYTHROCYTE [DISTWIDTH] IN BLOOD BY AUTOMATED COUNT: 12 % (ref 10–15)
HCT VFR BLD AUTO: 40.8 % (ref 35–47)
HGB BLD-MCNC: 13.6 G/DL (ref 11.7–15.7)
MCH RBC QN AUTO: 30 PG (ref 26.5–33)
MCHC RBC AUTO-ENTMCNC: 33.3 G/DL (ref 31.5–36.5)
MCV RBC AUTO: 90 FL (ref 78–100)
PLATELET # BLD AUTO: 293 10E3/UL (ref 150–450)
RBC # BLD AUTO: 4.54 10E6/UL (ref 3.8–5.2)
WBC # BLD AUTO: 7.7 10E3/UL (ref 4–11)

## 2024-05-02 PROCEDURE — 80053 COMPREHEN METABOLIC PANEL: CPT | Performed by: INTERNAL MEDICINE

## 2024-05-02 PROCEDURE — 99396 PREV VISIT EST AGE 40-64: CPT | Performed by: INTERNAL MEDICINE

## 2024-05-02 PROCEDURE — 85027 COMPLETE CBC AUTOMATED: CPT | Performed by: INTERNAL MEDICINE

## 2024-05-02 PROCEDURE — 36415 COLL VENOUS BLD VENIPUNCTURE: CPT | Performed by: INTERNAL MEDICINE

## 2024-05-02 PROCEDURE — 80061 LIPID PANEL: CPT | Performed by: INTERNAL MEDICINE

## 2024-05-02 PROCEDURE — 84443 ASSAY THYROID STIM HORMONE: CPT | Performed by: INTERNAL MEDICINE

## 2024-05-02 SDOH — HEALTH STABILITY: PHYSICAL HEALTH: ON AVERAGE, HOW MANY DAYS PER WEEK DO YOU ENGAGE IN MODERATE TO STRENUOUS EXERCISE (LIKE A BRISK WALK)?: 2 DAYS

## 2024-05-02 SDOH — HEALTH STABILITY: PHYSICAL HEALTH: ON AVERAGE, HOW MANY MINUTES DO YOU ENGAGE IN EXERCISE AT THIS LEVEL?: 10 MIN

## 2024-05-02 ASSESSMENT — SOCIAL DETERMINANTS OF HEALTH (SDOH): HOW OFTEN DO YOU GET TOGETHER WITH FRIENDS OR RELATIVES?: ONCE A WEEK

## 2024-05-02 ASSESSMENT — PAIN SCALES - GENERAL: PAINLEVEL: NO PAIN (0)

## 2024-05-02 NOTE — PROGRESS NOTES
"Preventive Care Visit  [facility]  Janell Zaidi MD, Internal Medicine  May 2, 2024      Assessment & Plan     Routine general medical examination at a health care facility  Last mammogram on 4/15/24  Last colonoscopy on 12/29/2020  Laparoscopic-assisted vaginal hysterectomy and left salpingo-oophorectomy hysterectomy on 2011     Hyperlipidemia, unspecified hyperlipidemia type  - Lipid panel reflex to direct LDL Fasting    Screen for colon cancer  - Colonoscopy Screening  Referral  [facility] to [facility] [place] [facility]    S/P laparoscopic cholecystectomy  Past history    S/P laparoscopic assisted vaginal hysterectomy (LAVH)  Hysterectomy in 2011    Family history of diabetes mellitus  Not on any medications     Screening for thyroid disorder  - TSH with free T4 reflex  Ordered lab blood draw    Medication management  - Comprehensive metabolic panel  See note above     Screening for deficiency anemia  - CBC with platelets    History of colonic polyps  - Colonoscopy Screening  Referral    Other Orders  Declines RSV, Shingles, and COVID booster  Ordered lab blood work  Per patient, she eats healthy, walks for 30 minutes daily, drinks milk     BMI  Estimated body mass index is 29.12 kg/m  as calculated from the following:    Height as of this encounter: 1.49 m (4' 10.66\").    Weight as of this encounter: 64.6 kg (142 lb 8 oz).       Counseling  Appropriate preventive services were discussed with this patient, including applicable screening as appropriate for fall prevention, nutrition, physical activity, Tobacco-use cessation, weight loss and cognition.  Checklist reviewing preventive services available has been given to the patient.  Reviewed patient's diet, addressing concerns and/or questions.   She is at risk for lack of exercise and has been provided with information to increase physical activity for the benefit of her well-being.       Subjective "   Patient has been advised of split billing requirements and indicates understanding: Yes  Mariaelena is a 63 year old, presenting for the following:  Physical         Health Care Directive  Patient does not have a Health Care Directive or Living Will: Discussed advance care planning with patient; however, patient declined at this time.    HPI        5/2/2024   General Health   How would you rate your overall physical health? Excellent   Feel stress (tense, anxious, or unable to sleep) Not at all         5/2/2024   Nutrition   Three or more servings of calcium each day? Yes   Diet: Regular (no restrictions)   How many servings of fruit and vegetables per day? 4 or more   How many sweetened beverages each day? 0-1         5/2/2024   Exercise   Days per week of moderate/strenous exercise 2 days   Average minutes spent exercising at this level 10 min   (!) EXERCISE CONCERN      5/2/2024   Social Factors   Frequency of gathering with friends or relatives Once a week   Worry food won't last until get money to buy more No   Food not last or not have enough money for food? No   Do you have housing?  Yes   Are you worried about losing your housing? No   Lack of transportation? No   Unable to get utilities (heat,electricity)? No         5/2/2024   Fall Risk   Fallen 2 or more times in the past year? No   Trouble with walking or balance? No          5/2/2024   Dental   Dentist two times every year? Yes         5/2/2024   TB Screening   Were you born outside of the [place]? Yes         Today's PHQ-2 Score:       5/2/2024     9:48 AM   PHQ-2 ( 1999 [employer])   Q1: Little interest or pleasure in doing things 0   Q2: Feeling down, depressed or hopeless 0   PHQ-2 Score 0   Q1: Little interest or pleasure in doing things Not at all   Q2: Feeling down, depressed or hopeless Not at all   PHQ-2 Score 0           5/2/2024   Substance Use   Alcohol more than 3/day or more than 7/wk No   Do you use any other substances recreationally? No      Social History     Tobacco Use    Smoking status: Never    Smokeless tobacco: Never   Substance Use Topics    Alcohol use: No     Alcohol/week: 0.0 standard drinks of alcohol    Drug use: No           4/15/2024   LAST FHS-7 RESULTS   1st degree relative breast or ovarian cancer No   Any relative bilateral breast cancer No   Any male have breast cancer No   Any ONE woman have BOTH breast AND ovarian cancer No   Any woman with breast cancer before 50yrs No   2 or more relatives with breast AND/OR ovarian cancer No   2 or more relatives with breast AND/OR bowel cancer No        Mammogram Screening - Mammogram every 1-2 years updated in Health Maintenance based on mutual decision making        5/2/2024   STI Screening   New sexual partner(s) since last STI/HIV test? No     History of abnormal Pap smear: Status post benign hysterectomy. Health Maintenance and Surgical History updated.        4/6/2012    12:23 PM 5/13/2008    12:00 AM 1/5/2007    12:00 AM   PAP / HPV   PAP (Historical) NIL  NIL  NIL      ASCVD Risk   The 10-year ASCVD risk score (Dani BYRD, et al., 2019) is: 3.4%    Values used to calculate the score:      Age: 63 years      Sex: Female      Is Non- : No      Diabetic: No      Tobacco smoker: No      Systolic Blood Pressure: 122 mmHg      Is BP treated: No      HDL Cholesterol: 41 mg/dL      Total Cholesterol: 160 mg/dL       Reviewed and updated as needed this visit by Provider                        Review of Systems  Constitutional, HEENT, cardiovascular, pulmonary, GI, , musculoskeletal, neuro, skin, endocrine and psych systems are negative, except as otherwise noted.     Objective    Exam  LMP 12/02/2010    Estimated body mass index is 28.51 kg/m  as calculated from the following:    Height as of 3/13/24: 1.524 m (5').    Weight as of 3/13/24: 66.2 kg (146 lb).    Physical Exam  GENERAL: alert and no distress  EYES: Eyes grossly normal to inspection, PERRL and  conjunctivae and sclerae normal  HENT: ear canals and TM's normal, nose and mouth without ulcers or lesions  NECK: no adenopathy, no asymmetry, masses, or scars  RESP: lungs clear to auscultation - no rales, rhonchi or wheezes  CV: regular rate and rhythm, normal S1 S2, no S3 or S4, no murmur, click or rub, no peripheral edema  ABDOMEN: soft, nontender, no hepatosplenomegaly, no masses and bowel sounds normal  MS: no gross musculoskeletal defects noted, no edema  SKIN: no suspicious lesions or rashes  NEURO: Normal strength and tone, mentation intact and speech normal  PSYCH: mentation appears normal, affect normal/bright  : patient has had a hysterectomy     This document serves as a record of the services and decisions personally performed and made by Dr. Zaidi. It was created on her behalf by Brittany Garcia, a trained medical scribe. The creation of this document is based the provider's statements to the medical scribe.    Signed Electronically by: Janell Zaidi MD

## 2024-05-02 NOTE — PATIENT INSTRUCTIONS
Respiratory syncytial virus (RSV) is an important cause of lower respiratory tract disease in older adults.   In 2023, the [place] Food and Drug Administration approved two recombinant RSV vaccines for the prevention of lower respiratory tract disease in individuals 60 years of age and older [1,2].     this vaccine prevented RSV-related respiratory infection and lower respiratory tract disease in adults age 60 years and older who received one dose of an AS01E-adjuvanted RSV Prefusion F Protein Vaccine [51].     This infection may cause serious infection like pneumonia in older patients     So it is good idea to get this RSV vaccine from any local pharmacy .  There is a new shingles vaccine available called shingrex  It is a series of 2 shots 2-6 months apart.  Considered more than 90% effective.  Please go to any pharmacy to get the  vaccine    You are due for covid booster shot     Call the [facility] to schedule colonoscopy      Preventive Care Advice   This is general advice given by our system to help you stay healthy. However, your care team may have specific advice just for you. Please talk to your care team about your preventive care needs.  Nutrition  Eat 5 or more servings of fruits and vegetables each day.  Try wheat bread, brown rice and whole grain pasta (instead of white bread, rice, and pasta).  Get enough calcium and vitamin D. Check the label on foods and aim for 100% of the RDA (recommended daily allowance).  Lifestyle  Exercise at least 150 minutes each week   (30 minutes a day, 5 days a week).  Do muscle strengthening activities 2 days a week. These help control your weight and prevent disease.  No smoking.  Wear sunscreen to prevent skin cancer.  Have a dental exam and cleaning every 6 months.  Yearly exams  See your health care team every year to talk about:  Any changes in your health.  Any medicines your care team has prescribed.  Preventive care, family planning, and ways to prevent chronic  diseases.  Shots (vaccines)   HPV shots (up to age 26), if you've never had them before.  Hepatitis B shots (up to age 59), if you've never had them before.  COVID-19 shot: Get this shot when it's due.  Flu shot: Get a flu shot every year.  Tetanus shot: Get a tetanus shot every 10 years.  Pneumococcal, hepatitis A, and RSV shots: Ask your care team if you need these based on your risk.  Shingles shot (for age 50 and up).  General health tests  Diabetes screening:  Starting at age 35, Get screened for diabetes at least every 3 years.  If you are younger than age 35, ask your care team if you should be screened for diabetes.  Cholesterol test: At age 39, start having a cholesterol test every 5 years, or more often if advised.  Bone density scan (DEXA): At age 50, ask your care team if you should have this scan for osteoporosis (brittle bones).  Hepatitis C: Get tested at least once in your life.  STIs (sexually transmitted infections)  Before age 24: Ask your care team if you should be screened for STIs.  After age 24: Get screened for STIs if you're at risk. You are at risk for STIs (including HIV) if:  You are sexually active with more than one person.  You don't use condoms every time.  You or a partner was diagnosed with a sexually transmitted infection.  If you are at risk for HIV, ask about PrEP medicine to prevent HIV.  Get tested for HIV at least once in your life, whether you are at risk for HIV or not.  Cancer screening tests  Cervical cancer screening: If you have a cervix, begin getting regular cervical cancer screening tests at age 21. Most people who have regular screenings with normal results can stop after age 65. Talk about this with your provider.  Breast cancer scan (mammogram): If you've ever had breasts, begin having regular mammograms starting at age 40. This is a scan to check for breast cancer.  Colon cancer screening: It is important to start screening for colon cancer at age 45.  Have a  colonoscopy test every 10 years (or more often if you're at risk) Or, ask your provider about stool tests like a FIT test every year or Cologuard test every 3 years.  To learn more about your testing options, visit: [email or web address]://www.[email or web address].pdf.  For help making a decision, visit: https://bit.[employer]/wa57953.  Prostate cancer screening test: If you have a prostate and are age 55 to 69, ask your provider if you would benefit from a yearly prostate cancer screening test.  Lung cancer screening: If you are a current or former smoker age 50 to 80, ask your care team if ongoing lung cancer screenings are right for you.  For informational purposes only. Not to replace the advice of your health care provider. Copyright   2023 [employer]. All rights reserved. Clinically reviewed by the [facility]. [employer] 867911 - REV 01/24.

## 2024-05-02 NOTE — LETTER
May 6, 2024      Mariaelena Reidzquez  [address] [address]        Dear ,    We are writing to inform you of your test results.    Your total cholesterol is normal.   HDL which is called good cholesterol is low   Your LDL cholesterol is normal.  This is often call bad cholesterol and high levels increase the risk for heart attacks and strokes.   Your triglycerides are normal.   The testing of your blood sugar, kidney function, liver function and electrolytes was normal.   TSH which is thyroid hormone is normal.   CBC result which includes white count Hemoglobin and  Platelet Counts is normal.       Sincerely,       Dr.Nasima Dejuan MD,FACP     Resulted Orders   Lipid panel reflex to direct LDL Fasting   Result Value Ref Range    Cholesterol 173 <200 mg/dL    Triglycerides 147 <150 mg/dL    Direct Measure HDL 48 (L) >=50 mg/dL    LDL Cholesterol Calculated 96 <=100 mg/dL    Non HDL Cholesterol 125 <130 mg/dL    Patient Fasting > 8hrs? Yes     Narrative    Cholesterol  Desirable:  <200 mg/dL    Triglycerides  Normal:  Less than 150 mg/dL  Borderline High:  150-199 mg/dL  High:  200-499 mg/dL  Very High:  Greater than or equal to 500 mg/dL    Direct Measure HDL  Female:  Greater than or equal to 50 mg/dL   Male:  Greater than or equal to 40 mg/dL    LDL Cholesterol  Desirable:  <100mg/dL  Above Desirable:  100-129 mg/dL   Borderline High:  130-159 mg/dL   High:  160-189 mg/dL   Very High:  >= 190 mg/dL    Non HDL Cholesterol  Desirable:  130 mg/dL  Above Desirable:  130-159 mg/dL  Borderline High:  160-189 mg/dL  High:  190-219 mg/dL  Very High:  Greater than or equal to 220 mg/dL   TSH with free T4 reflex   Result Value Ref Range    TSH 2.09 0.30 - 4.20 uIU/mL   Comprehensive metabolic panel   Result Value Ref Range    Sodium 140 135 - 145 mmol/L      Comment:      Reference intervals for this test were updated on 09/26/2023 to more accurately reflect our healthy population. There may  be differences in the flagging of prior results with similar values performed with this method. Interpretation of those prior results can be made in the context of the updated reference intervals.     Potassium 4.1 3.4 - 5.3 mmol/L    Carbon Dioxide (CO2) 26 22 - 29 mmol/L    Anion Gap 10 7 - 15 mmol/L    Urea Nitrogen 13.8 8.0 - 23.0 mg/dL    Creatinine 0.56 0.51 - 0.95 mg/dL    GFR Estimate >90 >60 mL/min/1.73m2    Calcium 9.0 8.8 - 10.2 mg/dL    Chloride 104 98 - 107 mmol/L    Glucose 95 70 - 99 mg/dL    Alkaline Phosphatase 74 40 - 150 U/L      Comment:      Reference intervals for this test were updated on 11/14/2023 to more accurately reflect our healthy population. There may be differences in the flagging of prior results with similar values performed with this method. Interpretation of those prior results can be made in the context of the updated reference intervals.    AST 19 0 - 45 U/L      Comment:      Reference intervals for this test were updated on 6/12/2023 to more accurately reflect our healthy population. There may be differences in the flagging of prior results with similar values performed with this method. Interpretation of those prior results can be made in the context of the updated reference intervals.    ALT 20 0 - 50 U/L      Comment:      Reference intervals for this test were updated on 6/12/2023 to more accurately reflect our healthy population. There may be differences in the flagging of prior results with similar values performed with this method. Interpretation of those prior results can be made in the context of the updated reference intervals.      Protein Total 6.9 6.4 - 8.3 g/dL    Albumin 4.6 3.5 - 5.2 g/dL    Bilirubin Total 0.4 <=1.2 mg/dL   CBC with platelets   Result Value Ref Range    WBC Count 7.7 4.0 - 11.0 10e3/uL    RBC Count 4.54 3.80 - 5.20 10e6/uL    Hemoglobin 13.6 11.7 - 15.7 g/dL    Hematocrit 40.8 35.0 - 47.0 %    MCV 90 78 - 100 fL    MCH 30.0 26.5 - 33.0 pg     MCHC 33.3 31.5 - 36.5 g/dL    RDW 12.0 10.0 - 15.0 %    Platelet Count 293 150 - 450 10e3/uL       If you have any questions or concerns, please call the clinic at the number listed above.       Sincerely,      Janell Zaidi MD

## 2024-05-03 ENCOUNTER — PATIENT OUTREACH (OUTPATIENT)
Dept: GASTROENTEROLOGY | Facility: CLINIC | Age: 64
End: 2024-05-03
Payer: COMMERCIAL

## 2024-05-03 LAB
ALBUMIN SERPL BCG-MCNC: 4.6 G/DL (ref 3.5–5.2)
ALP SERPL-CCNC: 74 U/L (ref 40–150)
ALT SERPL W P-5'-P-CCNC: 20 U/L (ref 0–50)
ANION GAP SERPL CALCULATED.3IONS-SCNC: 10 MMOL/L (ref 7–15)
AST SERPL W P-5'-P-CCNC: 19 U/L (ref 0–45)
BILIRUB SERPL-MCNC: 0.4 MG/DL
BUN SERPL-MCNC: 13.8 MG/DL (ref 8–23)
CALCIUM SERPL-MCNC: 9 MG/DL (ref 8.8–10.2)
CHLORIDE SERPL-SCNC: 104 MMOL/L (ref 98–107)
CHOLEST SERPL-MCNC: 173 MG/DL
CREAT SERPL-MCNC: 0.56 MG/DL (ref 0.51–0.95)
DEPRECATED HCO3 PLAS-SCNC: 26 MMOL/L (ref 22–29)
EGFRCR SERPLBLD CKD-EPI 2021: >90 ML/MIN/1.73M2
FASTING STATUS PATIENT QL REPORTED: YES
GLUCOSE SERPL-MCNC: 95 MG/DL (ref 70–99)
HDLC SERPL-MCNC: 48 MG/DL
LDLC SERPL CALC-MCNC: 96 MG/DL
NONHDLC SERPL-MCNC: 125 MG/DL
POTASSIUM SERPL-SCNC: 4.1 MMOL/L (ref 3.4–5.3)
PROT SERPL-MCNC: 6.9 G/DL (ref 6.4–8.3)
SODIUM SERPL-SCNC: 140 MMOL/L (ref 135–145)
TRIGL SERPL-MCNC: 147 MG/DL
TSH SERPL DL<=0.005 MIU/L-ACNC: 2.09 UIU/ML (ref 0.3–4.2)

## 2024-05-03 NOTE — RESULT ENCOUNTER NOTE
ease notify patient by sending following letter with copy of test results      Bo Ferrell,    This is to inform you regarding your test result.    Your total cholesterol is normal.  HDL which is called good cholesterol is low  Your LDL cholesterol is normal.  This is often call bad cholesterol and high levels increase the risk for heart attacks and strokes.  Your triglycerides are normal.  The testing of your blood sugar, kidney function, liver function and electrolytes was normal.  TSH which is thyroid hormone is normal.  CBC result which includes white count Hemoglobin and  Platelet Counts is normal.         Sincerely,      Dr.Nasima Dejuan MD,FACP

## 2025-04-23 ENCOUNTER — OFFICE VISIT (OUTPATIENT)
Dept: URGENT CARE | Facility: URGENT CARE | Age: 65
End: 2025-04-23
Payer: COMMERCIAL

## 2025-04-23 VITALS
OXYGEN SATURATION: 98 % | WEIGHT: 144 LBS | HEART RATE: 75 BPM | DIASTOLIC BLOOD PRESSURE: 80 MMHG | RESPIRATION RATE: 16 BRPM | SYSTOLIC BLOOD PRESSURE: 120 MMHG | BODY MASS INDEX: 29.42 KG/M2 | TEMPERATURE: 97.2 F

## 2025-04-23 DIAGNOSIS — N39.0 ACUTE UTI: Primary | ICD-10-CM

## 2025-04-23 DIAGNOSIS — R10.31 RLQ ABDOMINAL PAIN: ICD-10-CM

## 2025-04-23 LAB
ALBUMIN UR-MCNC: NEGATIVE MG/DL
APPEARANCE UR: ABNORMAL
BACTERIA #/AREA URNS HPF: ABNORMAL /HPF
BILIRUB UR QL STRIP: NEGATIVE
CLUE CELLS: ABNORMAL
COLOR UR AUTO: YELLOW
GLUCOSE UR STRIP-MCNC: NEGATIVE MG/DL
HGB UR QL STRIP: ABNORMAL
KETONES UR STRIP-MCNC: NEGATIVE MG/DL
LEUKOCYTE ESTERASE UR QL STRIP: ABNORMAL
MUCOUS THREADS #/AREA URNS LPF: PRESENT /LPF
NITRATE UR QL: NEGATIVE
PH UR STRIP: 6 [PH] (ref 5–7)
RBC #/AREA URNS AUTO: ABNORMAL /HPF
SP GR UR STRIP: 1.02 (ref 1–1.03)
SQUAMOUS #/AREA URNS AUTO: ABNORMAL /LPF
TRICHOMONAS, WET PREP: ABNORMAL
UROBILINOGEN UR STRIP-ACNC: 0.2 E.U./DL
WBC #/AREA URNS AUTO: ABNORMAL /HPF
WBC'S/HIGH POWER FIELD, WET PREP: ABNORMAL
YEAST, WET PREP: ABNORMAL

## 2025-04-23 PROCEDURE — 81001 URINALYSIS AUTO W/SCOPE: CPT | Performed by: PHYSICIAN ASSISTANT

## 2025-04-23 PROCEDURE — 99213 OFFICE O/P EST LOW 20 MIN: CPT | Performed by: PHYSICIAN ASSISTANT

## 2025-04-23 PROCEDURE — 87086 URINE CULTURE/COLONY COUNT: CPT | Performed by: PHYSICIAN ASSISTANT

## 2025-04-23 PROCEDURE — 87210 SMEAR WET MOUNT SALINE/INK: CPT | Performed by: PHYSICIAN ASSISTANT

## 2025-04-23 PROCEDURE — 3079F DIAST BP 80-89 MM HG: CPT | Performed by: PHYSICIAN ASSISTANT

## 2025-04-23 PROCEDURE — 3074F SYST BP LT 130 MM HG: CPT | Performed by: PHYSICIAN ASSISTANT

## 2025-04-23 RX ORDER — PHENAZOPYRIDINE HYDROCHLORIDE 200 MG/1
200 TABLET, FILM COATED ORAL 3 TIMES DAILY PRN
Qty: 9 TABLET | Refills: 0 | Status: SHIPPED | OUTPATIENT
Start: 2025-04-23

## 2025-04-23 RX ORDER — CEFDINIR 300 MG/1
300 CAPSULE ORAL 2 TIMES DAILY
Qty: 14 CAPSULE | Refills: 0 | Status: SHIPPED | OUTPATIENT
Start: 2025-04-23 | End: 2025-04-30

## 2025-04-23 NOTE — PROGRESS NOTES
Urgent Care Clinic Visit    Chief Complaint   Patient presents with    Abdominal Pain     RLQ pain X 3 days                4/23/2025     2:00 PM   Additional Questions   Roomed by Ky   Accompanied by Self

## 2025-04-23 NOTE — PROGRESS NOTES
Patient presents with:  Abdominal Pain: RLQ pain X 3 days       (N39.0) Acute UTI  (primary encounter diagnosis)  Comment:   Plan: cefdinir (OMNICEF) 300 MG capsule,         phenazopyridine (PYRIDIUM) 200 MG tablet            (R10.31) RLQ abdominal pain  Comment:   Plan: UA Macroscopic with reflex to Microscopic and         Culture - Clinic Collect, Wet prep - Clinic         Collect, Urine Microscopic Exam, Urine Culture            At the end of the encounter, I discussed results, diagnosis, medications. Discussed red flags for immediate return to clinic/ER, as well as indications for follow up if no improvement. Patient understood and agreed to plan. Patient was stable for discharge     If not improving or if condition worsens, follow up with your Primary Care Provider            SUBJECTIVE:   Mariaelena Euceda is a 64 year old female who presents today with pain with urnation and right lower abdominal pain onset 3 days ago.  Denies any nausea or vomiting or fevers.      Has had UTI's in the past.        Patient Active Problem List   Diagnosis    Lumbar pain    CARDIOVASCULAR SCREENING; LDL GOAL LESS THAN 160    Family history of diabetes mellitus    S/P laparoscopic cholecystectomy    Hematuria    Hyperlipidemia, unspecified hyperlipidemia type    H/O: hysterectomy    History of 2019 novel coronavirus disease (COVID-19)    Overactive bladder         Past Medical History:   Diagnosis Date    Urinary tract infection, site not specified          Current Outpatient Medications   Medication Sig Dispense Refill    Multiple Vitamins-Iron (DAILY-RAJI/IRON/BETA-CAROTENE) TABS TAKE 1 TABLET BY MOUTH DAILY. (Patient not taking: Reported on 10/19/2020) 30 tablet 7     Social History     Tobacco Use    Smoking status: Never Smoker    Smokeless tobacco: Never Used   Substance Use Topics    Alcohol use: Not on file     Family History   Problem Relation Age of Onset    Diabetes Mother     Diabetes Father          ROS:    10 point  ROS of systems including Constitutional, Eyes, Respiratory, Cardiovascular, Gastroenterology, Genitourinary, Integumentary, Muscularskeletal, Psychiatric ,neurological were all negative except for pertinent positives noted in my HPI       OBJECTIVE:  /80   Pulse 75   Temp 97.2  F (36.2  C) (Tympanic)   Resp 16   Wt 65.3 kg (144 lb)   LMP 12/02/2010   SpO2 98%   BMI 29.42 kg/m    Physical Exam:  GENERAL APPEARANCE: healthy, alert and no distress  ABDOMEN:  soft, nontender, no HSM or masses and bowel sounds normal  SKIN: no suspicious lesions or rashes  BACK: No CVAT

## 2025-04-23 NOTE — PATIENT INSTRUCTIONS
(N39.0) Acute UTI  (primary encounter diagnosis)  Comment:   Plan: cefdinir (OMNICEF) 300 MG capsule,         phenazopyridine (PYRIDIUM) 200 MG tablet            (R10.31) RLQ abdominal pain  Comment:   Plan: UA Macroscopic with reflex to Microscopic and         Culture - Clinic Collect, Wet prep - Clinic         Collect, Urine Microscopic Exam, Urine Culture            Urine culture is pending, we will contact you if it reveals the need to change your antibiotic.      See handout on Urinary tract infections.

## 2025-04-24 LAB — BACTERIA UR CULT: NORMAL

## 2025-05-02 ENCOUNTER — OFFICE VISIT (OUTPATIENT)
Dept: FAMILY MEDICINE | Facility: CLINIC | Age: 65
End: 2025-05-02
Payer: COMMERCIAL

## 2025-05-02 ENCOUNTER — HOSPITAL ENCOUNTER (OUTPATIENT)
Dept: MAMMOGRAPHY | Facility: CLINIC | Age: 65
Discharge: HOME | End: 2025-05-02
Attending: INTERNAL MEDICINE | Admitting: INTERNAL MEDICINE
Payer: COMMERCIAL

## 2025-05-02 VITALS
HEIGHT: 59 IN | DIASTOLIC BLOOD PRESSURE: 83 MMHG | TEMPERATURE: 97.8 F | BODY MASS INDEX: 29.39 KG/M2 | WEIGHT: 145.8 LBS | SYSTOLIC BLOOD PRESSURE: 127 MMHG | RESPIRATION RATE: 16 BRPM | HEART RATE: 66 BPM | OXYGEN SATURATION: 99 %

## 2025-05-02 DIAGNOSIS — Z79.899 MEDICATION MANAGEMENT: ICD-10-CM

## 2025-05-02 DIAGNOSIS — N39.0 UTI (URINARY TRACT INFECTION), UNCOMPLICATED: Primary | ICD-10-CM

## 2025-05-02 DIAGNOSIS — M51.369 DEGENERATION OF INTERVERTEBRAL DISC OF LUMBAR REGION, UNSPECIFIED WHETHER PAIN PRESENT: ICD-10-CM

## 2025-05-02 DIAGNOSIS — K76.0 FATTY LIVER: ICD-10-CM

## 2025-05-02 DIAGNOSIS — Z13.29 SCREENING FOR THYROID DISORDER: ICD-10-CM

## 2025-05-02 DIAGNOSIS — Z12.31 VISIT FOR SCREENING MAMMOGRAM: ICD-10-CM

## 2025-05-02 DIAGNOSIS — R10.31 RLQ ABDOMINAL PAIN: ICD-10-CM

## 2025-05-02 DIAGNOSIS — E78.5 HYPERLIPIDEMIA, UNSPECIFIED HYPERLIPIDEMIA TYPE: ICD-10-CM

## 2025-05-02 LAB
ALBUMIN SERPL BCG-MCNC: 4.2 G/DL (ref 3.5–5.2)
ALBUMIN UR-MCNC: NEGATIVE MG/DL
ALP SERPL-CCNC: 72 U/L (ref 40–150)
ALT SERPL W P-5'-P-CCNC: 19 U/L (ref 0–50)
ANION GAP SERPL CALCULATED.3IONS-SCNC: 9 MMOL/L (ref 7–15)
APPEARANCE UR: CLEAR
AST SERPL W P-5'-P-CCNC: 21 U/L (ref 0–45)
BACTERIA #/AREA URNS HPF: ABNORMAL /HPF
BILIRUB SERPL-MCNC: 0.3 MG/DL
BILIRUB UR QL STRIP: NEGATIVE
BUN SERPL-MCNC: 14.2 MG/DL (ref 8–23)
CALCIUM SERPL-MCNC: 9 MG/DL (ref 8.8–10.4)
CHLORIDE SERPL-SCNC: 105 MMOL/L (ref 98–107)
CHOLEST SERPL-MCNC: 156 MG/DL
COLOR UR AUTO: YELLOW
CREAT SERPL-MCNC: 0.64 MG/DL (ref 0.51–0.95)
CRP SERPL-MCNC: <3 MG/L
EGFRCR SERPLBLD CKD-EPI 2021: >90 ML/MIN/1.73M2
ERYTHROCYTE [DISTWIDTH] IN BLOOD BY AUTOMATED COUNT: 12 % (ref 10–15)
FASTING STATUS PATIENT QL REPORTED: YES
FASTING STATUS PATIENT QL REPORTED: YES
GLUCOSE SERPL-MCNC: 103 MG/DL (ref 70–99)
GLUCOSE UR STRIP-MCNC: NEGATIVE MG/DL
HCO3 SERPL-SCNC: 25 MMOL/L (ref 22–29)
HCT VFR BLD AUTO: 39 % (ref 35–47)
HDLC SERPL-MCNC: 45 MG/DL
HGB BLD-MCNC: 12.9 G/DL (ref 11.7–15.7)
HGB UR QL STRIP: ABNORMAL
KETONES UR STRIP-MCNC: NEGATIVE MG/DL
LDLC SERPL CALC-MCNC: 78 MG/DL
LEUKOCYTE ESTERASE UR QL STRIP: ABNORMAL
MCH RBC QN AUTO: 29.9 PG (ref 26.5–33)
MCHC RBC AUTO-ENTMCNC: 33.1 G/DL (ref 31.5–36.5)
MCV RBC AUTO: 90 FL (ref 78–100)
NITRATE UR QL: NEGATIVE
NONHDLC SERPL-MCNC: 111 MG/DL
PH UR STRIP: 6 [PH] (ref 5–7)
PLATELET # BLD AUTO: 290 10E3/UL (ref 150–450)
POTASSIUM SERPL-SCNC: 3.9 MMOL/L (ref 3.4–5.3)
PROT SERPL-MCNC: 6.7 G/DL (ref 6.4–8.3)
RBC # BLD AUTO: 4.32 10E6/UL (ref 3.8–5.2)
RBC #/AREA URNS AUTO: ABNORMAL /HPF
SODIUM SERPL-SCNC: 139 MMOL/L (ref 135–145)
SP GR UR STRIP: 1.02 (ref 1–1.03)
SQUAMOUS #/AREA URNS AUTO: ABNORMAL /LPF
TRIGL SERPL-MCNC: 167 MG/DL
TSH SERPL DL<=0.005 MIU/L-ACNC: 1.51 UIU/ML (ref 0.3–4.2)
UROBILINOGEN UR STRIP-ACNC: 0.2 E.U./DL
WBC # BLD AUTO: 7.4 10E3/UL (ref 4–11)
WBC #/AREA URNS AUTO: ABNORMAL /HPF

## 2025-05-02 PROCEDURE — 80053 COMPREHEN METABOLIC PANEL: CPT | Performed by: INTERNAL MEDICINE

## 2025-05-02 PROCEDURE — 80061 LIPID PANEL: CPT | Performed by: INTERNAL MEDICINE

## 2025-05-02 PROCEDURE — 85027 COMPLETE CBC AUTOMATED: CPT | Performed by: INTERNAL MEDICINE

## 2025-05-02 PROCEDURE — 3074F SYST BP LT 130 MM HG: CPT | Performed by: INTERNAL MEDICINE

## 2025-05-02 PROCEDURE — 84443 ASSAY THYROID STIM HORMONE: CPT | Performed by: INTERNAL MEDICINE

## 2025-05-02 PROCEDURE — 36415 COLL VENOUS BLD VENIPUNCTURE: CPT | Performed by: INTERNAL MEDICINE

## 2025-05-02 PROCEDURE — 99214 OFFICE O/P EST MOD 30 MIN: CPT | Performed by: INTERNAL MEDICINE

## 2025-05-02 PROCEDURE — 3079F DIAST BP 80-89 MM HG: CPT | Performed by: INTERNAL MEDICINE

## 2025-05-02 PROCEDURE — 1126F AMNT PAIN NOTED NONE PRSNT: CPT | Performed by: INTERNAL MEDICINE

## 2025-05-02 PROCEDURE — 86140 C-REACTIVE PROTEIN: CPT | Performed by: INTERNAL MEDICINE

## 2025-05-02 PROCEDURE — 77063 BREAST TOMOSYNTHESIS BI: CPT

## 2025-05-02 PROCEDURE — 81001 URINALYSIS AUTO W/SCOPE: CPT | Performed by: INTERNAL MEDICINE

## 2025-05-02 SDOH — HEALTH STABILITY: PHYSICAL HEALTH: ON AVERAGE, HOW MANY MINUTES DO YOU ENGAGE IN EXERCISE AT THIS LEVEL?: 10 MIN

## 2025-05-02 SDOH — HEALTH STABILITY: PHYSICAL HEALTH: ON AVERAGE, HOW MANY DAYS PER WEEK DO YOU ENGAGE IN MODERATE TO STRENUOUS EXERCISE (LIKE A BRISK WALK)?: 1 DAY

## 2025-05-02 ASSESSMENT — PAIN SCALES - GENERAL: PAINLEVEL_OUTOF10: NO PAIN (0)

## 2025-05-02 ASSESSMENT — SOCIAL DETERMINANTS OF HEALTH (SDOH): HOW OFTEN DO YOU GET TOGETHER WITH FRIENDS OR RELATIVES?: ONCE A WEEK

## 2025-05-02 NOTE — PATIENT INSTRUCTIONS
Labs today    Follow up in one week  Seek sooner medical attention if there is any worsening of symptoms or problems.

## 2025-05-02 NOTE — PROGRESS NOTES
Assessment & Plan     Mariaelena was seen today for er f/u.    Diagnoses and all orders for this visit:    History obtained with help of interpretor on ipad    UTI (urinary tract infection), uncomplicated  -     UA with Microscopic reflex to Culture - lab collect; Future  -     UA with Microscopic reflex to Culture - lab collect  -     UA Microscopic with Reflex to Culture  Patient  was seen in UC   Was treated for UTI  Still has some pain in RLQ   No nausea vomiting or diarrhea  Pain is minimal    RLQ abdominal pain  -     CBC with platelets; Future  -     UA with Microscopic reflex to Culture - lab collect; Future  -     CBC with platelets; Future  -     CRP, inflammation; Future  -     UA with Microscopic reflex to Culture - lab collect  -     CBC with platelets  -     CRP, inflammation  -     UA Microscopic with Reflex to Culture  Will do the work up  RLQ tenderness is very minimal  No guarding or rigidity   BS positive     Hyperlipidemia, unspecified hyperlipidemia type  -     Lipid panel reflex to direct LDL Non-fasting; Future  -     Lipid panel reflex to direct LDL Non-fasting; Future  -     Lipid panel reflex to direct LDL Non-fasting    Fatty liver  Comments:  seen on CT urogram  Healthy diet and exercise is recommended     Screening for thyroid disorder  -     TSH with free T4 reflex; Future  -     TSH with free T4 reflex    Medication management  -     Comprehensive metabolic panel; Future  -     Comprehensive metabolic panel    Visit for screening mammogram  -     MA Screening Bilateral w/ Asad; Future    Degeneration of intervertebral disc of lumbar region, unspecified whether pain present    Discussed symptomatic treatment   Tylenol prn    Other orders  -     REVIEW OF HEALTH MAINTENANCE PROTOCOL ORDERS                MED REC REQUIRED  Post Medication Reconciliation Status: discharge medications reconciled and changed, per note/orders  BMI  Estimated body mass index is 29.79 kg/m  as calculated from  "the following:    Height as of this encounter: 1.49 m (4' 10.66\").    Weight as of this encounter: 66.1 kg (145 lb 12.8 oz).   Weight management plan: Discussed healthy diet and exercise guidelines    Counseling  Appropriate preventive services were addressed with this patient via screening, questionnaire, or discussion as appropriate for fall prevention, nutrition, physical activity, Tobacco-use cessation, social engagement, weight loss and cognition.  Checklist reviewing preventive services available has been given to the patient.  Reviewed patient's diet, addressing concerns and/or questions.   She is at risk for lack of exercise and has been provided with information to increase physical activity for the benefit of her well-being.           Justin Ferrell is a 64 year old, presenting for the following health issues:  ER F/U (UC:4/23/25)    HPI        ED/UC Followup:    Facility:  [facility]  Date of visit: 4/23/25  Reason for visit: Abdominal Pain: RLQ pain X 3 days   Current Status: improved but not resolved         Review of Systems  Constitutional, neuro, ENT, endocrine, pulmonary, cardiac, gastrointestinal, genitourinary, musculoskeletal, integument and psychiatric systems are negative, except as otherwise noted.      Objective    /83 (BP Location: Right arm, Patient Position: Sitting, Cuff Size: Adult Regular)   Pulse 66   Temp 97.8  F (36.6  C) (Oral)   Resp 16   Ht 1.49 m (4' 10.66\")   Wt 66.1 kg (145 lb 12.8 oz)   LMP 12/02/2010   SpO2 99%   BMI 29.79 kg/m    Body mass index is 29.79 kg/m .  Physical Exam   She is very nice and pleasant.  She is comfortable and not in any kind of distress.  She is fully alert awake oriented.  Very minimal tenderness in RLQ  No guarding or rigidity  Bowel sounds positive           Signed Electronically by: Janell Zaidi MD    "

## 2025-05-02 NOTE — LETTER
May 5, 2025      Mariaelena Ok  [address] [facility] [id number]        Dear ,    We are writing to inform you of your test results.    Bo Ferrell,     This is to inform you regarding your test result.     Urine test is negative for infection.Your total cholesterol is normal.   HDL which is called good cholesterol is low   Your LDL cholesterol is normal.  This is often call bad cholesterol and high levels increase the risk for heart attacks and strokes.   The triglycerides are high. Lowering  the amount of sugar ,alcohol and sweets in the diet helps to control this.Exercise and weight loss helps.   The testing of your kidney function, liver function and electrolytes was satisfactory   TSH which is thyroid hormone is normal.   C-reactive protein which is test to check for inflammation is normal   CBC result which includes white count Hemoglobin and  Platelet Counts is normal.               Sincerely,       Dr.Nasima Dejuan MD,FACP     Resulted Orders   UA with Microscopic reflex to Culture - lab collect   Result Value Ref Range    Color Urine Yellow Colorless, Straw, Light Yellow, Yellow    Appearance Urine Clear Clear    Glucose Urine Negative Negative mg/dL    Bilirubin Urine Negative Negative    Ketones Urine Negative Negative mg/dL    Specific Gravity Urine 1.025 1.003 - 1.035    Blood Urine Trace (A) Negative    pH Urine 6.0 5.0 - 7.0    Protein Albumin Urine Negative Negative mg/dL    Urobilinogen Urine 0.2 0.2, 1.0 E.U./dL    Nitrite Urine Negative Negative    Leukocyte Esterase Urine Small (A) Negative   Lipid panel reflex to direct LDL Non-fasting   Result Value Ref Range    Cholesterol 156 <200 mg/dL    Triglycerides 167 (H) <150 mg/dL    Direct Measure HDL 45 (L) >=50 mg/dL    LDL Cholesterol Calculated 78 <100 mg/dL    Non HDL Cholesterol 111 <130 mg/dL    Patient Fasting > 8hrs? Yes     Narrative    Cholesterol  Desirable: < 200 mg/dL  Borderline High: 200 - 239  mg/dL  High: >= 240 mg/dL    Triglycerides  Normal: < 150 mg/dL  Borderline High: 150 - 199 mg/dL  High: 200-499 mg/dL  Very High: >= 500 mg/dL    Direct Measure HDL  Female: >= 50 mg/dL   Male: >= 40 mg/dL    LDL Cholesterol  Desirable: < 100 mg/dL  Above Desirable: 100 - 129 mg/dL   Borderline High: 130 - 159 mg/dL   High:  160 - 189 mg/dL   Very High: >= 190 mg/dL    Non HDL Cholesterol  Desirable: < 130 mg/dL  Above Desirable: 130 - 159 mg/dL  Borderline High: 160 - 189 mg/dL  High: 190 - 219 mg/dL  Very High: >= 220 mg/dL   TSH with free T4 reflex   Result Value Ref Range    TSH 1.51 0.30 - 4.20 uIU/mL   Comprehensive metabolic panel   Result Value Ref Range    Sodium 139 135 - 145 mmol/L    Potassium 3.9 3.4 - 5.3 mmol/L    Carbon Dioxide (CO2) 25 22 - 29 mmol/L    Anion Gap 9 7 - 15 mmol/L    Urea Nitrogen 14.2 8.0 - 23.0 mg/dL    Creatinine 0.64 0.51 - 0.95 mg/dL    GFR Estimate >90 >60 mL/min/1.73m2      Comment:      eGFR calculated using 2021 CKD-EPI equation.    Calcium 9.0 8.8 - 10.4 mg/dL    Chloride 105 98 - 107 mmol/L    Glucose 103 (H) 70 - 99 mg/dL    Alkaline Phosphatase 72 40 - 150 U/L    AST 21 0 - 45 U/L    ALT 19 0 - 50 U/L    Protein Total 6.7 6.4 - 8.3 g/dL    Albumin 4.2 3.5 - 5.2 g/dL    Bilirubin Total 0.3 <=1.2 mg/dL    Patient Fasting > 8hrs? Yes    CBC with platelets   Result Value Ref Range    WBC Count 7.4 4.0 - 11.0 10e3/uL    RBC Count 4.32 3.80 - 5.20 10e6/uL    Hemoglobin 12.9 11.7 - 15.7 g/dL    Hematocrit 39.0 35.0 - 47.0 %    MCV 90 78 - 100 fL    MCH 29.9 26.5 - 33.0 pg    MCHC 33.1 31.5 - 36.5 g/dL    RDW 12.0 10.0 - 15.0 %    Platelet Count 290 150 - 450 10e3/uL   CRP, inflammation   Result Value Ref Range    CRP Inflammation <3.00 <5.00 mg/L   UA Microscopic with Reflex to Culture   Result Value Ref Range    Bacteria Urine Few (A) None Seen /HPF    RBC Urine 0-2 0-2 /HPF /HPF    WBC Urine 0-5 0-5 /HPF /HPF    Squamous Epithelials Urine Moderate (A) None Seen /LPF     Narrative    Urine Culture not indicated

## 2025-05-03 NOTE — RESULT ENCOUNTER NOTE
Please notify patient by sending following letter with copy of test results      Bo Ferrell,    This is to inform you regarding your test result.    Urine test is negative for infection.Your total cholesterol is normal.  HDL which is called good cholesterol is low  Your LDL cholesterol is normal.  This is often call bad cholesterol and high levels increase the risk for heart attacks and strokes.  The triglycerides are high. Lowering  the amount of sugar ,alcohol and sweets in the diet helps to control this.Exercise and weight loss helps.  The testing of your kidney function, liver function and electrolytes was satisfactory   TSH which is thyroid hormone is normal.  C-reactive protein which is test to check for inflammation is normal  CBC result which includes white count Hemoglobin and  Platelet Counts is normal.               Sincerely,      Dr.Nasima Dejuan MD,FACP

## 2025-05-06 ENCOUNTER — PATIENT OUTREACH (OUTPATIENT)
Dept: GASTROENTEROLOGY | Facility: CLINIC | Age: 65
End: 2025-05-06
Payer: COMMERCIAL

## 2025-05-06 DIAGNOSIS — Z12.11 SPECIAL SCREENING FOR MALIGNANT NEOPLASMS, COLON: Primary | ICD-10-CM

## 2025-05-06 PROBLEM — D12.6 ADENOMATOUS COLON POLYP: Status: ACTIVE | Noted: 2025-05-06

## 2025-05-06 NOTE — PROGRESS NOTES
"[facility] Screening Colonoscopy Referral Review    Patient meets the inclusion criteria for screening colonoscopy standing order.    Ordering/Referring Provider:  Janell Zaidi      BMI: Estimated body mass index is 29.79 kg/m  as calculated from the following:    Height as of 5/2/25: 1.49 m (4' 10.66\").    Weight as of 5/2/25: 66.1 kg (145 lb 12.8 oz).     Sedation:  Does patient have any of the following conditions affecting sedation?  No medical conditions affecting sedation.    Previous Scopes:  Any previous recommendations or follow up needs based on previous scope?  na / No recommendations.    Medical Concerns to Postpone Order:  Does patient have any of the following medical concerns that should postpone/delay colonoscopy referral?  No medical conditions affecting colonoscopy referral.    Final Referral Details:  Based on patient's medical history patient is appropriate for referral order with moderate sedation. If patient's BMI > 50 do not schedule in [facility].  "

## (undated) DEVICE — DRSG BANDAID 2X4" FABRIC LATEX FREE

## (undated) DEVICE — SUCTION IRR STRYKERFLOW II W/TIP 250-070-520

## (undated) DEVICE — SYR 30ML LL W/O NDL

## (undated) DEVICE — ESU HOLDER LAP INST DISP PURPLE LONG 330MM H-PRO-330

## (undated) DEVICE — ESU GROUND PAD UNIVERSAL W/O CORD

## (undated) DEVICE — SOL NACL 0.9% INJ 1000ML BAG 2B1324X

## (undated) DEVICE — TUBING IV EXTENSION SET 34"

## (undated) DEVICE — NDL ANGIOCATH 14GA 2" 4088

## (undated) DEVICE — GLOVE PROTEXIS W/NEU-THERA 7.5  2D73TE75

## (undated) DEVICE — SUCTION CANISTER MEDIVAC LINER 3000ML W/LID 65651-530

## (undated) DEVICE — DRAPE C-ARM 60X42" 1013

## (undated) DEVICE — SYR 50ML LL W/O NDL 309653

## (undated) DEVICE — CONNECTOR STOPCOCK 3 WAY MALE LL MX5311L

## (undated) DEVICE — SU MONOCRYL 4-0 PS-2 18" UND Y496G

## (undated) DEVICE — SOL NACL 0.9% IRRIG 1000ML BOTTLE 07138-09

## (undated) DEVICE — SURGICEL HEMOSTAT 3X4" NUKNIT 1943

## (undated) DEVICE — CATH CHOLANGIOGRAM 4.5FR TAUT METAL TIP 20018-M55

## (undated) DEVICE — CLIP APPLIER ENDO 5MM M/L LIGAMAX EL5ML

## (undated) DEVICE — ENDO TROCAR OPTICAL 05MM VERSAPORT PLUS W/FIX CAN ONB5STF

## (undated) DEVICE — PACK LAP CHOLE SLC15LCFSD

## (undated) DEVICE — SU VICRYL 0 UR-6 27" J603H

## (undated) DEVICE — SURGICEL ABSORBABLE HEMOSTAT SNOW 2"X4" 2082

## (undated) DEVICE — ESU CORD MONOPOLAR 10'  E0510

## (undated) DEVICE — DRSG STERI STRIP 1/2X4" R1547

## (undated) DEVICE — GLOVE GAMMEX DERMAPRENE ULTRA SZ 8 LF 8516

## (undated) DEVICE — PREP CHLORAPREP 26ML TINTED ORANGE  260815

## (undated) DEVICE — ENDO TROCAR OPTICAL 12MM VERSAPORT PLUS W/FIX CAN ONB12STF

## (undated) DEVICE — LINEN TOWEL PACK X5 5464